# Patient Record
Sex: FEMALE | Race: OTHER
[De-identification: names, ages, dates, MRNs, and addresses within clinical notes are randomized per-mention and may not be internally consistent; named-entity substitution may affect disease eponyms.]

---

## 2018-07-18 ENCOUNTER — HOSPITAL ENCOUNTER (OUTPATIENT)
Dept: HOSPITAL 62 - SP | Age: 58
End: 2018-07-18
Attending: PHYSICIAN ASSISTANT
Payer: COMMERCIAL

## 2018-07-18 DIAGNOSIS — R60.0: Primary | ICD-10-CM

## 2018-07-18 PROCEDURE — 93970 EXTREMITY STUDY: CPT

## 2018-07-18 NOTE — RADIOLOGY REPORT (SQ)
EXAM DESCRIPTION:  VENOUS BILATERAL LOWER



COMPLETED DATE/TIME:  7/18/2018 3:01 pm



REASON FOR STUDY:  BLE LOCALIZED EDEMA R60.0  LOCALIZED EDEMA



COMPARISON:  None.



TECHNIQUE:  Dynamic and static gray scale and color images acquired of both lower extremity venous sy
stems. Selected spectral images acquired with additional compression and augmentation maneuvers. Imag
es stored on PACS.



LIMITATIONS:  None.



FINDINGS:  RIGHT LEG

COMMON FEMORAL AND FEMORAL: Normal phasicity, compression and augmentation. No visualized echogenic m
aterial on gray scale. No defects on color images.

POPLITEAL: Normal compression and augmentation. No visualized echogenic material on gray scale. No de
fects on color images.

CALF VESSELS: Normal compression and augmentation. No visualized echogenic material on gray scale. No
 defects on color image.

GSV AND SSV: Normal compression. No visualized echogenic material on gray scale. No defects on color 
images.

ANY DEEP VENOUS INSUFFICIENCY: Not evaluated.

ANY EVIDENCE OF POPLITEAL CYST: No.

OTHER: No other significant finding.

LEFT LEG

COMMON FEMORAL AND FEMORAL: Normal phasicity, compression and augmentation. No visualized echogenic m
aterial on gray scale. No defects on color images.

POPLITEAL: Normal compression and augmentation. No visualized echogenic material on gray scale. No de
fects on color images.

CALF VESSELS: Normal compression and augmentation. No visualized echogenic material on gray scale. No
 defects on color images.

GSV AND SSV: Normal compression. No visualized echogenic material on gray scale. No defects on color 
images.

ANY DEEP VENOUS INSUFFICIENCY: Not evaluated.

ANY EVIDENCE POPLITEAL CYST: No.

OTHER: No other significant finding.



IMPRESSION:  NO EVIDENCE DVT OR SVT IN EITHER LEG.



TECHNICAL DOCUMENTATION:  JOB ID:  [id number]

 2011 [employer]- All Rights Reserved



Reading location - IP/workstation name: [facility]-RR2

## 2018-08-24 ENCOUNTER — HOSPITAL ENCOUNTER (EMERGENCY)
Dept: HOSPITAL 62 - ER | Age: 58
LOS: 1 days | Discharge: HOME | End: 2018-08-25
Payer: COMMERCIAL

## 2018-08-24 DIAGNOSIS — F17.210: ICD-10-CM

## 2018-08-24 DIAGNOSIS — E87.1: ICD-10-CM

## 2018-08-24 DIAGNOSIS — R10.13: ICD-10-CM

## 2018-08-24 DIAGNOSIS — F10.10: ICD-10-CM

## 2018-08-24 DIAGNOSIS — R11.2: ICD-10-CM

## 2018-08-24 DIAGNOSIS — K85.20: Primary | ICD-10-CM

## 2018-08-24 LAB
ADD MANUAL DIFF: NO
ALBUMIN SERPL-MCNC: 4.1 G/DL (ref 3.5–5)
ALP SERPL-CCNC: 191 U/L (ref 38–126)
ALT SERPL-CCNC: 64 U/L (ref 9–52)
ANION GAP SERPL CALC-SCNC: 16 MMOL/L (ref 5–19)
APPEARANCE UR: CLEAR
APTT PPP: YELLOW S
AST SERPL-CCNC: 166 U/L (ref 14–36)
BASOPHILS # BLD AUTO: 0 10^3/UL (ref 0–0.2)
BASOPHILS NFR BLD AUTO: 0.3 % (ref 0–2)
BILIRUB DIRECT SERPL-MCNC: 0.5 MG/DL (ref 0–0.4)
BILIRUB SERPL-MCNC: 0.9 MG/DL (ref 0.2–1.3)
BILIRUB UR QL STRIP: NEGATIVE
BUN SERPL-MCNC: 11 MG/DL (ref 7–20)
CALCIUM: 8.8 MG/DL (ref 8.4–10.2)
CHLORIDE SERPL-SCNC: 79 MMOL/L (ref 98–107)
CO2 SERPL-SCNC: 27 MMOL/L (ref 22–30)
EOSINOPHIL # BLD AUTO: 0 10^3/UL (ref 0–0.6)
EOSINOPHIL NFR BLD AUTO: 0.2 % (ref 0–6)
ERYTHROCYTE [DISTWIDTH] IN BLOOD BY AUTOMATED COUNT: 16.1 % (ref 11.5–14)
GLUCOSE SERPL-MCNC: 102 MG/DL (ref 75–110)
GLUCOSE UR STRIP-MCNC: NEGATIVE MG/DL
HCT VFR BLD CALC: 41.5 % (ref 36–47)
HGB BLD-MCNC: 14.7 G/DL (ref 12–15.5)
KETONES UR STRIP-MCNC: (no result) MG/DL
LIPASE SERPL-CCNC: 2945.5 U/L (ref 23–300)
LYMPHOCYTES # BLD AUTO: 0.8 10^3/UL (ref 0.5–4.7)
LYMPHOCYTES NFR BLD AUTO: 9 % (ref 13–45)
MCH RBC QN AUTO: 31.8 PG (ref 27–33.4)
MCHC RBC AUTO-ENTMCNC: 35.4 G/DL (ref 32–36)
MCV RBC AUTO: 90 FL (ref 80–97)
MONOCYTES # BLD AUTO: 0.3 10^3/UL (ref 0.1–1.4)
MONOCYTES NFR BLD AUTO: 3.8 % (ref 3–13)
NEUTROPHILS # BLD AUTO: 7.3 10^3/UL (ref 1.7–8.2)
NEUTS SEG NFR BLD AUTO: 86.7 % (ref 42–78)
NITRITE UR QL STRIP: NEGATIVE
PH UR STRIP: 5 [PH] (ref 5–9)
PLATELET # BLD: 215 10^3/UL (ref 150–450)
POTASSIUM SERPL-SCNC: 3.5 MMOL/L (ref 3.6–5)
PROT SERPL-MCNC: 7.5 G/DL (ref 6.3–8.2)
PROT UR STRIP-MCNC: 30 MG/DL
RBC # BLD AUTO: 4.62 10^6/UL (ref 3.72–5.28)
SODIUM SERPL-SCNC: 121.5 MMOL/L (ref 137–145)
SP GR UR STRIP: 1.02
TOTAL CELLS COUNTED % (AUTO): 100 %
UROBILINOGEN UR-MCNC: NEGATIVE MG/DL (ref ?–2)
WBC # BLD AUTO: 8.4 10^3/UL (ref 4–10.5)

## 2018-08-24 PROCEDURE — 36415 COLL VENOUS BLD VENIPUNCTURE: CPT

## 2018-08-24 PROCEDURE — 96361 HYDRATE IV INFUSION ADD-ON: CPT

## 2018-08-24 PROCEDURE — 96375 TX/PRO/DX INJ NEW DRUG ADDON: CPT

## 2018-08-24 PROCEDURE — 83690 ASSAY OF LIPASE: CPT

## 2018-08-24 PROCEDURE — 80053 COMPREHEN METABOLIC PANEL: CPT

## 2018-08-24 PROCEDURE — 76705 ECHO EXAM OF ABDOMEN: CPT

## 2018-08-24 PROCEDURE — 85025 COMPLETE CBC W/AUTO DIFF WBC: CPT

## 2018-08-24 PROCEDURE — 93005 ELECTROCARDIOGRAM TRACING: CPT

## 2018-08-24 PROCEDURE — 81001 URINALYSIS AUTO W/SCOPE: CPT

## 2018-08-24 PROCEDURE — 99285 EMERGENCY DEPT VISIT HI MDM: CPT

## 2018-08-24 PROCEDURE — 87493 C DIFF AMPLIFIED PROBE: CPT

## 2018-08-24 PROCEDURE — 93010 ELECTROCARDIOGRAM REPORT: CPT

## 2018-08-24 PROCEDURE — 96374 THER/PROPH/DIAG INJ IV PUSH: CPT

## 2018-08-24 RX ADMIN — MORPHINE SULFATE PRN MG: 10 INJECTION INTRAMUSCULAR; INTRAVENOUS; SUBCUTANEOUS at 22:23

## 2018-08-24 NOTE — EKG REPORT
SEVERITY:- BORDERLINE ECG -

SINUS RHYTHM

BORDERLINE PROLONGED QT INTERVAL

:

Confirmed by: Nlel Palmer 24-Aug-2018 17:34:01

## 2018-08-24 NOTE — ER DOCUMENT REPORT
ED General





- General


Chief Complaint: Abdominal Pain


Stated Complaint: ABDOMINAL PAIN, VOMITING


Time Seen by Provider: 08/24/18 20:35


Notes: 





Patient is a 58-year-old female with a past medical history of hypertension, 

chronic alcohol use, restless leg syndrome, who presents with 4-5 hours of 

epigastric abdominal pain with associated nausea and vomiting.  She states that 

the pain came on relatively abruptly and has been ongoing since that time.  She 

describes it as a stabbing, moderate to severe pain to her epigastric region 

radiating through into her back.  Nothing seems to improve or worsen her 

symptoms.  She does admit to regular heavy alcohol use including today.  She 

denies any history of pancreatitis in the past.  She denies any history of 

alcohol withdrawal but does admit to drinking beer every day.  She has not seen 

her general doctor regarding today's concerns.  She denies any fever or 

constitutional symptoms.


TRAVEL OUTSIDE OF THE [place] IN LAST 30 DAYS: No





- Related Data


Allergies/Adverse Reactions: 


 





No Known Allergies Allergy (Unverified 08/24/18 18:58)


 











Past Medical History





- General


Information source: Patient





- Social History


Smoking Status: Current Every Day Smoker


Cigarette use (# per day): Yes


Frequency of alcohol use: Heavy


Drug Abuse: None


Lives with: Alone


Family History: Reviewed & Not Pertinent


Patient has suicidal ideation: No


Patient has homicidal ideation: No


Renal/ Medical History: Denies: Hx Peritoneal Dialysis


Musculoskeletal Medical History: Reports Hx Arthritis


Psychiatric Medical History: Reports: Hx Depression


Past Surgical History: Reports: Hx Breast Surgery, Hx Oral Surgery, Hx 

Orthopedic Surgery





Review of Systems





- Review of Systems


Notes: 





Constitutional: Negative for fever.


HENT: Negative for sore throat.


Eyes: Negative for visual changes.


Cardiovascular: Negative for chest pain.


Respiratory: Negative for shortness of breath.


Gastrointestinal: Positive for abdominal pain and vomiting


Genitourinary: Negative for dysuria.


Musculoskeletal: Positive for mid back pain


Skin: Negative for rash.


Neurological: Negative for headaches, weakness or numbness.





10 point ROS negative except as marked above and in HPI.





Physical Exam





- Vital signs


Vitals: 


 











Temp Pulse Resp BP Pulse Ox


 


 97.5 F   64   20   128/70 H  96 


 


 08/24/18 19:11  08/24/18 19:11  08/24/18 19:11  08/24/18 19:11  08/24/18 19:11











Interpretation: Normal


Notes: 





PHYSICAL EXAMINATION:





GENERAL: Appears moderately uncomfortable but in no acute distress





HEAD: Atraumatic, normocephalic.





EYES: Pupils equal round and reactive to light, extraocular movements intact, 

sclera anicteric, conjunctiva are normal.





ENT: nares patent, oropharynx clear without exudates.  Moderately dry mucous 

membranes.





NECK: Normal range of motion, supple without lymphadenopathy





LUNGS: Breath sounds clear to auscultation bilaterally and equal.  No wheezes 

rales or rhonchi.





HEART: Regular rate and rhythm without murmurs





ABDOMEN: Soft, focal tenderness to the epigastrium but no other localized areas 

of tenderness, normoactive bowel sounds.  No guarding, no rebound.  No masses 

appreciated.





EXTREMITIES: Normal range of motion, no pitting or edema.  No cyanosis.





NEUROLOGICAL: No focal neurological deficits. Moves all extremities 

spontaneously and on command.





PSYCH: Normal mood, normal affect.





SKIN: Warm, Dry, normal turgor, no rashes or lesions noted.





Course





- Re-evaluation


Re-evalutation: 





08/24/18 21:36


Patient presents with clinical history and exam and labs to suggest acute 

pancreatitis.  Lipase is markedly elevated today.  Patient admits to alcohol 

use as the trigger for the acute episode of pancreatitis.  Patient does have 

hyponatremia although we have no old for comparison but given both hyponatremia 

and hypochloremia consistent with heavy alcohol intake which patient does admit 

to.  At time of arrival, patient's vitals are within normal limits, they are 

well-appearing and in no acute distress.  The patient has tolerated oral intake 

without difficulty.  Pain was able to be controlled here in the emergency 

department with oral medications.  [place] score is 1.  





I have encouraged hospitalization for the patient given her hyponatremia and 

vomiting but she has declined stating she would like to go home and manage this 

as an outpatient.  The patient states that she needs to go home, take care of 

her animals, and states that she will return if she is not improving.  She has 

capacity, verbalizes understanding that she could get sicker as an outpatient 

which could include multiple complications including seizures, death.  I have 

emphasized with the patient that I am concerned about the low probability of 

success in outpatient management particularly given her frequent alcohol use.  

I have emphasized that I am also concerned about what would happen if she 

stopped drinking alcohol.  The patient states that she has gone more than 24 

hours repeatedly within the past 6 months without having any withdrawal 

symptoms and that she has never had significant alcohol withdrawal.  She states 

that she is very confident that she can discontinue alcohol without any 

significant complications and does not wish to be hospitalized for management 

of any potential alcohol withdrawal symptoms.





At this time, per patient request, will discharge with return precautions and 

follow-up recommendations.  Verbal discharge instructions given a the bedside 

and opportunity for questions given. Medication warnings reviewed. Patient is 

in agreement with this plan and has verbalized understanding of return 

precautions and the need for primary care follow-up in the next 24 hours.








- Vital Signs


Vital signs: 


 











Temp Pulse Resp BP Pulse Ox


 


 97.6 F   64   18   184/85 H  93 


 


 08/25/18 02:09  08/25/18 02:09  08/25/18 02:09  08/25/18 02:09  08/25/18 02:09














- Laboratory


Result Diagrams: 


 08/24/18 21:43





 08/24/18 21:43


Laboratory results interpreted by me: 


 











  08/24/18 08/24/18 08/24/18





  21:43 21:43 21:43


 


RDW  16.1 H  


 


Seg Neutrophils %  86.7 H  


 


Lymphocytes %  9.0 L  


 


Sodium   121.5 L 


 


Potassium   3.5 L 


 


Chloride   79 L 


 


Direct Bilirubin   0.5 H 


 


AST   166 H 


 


ALT   64 H 


 


Alkaline Phosphatase   191 H 


 


Lipase   2945.5 H 


 


Urine Protein    30 H


 


Urine Ketones    TRACE H


 


Urine Blood    SMALL H














- Diagnostic Test


Radiology reviewed: Reports reviewed





Discharge





- Discharge


Clinical Impression: 


 Hyponatremia, Alcohol abuse





Acute alcoholic pancreatitis


Qualifiers:


 Acute pancreatitis complication: no infection or necrosis Qualified Code(s): 

K85.20 - Alcohol induced acute pancreatitis without necrosis or infection





Nausea and vomiting


Qualifiers:


 Vomiting type: unspecified Vomiting Intractability: non-intractable Qualified 

Code(s): R11.2 - Nausea with vomiting, unspecified





Condition: Stable


Disposition: HOME, SELF-CARE


Additional Instructions: 


You were seen today for alcohol-induced pancreatitis.  Please avoid alcohol in 

any quantity in the future as this could cause a recurrence of your 

pancreatitis.  Please keep in mind that pancreatitis can be a very serious 

condition that can even result in death.  Your case today appears mild and it 

is safe for you to go home today with medications for pain and nausea.  Please 

drink plenty of fluids such as Pedialyte, (try to avoid excessive plain water 

consumption because your salt level was low) over the next several days and try 

to avoid food ingestion until your pain is resolved.  Please return to the 

emergency department immediately if you develop persistent vomiting that 

prohibits you from taking your medications or keeping fluids down, you have a 

seizure, you develop a fever of greater than 101F, you have worsening pain, 

you become confused, you become short of breath, or have any other symptoms 

that are worrisome to you.  Please follow-up with your primary care doctor in 

the next 24-48 hours.


Prescriptions: 


Morphine Sulfate [Morphine Ir 15 mg Tablet] 15 mg PO Q6HP PRN #12 tablet


 PRN Reason: 


Ondansetron [Zofran Odt 4 mg Tablet] 1 - 2 tab PO Q4H PRN #15 tab.rapdis


 PRN Reason: For Nausea/Vomiting


Referrals: 


NICOLE ZULETA PA [Primary Care Provider] - Follow up tomorrow
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
## 2018-08-25 VITALS — DIASTOLIC BLOOD PRESSURE: 85 MMHG | SYSTOLIC BLOOD PRESSURE: 184 MMHG

## 2018-08-25 RX ADMIN — MORPHINE SULFATE PRN MG: 10 INJECTION INTRAMUSCULAR; INTRAVENOUS; SUBCUTANEOUS at 00:27

## 2018-08-25 NOTE — RADIOLOGY REPORT (SQ)
EXAM DESCRIPTION: 



US ABDOMEN LIMITED



COMPLETED DATE/TME:  08/24/2018 21:37



CLINICAL HISTORY: upper abdominal pain, vomiting



COMPARISON: None.



TECHNIQUE: Real-time sonographic images of the right upper

abdomen were obtained using a curved multihertz transducer.



FINDINGS:



Pancreas: Not visualized due to overlying structures.



Vascular: The visualized portions of the aorta and IVC are

unremarkable.



Liver: The liver has normal contour and increased echogenicity.

Hepatopedal flow in the portal vein.  Findings confirmed with

color and spectral Doppler imaging. The common bile duct is not

visualized.



Gallbladder: The gallbladder has a normal appearance. No

gallstones identified. No wall thickening or pericholecystic

fluid. Positive reported sonographic Parker sign.



Right Kidney: The right kidney measures 10.2 cm in length. No

hydronephrosis, solid renal mass, or shadowing calculi.







IMPRESSION:



1. No gallstones identified.



2. Hepatic steatosis.

## 2018-11-01 ENCOUNTER — HOSPITAL ENCOUNTER (EMERGENCY)
Dept: HOSPITAL 62 - ER | Age: 58
LOS: 1 days | Discharge: HOME | End: 2018-11-02
Payer: COMMERCIAL

## 2018-11-01 DIAGNOSIS — F10.10: Primary | ICD-10-CM

## 2018-11-01 DIAGNOSIS — Z88.2: ICD-10-CM

## 2018-11-01 DIAGNOSIS — Z86.14: ICD-10-CM

## 2018-11-01 DIAGNOSIS — T43.591A: ICD-10-CM
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
## 2018-11-01 DIAGNOSIS — F17.200: ICD-10-CM

## 2018-11-01 LAB
ABSOLUTE LYMPHOCYTES# (MANUAL): 0.6 10^3/UL (ref 0.5–4.7)
ABSOLUTE MONOCYTES # (MANUAL): 0.1 10^3/UL (ref 0.1–1.4)
ABSOLUTE NEUTROPHILS# (MANUAL): 1.7 10^3/UL (ref 1.7–8.2)
ADD MANUAL DIFF: YES
ALBUMIN SERPL-MCNC: 4 G/DL (ref 3.5–5)
ALP SERPL-CCNC: 243 U/L (ref 38–126)
ALT SERPL-CCNC: 132 U/L (ref 9–52)
ANION GAP SERPL CALC-SCNC: 18 MMOL/L (ref 5–19)
ANISOCYTOSIS BLD QL SMEAR: SLIGHT
APAP SERPL-MCNC: < 10 UG/ML (ref 10–30)
APPEARANCE UR: CLEAR
APTT PPP: (no result) S
AST SERPL-CCNC: 553 U/L (ref 14–36)
BARBITURATES UR QL SCN: NEGATIVE
BASOPHILS NFR BLD MANUAL: 0 % (ref 0–2)
BILIRUB DIRECT SERPL-MCNC: 0.4 MG/DL (ref 0–0.4)
BILIRUB SERPL-MCNC: 0.7 MG/DL (ref 0.2–1.3)
BILIRUB UR QL STRIP: NEGATIVE
BUN SERPL-MCNC: 3 MG/DL (ref 7–20)
CALCIUM: 8.7 MG/DL (ref 8.4–10.2)
CHLORIDE SERPL-SCNC: 93 MMOL/L (ref 98–107)
CO2 SERPL-SCNC: 24 MMOL/L (ref 22–30)
EOSINOPHIL NFR BLD MANUAL: 0 % (ref 0–6)
ERYTHROCYTE [DISTWIDTH] IN BLOOD BY AUTOMATED COUNT: 14.5 % (ref 11.5–14)
ETHANOL SERPL-MCNC: 213 MG/DL
GLUCOSE SERPL-MCNC: 100 MG/DL (ref 75–110)
GLUCOSE UR STRIP-MCNC: NEGATIVE MG/DL
HCT VFR BLD CALC: 37.6 % (ref 36–47)
HGB BLD-MCNC: 12.9 G/DL (ref 12–15.5)
KETONES UR STRIP-MCNC: NEGATIVE MG/DL
LIPASE SERPL-CCNC: 786.5 U/L (ref 23–300)
MCH RBC QN AUTO: 31.8 PG (ref 27–33.4)
MCHC RBC AUTO-ENTMCNC: 34.3 G/DL (ref 32–36)
MCV RBC AUTO: 93 FL (ref 80–97)
METHADONE UR QL SCN: NEGATIVE
MONOCYTES % (MANUAL): 4 % (ref 3–13)
NITRITE UR QL STRIP: NEGATIVE
PCP UR QL SCN: NEGATIVE
PH UR STRIP: 6 [PH] (ref 5–9)
PLATELET # BLD: 114 10^3/UL (ref 150–450)
PLATELET COMMENT: ADEQUATE
POIKILOCYTOSIS BLD QL SMEAR: SLIGHT
POTASSIUM SERPL-SCNC: 4 MMOL/L (ref 3.6–5)
PROT SERPL-MCNC: 7 G/DL (ref 6.3–8.2)
PROT UR STRIP-MCNC: NEGATIVE MG/DL
RBC # BLD AUTO: 4.05 10^6/UL (ref 3.72–5.28)
SALICYLATES SERPL-MCNC: < 1 MG/DL (ref 2–20)
SEGMENTED NEUTROPHILS % (MAN): 70 % (ref 42–78)
SODIUM SERPL-SCNC: 134.7 MMOL/L (ref 137–145)
SP GR UR STRIP: 1
TARGETS BLD QL SMEAR: SLIGHT
TOTAL CELLS COUNTED BLD: 100
URINE AMPHETAMINES SCREEN: NEGATIVE
URINE BENZODIAZEPINES SCREEN: (no result)
URINE COCAINE SCREEN: NEGATIVE
URINE MARIJUANA (THC) SCREEN: NEGATIVE
UROBILINOGEN UR-MCNC: NEGATIVE MG/DL (ref ?–2)
VARIANT LYMPHS NFR BLD MANUAL: 26 % (ref 13–45)
WBC # BLD AUTO: 2.4 10^3/UL (ref 4–10.5)

## 2018-11-01 PROCEDURE — 81001 URINALYSIS AUTO W/SCOPE: CPT

## 2018-11-01 PROCEDURE — 99285 EMERGENCY DEPT VISIT HI MDM: CPT

## 2018-11-01 PROCEDURE — 80053 COMPREHEN METABOLIC PANEL: CPT

## 2018-11-01 PROCEDURE — 80048 BASIC METABOLIC PNL TOTAL CA: CPT

## 2018-11-01 PROCEDURE — 83690 ASSAY OF LIPASE: CPT

## 2018-11-01 PROCEDURE — 93005 ELECTROCARDIOGRAM TRACING: CPT

## 2018-11-01 PROCEDURE — 96376 TX/PRO/DX INJ SAME DRUG ADON: CPT

## 2018-11-01 PROCEDURE — 80307 DRUG TEST PRSMV CHEM ANLYZR: CPT

## 2018-11-01 PROCEDURE — 36415 COLL VENOUS BLD VENIPUNCTURE: CPT

## 2018-11-01 PROCEDURE — 96375 TX/PRO/DX INJ NEW DRUG ADDON: CPT

## 2018-11-01 PROCEDURE — 96365 THER/PROPH/DIAG IV INF INIT: CPT

## 2018-11-01 PROCEDURE — 85025 COMPLETE CBC W/AUTO DIFF WBC: CPT

## 2018-11-01 PROCEDURE — 96361 HYDRATE IV INFUSION ADD-ON: CPT

## 2018-11-01 PROCEDURE — 84460 ALANINE AMINO (ALT) (SGPT): CPT

## 2018-11-01 PROCEDURE — 72070 X-RAY EXAM THORAC SPINE 2VWS: CPT

## 2018-11-01 PROCEDURE — 84450 TRANSFERASE (AST) (SGOT): CPT

## 2018-11-01 PROCEDURE — 93010 ELECTROCARDIOGRAM REPORT: CPT
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
## 2018-11-01 NOTE — RADIOLOGY REPORT (SQ)
EXAM DESCRIPTION: 



XR THORACIC SPINE 2 VIEWS



COMPLETED DATE/TME:  11/01/2018 23:14



CLINICAL HISTORY: 



58 years, Female, pain



COMPARISON:

None.



NUMBER OF VIEWS:

2



TECHNIQUE:

Frontal and lateral views of the thoracic spine



LIMITATIONS:

None.



FINDINGS:



Vertebral body height and alignment is preserved. The disc spaces

are maintained.



IMPRESSION:



Unremarkable exam

 



 2011 [facility] [employer]- All Rights Reserved

## 2018-11-02 VITALS — DIASTOLIC BLOOD PRESSURE: 85 MMHG | SYSTOLIC BLOOD PRESSURE: 177 MMHG

## 2018-11-02 LAB
ALT SERPL-CCNC: 116 U/L (ref 9–52)
ANION GAP SERPL CALC-SCNC: 14 MMOL/L (ref 5–19)
AST SERPL-CCNC: 542 U/L (ref 14–36)
BUN SERPL-MCNC: 3 MG/DL (ref 7–20)
CALCIUM: 8.7 MG/DL (ref 8.4–10.2)
CHLORIDE SERPL-SCNC: 94 MMOL/L (ref 98–107)
CO2 SERPL-SCNC: 28 MMOL/L (ref 22–30)
GLUCOSE SERPL-MCNC: 99 MG/DL (ref 75–110)
POTASSIUM SERPL-SCNC: 3.7 MMOL/L (ref 3.6–5)
SODIUM SERPL-SCNC: 136.3 MMOL/L (ref 137–145)

## 2018-11-02 NOTE — EKG REPORT
SEVERITY:- OTHERWISE NORMAL ECG -

SINUS TACHYCARDIA

:

Confirmed by: Kailee Ricardo MD 02-Nov-2018 12:39:04

## 2018-11-02 NOTE — EKG REPORT
SEVERITY:- BORDERLINE ECG -

SINUS RHYTHM

BORDERLINE PROLONGED QT INTERVAL

:

Confirmed by: Kailee Ricardo MD 02-Nov-2018 12:39:07
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
## 2018-11-03 ENCOUNTER — HOSPITAL ENCOUNTER (EMERGENCY)
Dept: HOSPITAL 62 - ER | Age: 58
Discharge: ANOTHER HOSPITAL | End: 2018-11-03
Payer: COMMERCIAL

## 2018-11-03 VITALS — SYSTOLIC BLOOD PRESSURE: 117 MMHG | DIASTOLIC BLOOD PRESSURE: 70 MMHG
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
## 2018-11-03 DIAGNOSIS — Z88.2: ICD-10-CM

## 2018-11-03 DIAGNOSIS — F17.200: ICD-10-CM
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
## 2018-11-03 DIAGNOSIS — R56.9: ICD-10-CM

## 2018-11-03 DIAGNOSIS — E87.1: ICD-10-CM

## 2018-11-03 DIAGNOSIS — F10.129: ICD-10-CM

## 2018-11-03 DIAGNOSIS — S01.01XA: Primary | ICD-10-CM

## 2018-11-03 LAB
ADD MANUAL DIFF: NO
ALBUMIN SERPL-MCNC: 4.2 G/DL (ref 3.5–5)
ALP SERPL-CCNC: 254 U/L (ref 38–126)
ALT SERPL-CCNC: 111 U/L (ref 9–52)
ANION GAP SERPL CALC-SCNC: 20 MMOL/L (ref 5–19)
AST SERPL-CCNC: 408 U/L (ref 14–36)
BASOPHILS # BLD AUTO: 0 10^3/UL (ref 0–0.2)
BASOPHILS NFR BLD AUTO: 0.2 % (ref 0–2)
BILIRUB DIRECT SERPL-MCNC: 0.9 MG/DL (ref 0–0.4)
BILIRUB SERPL-MCNC: 1.8 MG/DL (ref 0.2–1.3)
BUN SERPL-MCNC: 4 MG/DL (ref 7–20)
CALCIUM: 8.9 MG/DL (ref 8.4–10.2)
CHLORIDE SERPL-SCNC: 79 MMOL/L (ref 98–107)
CO2 SERPL-SCNC: 26 MMOL/L (ref 22–30)
EOSINOPHIL # BLD AUTO: 0 10^3/UL (ref 0–0.6)
EOSINOPHIL NFR BLD AUTO: 0.3 % (ref 0–6)
ERYTHROCYTE [DISTWIDTH] IN BLOOD BY AUTOMATED COUNT: 14.3 % (ref 11.5–14)
ETHANOL SERPL-MCNC: 159 MG/DL
GLUCOSE SERPL-MCNC: 105 MG/DL (ref 75–110)
HCT VFR BLD CALC: 42.7 % (ref 36–47)
HGB BLD-MCNC: 14.8 G/DL (ref 12–15.5)
LYMPHOCYTES # BLD AUTO: 0.6 10^3/UL (ref 0.5–4.7)
LYMPHOCYTES NFR BLD AUTO: 8 % (ref 13–45)
MCH RBC QN AUTO: 32.4 PG (ref 27–33.4)
MCHC RBC AUTO-ENTMCNC: 34.7 G/DL (ref 32–36)
MCV RBC AUTO: 93 FL (ref 80–97)
MONOCYTES # BLD AUTO: 0.4 10^3/UL (ref 0.1–1.4)
MONOCYTES NFR BLD AUTO: 4.7 % (ref 3–13)
NEUTROPHILS # BLD AUTO: 6.6 10^3/UL (ref 1.7–8.2)
NEUTS SEG NFR BLD AUTO: 86.8 % (ref 42–78)
PLATELET # BLD: 92 10^3/UL (ref 150–450)
POTASSIUM SERPL-SCNC: 3.8 MMOL/L (ref 3.6–5)
PROT SERPL-MCNC: 7.4 G/DL (ref 6.3–8.2)
RBC # BLD AUTO: 4.58 10^6/UL (ref 3.72–5.28)
SODIUM SERPL-SCNC: 125.7 MMOL/L (ref 137–145)
TOTAL CELLS COUNTED % (AUTO): 100 %
WBC # BLD AUTO: 7.6 10^3/UL (ref 4–10.5)

## 2018-11-03 PROCEDURE — 85025 COMPLETE CBC W/AUTO DIFF WBC: CPT

## 2018-11-03 PROCEDURE — 99291 CRITICAL CARE FIRST HOUR: CPT

## 2018-11-03 PROCEDURE — 36415 COLL VENOUS BLD VENIPUNCTURE: CPT

## 2018-11-03 PROCEDURE — 12002 RPR S/N/AX/GEN/TRNK2.6-7.5CM: CPT

## 2018-11-03 PROCEDURE — 80053 COMPREHEN METABOLIC PANEL: CPT

## 2018-11-03 PROCEDURE — 72125 CT NECK SPINE W/O DYE: CPT

## 2018-11-03 PROCEDURE — 96361 HYDRATE IV INFUSION ADD-ON: CPT

## 2018-11-03 PROCEDURE — 80307 DRUG TEST PRSMV CHEM ANLYZR: CPT

## 2018-11-03 PROCEDURE — 71045 X-RAY EXAM CHEST 1 VIEW: CPT

## 2018-11-03 PROCEDURE — 70450 CT HEAD/BRAIN W/O DYE: CPT

## 2018-11-03 PROCEDURE — 96375 TX/PRO/DX INJ NEW DRUG ADDON: CPT

## 2018-11-03 PROCEDURE — 96365 THER/PROPH/DIAG IV INF INIT: CPT

## 2018-11-03 RX ADMIN — SODIUM CHLORIDE PRN MLS/HR: 9 INJECTION, SOLUTION INTRAVENOUS at 18:00

## 2018-11-03 RX ADMIN — SODIUM CHLORIDE PRN MLS/HR: 9 INJECTION, SOLUTION INTRAVENOUS at 18:01

## 2018-11-03 NOTE — RADIOLOGY REPORT (SQ)
EXAM DESCRIPTION:  CT HEAD WITHOUT



COMPLETED DATE/TIME:  11/3/2018 4:18 pm



REASON FOR STUDY:  Fell and hit back of head, LOC, seizure



COMPARISON:  None.



TECHNIQUE:  Axial images acquired through the brain without intravenous contrast.  Images reviewed wi
th bone, brain and subdural windows.   Images stored on PACS.

All CT scanners at this facility use dose modulation, iterative reconstruction, and/or weight based d
osing when appropriate to reduce radiation dose to as low as reasonably achievable (ALARA).

[facility]: Dose Right  [facility]: CareDose    [facility]: Dose Right    [facility]: Teradose 4D    [facility]: Smart Technologies



RADIATION DOSE:  CT Rad equipment meets quality standard of care and radiation dose reduction techniq
ues were employed. CTDIvol: 53.2 mGy. DLP: 1070 mGy-cm. mGy.



LIMITATIONS:  None.



FINDINGS:  VENTRICLES: Normal size and contour.

CEREBRUM: No masses.  No hemorrhage.  No midline shift.  No evidence for acute infarction. Normal gra
y/white matter differentiation. No areas of low density in the white matter.

CEREBELLUM: No masses.  No hemorrhage.  No alteration of density.  No evidence for acute infarction.

EXTRAAXIAL SPACES: No fluid collections.  No masses.

ORBITS AND GLOBE: No intra- or extraconal masses.  Normal contour of globe without masses.

CALVARIUM: No fracture.

PARANASAL SINUSES: No fluid or mucosal thickening.

SOFT TISSUES: Posterior soft tissue swelling.

OTHER: No other significant finding.



IMPRESSION:  No intracranial hemorrhage or fracture.Posterior soft tissue swelling.

EVIDENCE OF ACUTE STROKE: NO.



COMMENT:  Quality ID # 436: Final reports with documentation of one or more dose reduction techniques
 (e.g., Automated exposure control, adjustment of the mA and/or kV according to patient size, use of 
iterative reconstruction technique)



TECHNICAL DOCUMENTATION:  JOB ID:  [id number]

[id number]

 2011 [employer]- All Rights Reserved



Reading location - IP/workstation name: [email or web address]

## 2018-11-03 NOTE — RADIOLOGY REPORT (SQ)
EXAM DESCRIPTION:  CT CERVICAL SPINE WITHOUT



COMPLETED DATE/TIME:  11/3/2018 4:18 pm



REASON FOR STUDY:  Fell and hit back of head, LOC, seizure



COMPARISON:  None.



TECHNIQUE:  Axial images acquired through the cervical spine without intravenous contrast.  Images re
viewed with lung, soft tissue and bone windows.  Reconstructed coronal and sagittal MPR images review
ed.  Images stored on PACS.

All CT scanners at this facility use dose modulation, iterative reconstruction, and/or weight based d
osing when appropriate to reduce radiation dose to as low as reasonably achievable (ALARA).

[facility]: Dose Right  [facility]: CareDose    [facility]: Dose Right    [facility]: Teradose 4D    [facility]: Smart Technologies



RADIATION DOSE:  CT Rad equipment meets quality standard of care and radiation dose reduction techniq
ues were employed. CTDIvol: 21.3 mGy. DLP: 529 mGy-cm. mGy.



LIMITATIONS:  None.



FINDINGS:  ALIGNMENT: Anatomic.

MINERALIZATION: Normal.

VERTEBRAL BODIES: No fractures or dislocation.

DISCS: No significant disc disease.

FACETS, LATERAL MASSES, POSTERIOR ELEMENTS: No fractures.  No dislocation.  No acute findings.

HARDWARE: None in the spine.

VISUALIZED RIBS: No fractures.

LUNG APICES AND SOFT TISSUES: Left thyroid inferior nodule with calcifications.

OTHER:  Fluid in the right middle ear, correlate for otitis.



IMPRESSION:  No evidence for acute osseous injury.  Fluid in the right middle ear, correlate for otit
is.Left thyroid inferior nodule with calcifications.



TECHNICAL DOCUMENTATION:  JOB ID:  [id number]

[id number]

Quality ID # 436: Final reports with documentation of one or more dose reduction techniques (e.g., Au
tomated exposure control, adjustment of the mA and/or kV according to patient size, use of iterative 
reconstruction technique)

 2011 [employer]- All Rights Reserved



Reading location - IP/workstation name: [email or web address]
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
## 2018-11-03 NOTE — RADIOLOGY REPORT (SQ)
EXAM DESCRIPTION:  CHEST SINGLE VIEW



COMPLETED DATE/TIME:  11/3/2018 6:47 pm



REASON FOR STUDY:  Seizure, rule out aspiration



COMPARISON:  None.



EXAM PARAMETERS:  NUMBER OF VIEWS: One view.

TECHNIQUE: Single frontal radiographic view of the chest acquired.

RADIATION DOSE: NA

LIMITATIONS: None.



FINDINGS:  LUNGS AND PLEURA: No opacities, masses or pneumothorax. No pleural effusion.

MEDIASTINUM AND HILAR STRUCTURES: No masses.  Contour normal.

HEART AND VASCULAR STRUCTURES: Heart normal in size.  Normal vasculature.

BONES: No acute findings.

HARDWARE: None in the chest.

OTHER: No other significant finding.



IMPRESSION:  NO ACUTE RADIOGRAPHIC FINDING IN THE CHEST.



TECHNICAL DOCUMENTATION:  JOB ID:  [id number]

[id number]

 2011 [employer]- All Rights Reserved



Reading location - IP/workstation name: [email or web address]

## 2019-03-08 ENCOUNTER — HOSPITAL ENCOUNTER (INPATIENT)
Dept: HOSPITAL 62 - ER | Age: 59
LOS: 31 days | Discharge: HOME | DRG: 896 | End: 2019-04-08
Attending: INTERNAL MEDICINE | Admitting: INTERNAL MEDICINE
Payer: COMMERCIAL

## 2019-03-08 DIAGNOSIS — E51.2: ICD-10-CM

## 2019-03-08 DIAGNOSIS — F17.210: ICD-10-CM

## 2019-03-08 DIAGNOSIS — A04.72: ICD-10-CM
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
## 2019-03-08 DIAGNOSIS — F10.239: ICD-10-CM

## 2019-03-08 DIAGNOSIS — E87.1: ICD-10-CM

## 2019-03-08 DIAGNOSIS — Z78.1: ICD-10-CM

## 2019-03-08 DIAGNOSIS — K85.90: ICD-10-CM
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
## 2019-03-08 DIAGNOSIS — N17.9: ICD-10-CM

## 2019-03-08 DIAGNOSIS — K70.10: ICD-10-CM

## 2019-03-08 DIAGNOSIS — S61.511A: ICD-10-CM

## 2019-03-08 DIAGNOSIS — F10.229: Primary | ICD-10-CM

## 2019-03-08 DIAGNOSIS — F32.9: ICD-10-CM

## 2019-03-08 DIAGNOSIS — F10.251: ICD-10-CM
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
## 2019-03-08 LAB
ADD MANUAL DIFF: NO
ALBUMIN SERPL-MCNC: 5 G/DL (ref 3.5–5)
ALP SERPL-CCNC: 280 U/L (ref 38–126)
ALT SERPL-CCNC: 144 U/L (ref 9–52)
ANION GAP SERPL CALC-SCNC: 21 MMOL/L (ref 5–19)
APAP SERPL-MCNC: < 10 UG/ML (ref 10–30)
APPEARANCE UR: (no result)
APTT PPP: YELLOW S
AST SERPL-CCNC: 506 U/L (ref 14–36)
BARBITURATES UR QL SCN: NEGATIVE
BASOPHILS # BLD AUTO: 0 10^3/UL (ref 0–0.2)
BASOPHILS NFR BLD AUTO: 0.8 % (ref 0–2)
BILIRUB DIRECT SERPL-MCNC: 0.8 MG/DL (ref 0–0.4)
BILIRUB SERPL-MCNC: 1.3 MG/DL (ref 0.2–1.3)
BILIRUB UR QL STRIP: NEGATIVE
BUN SERPL-MCNC: 7 MG/DL (ref 7–20)
CALCIUM: 9 MG/DL (ref 8.4–10.2)
CHLORIDE SERPL-SCNC: 78 MMOL/L (ref 98–107)
CO2 SERPL-SCNC: 26 MMOL/L (ref 22–30)
EOSINOPHIL # BLD AUTO: 0 10^3/UL (ref 0–0.6)
EOSINOPHIL NFR BLD AUTO: 0.2 % (ref 0–6)
ERYTHROCYTE [DISTWIDTH] IN BLOOD BY AUTOMATED COUNT: 15.8 % (ref 11.5–14)
ETHANOL SERPL-MCNC: 426 MG/DL
GLUCOSE SERPL-MCNC: 92 MG/DL (ref 75–110)
GLUCOSE UR STRIP-MCNC: NEGATIVE MG/DL
HCT VFR BLD CALC: 37.9 % (ref 36–47)
HGB BLD-MCNC: 13.2 G/DL (ref 12–15.5)
KETONES UR STRIP-MCNC: 20 MG/DL
LYMPHOCYTES # BLD AUTO: 0.9 10^3/UL (ref 0.5–4.7)
LYMPHOCYTES NFR BLD AUTO: 17.6 % (ref 13–45)
MCH RBC QN AUTO: 30.5 PG (ref 27–33.4)
MCHC RBC AUTO-ENTMCNC: 35 G/DL (ref 32–36)
MCV RBC AUTO: 87 FL (ref 80–97)
METHADONE UR QL SCN: NEGATIVE
MONOCYTES # BLD AUTO: 0.3 10^3/UL (ref 0.1–1.4)
MONOCYTES NFR BLD AUTO: 6.7 % (ref 3–13)
NEUTROPHILS # BLD AUTO: 3.8 10^3/UL (ref 1.7–8.2)
NEUTS SEG NFR BLD AUTO: 74.7 % (ref 42–78)
NITRITE UR QL STRIP: NEGATIVE
PCP UR QL SCN: NEGATIVE
PH UR STRIP: 6 [PH] (ref 5–9)
PLATELET # BLD: 130 10^3/UL (ref 150–450)
POTASSIUM SERPL-SCNC: 4 MMOL/L (ref 3.6–5)
PROT SERPL-MCNC: 8.1 G/DL (ref 6.3–8.2)
PROT UR STRIP-MCNC: 30 MG/DL
RBC # BLD AUTO: 4.33 10^6/UL (ref 3.72–5.28)
SALICYLATES SERPL-MCNC: < 1 MG/DL (ref 2–20)
SODIUM SERPL-SCNC: 124.5 MMOL/L (ref 137–145)
SP GR UR STRIP: 1.02
TOTAL CELLS COUNTED % (AUTO): 100 %
URINE AMPHETAMINES SCREEN: NEGATIVE
URINE BENZODIAZEPINES SCREEN: NEGATIVE
URINE COCAINE SCREEN: NEGATIVE
URINE MARIJUANA (THC) SCREEN: NEGATIVE
UROBILINOGEN UR-MCNC: NEGATIVE MG/DL (ref ?–2)
WBC # BLD AUTO: 5 10^3/UL (ref 4–10.5)

## 2019-03-08 PROCEDURE — 93005 ELECTROCARDIOGRAM TRACING: CPT

## 2019-03-08 PROCEDURE — 82272 OCCULT BLD FECES 1-3 TESTS: CPT

## 2019-03-08 PROCEDURE — 83690 ASSAY OF LIPASE: CPT

## 2019-03-08 PROCEDURE — 80074 ACUTE HEPATITIS PANEL: CPT

## 2019-03-08 PROCEDURE — 87493 C DIFF AMPLIFIED PROBE: CPT

## 2019-03-08 PROCEDURE — 85025 COMPLETE CBC W/AUTO DIFF WBC: CPT

## 2019-03-08 PROCEDURE — 85027 COMPLETE CBC AUTOMATED: CPT

## 2019-03-08 PROCEDURE — 80307 DRUG TEST PRSMV CHEM ANLYZR: CPT

## 2019-03-08 PROCEDURE — 84484 ASSAY OF TROPONIN QUANT: CPT

## 2019-03-08 PROCEDURE — 82140 ASSAY OF AMMONIA: CPT

## 2019-03-08 PROCEDURE — 80061 LIPID PANEL: CPT

## 2019-03-08 PROCEDURE — 82962 GLUCOSE BLOOD TEST: CPT

## 2019-03-08 PROCEDURE — 96374 THER/PROPH/DIAG INJ IV PUSH: CPT

## 2019-03-08 PROCEDURE — 72125 CT NECK SPINE W/O DYE: CPT

## 2019-03-08 PROCEDURE — 80076 HEPATIC FUNCTION PANEL: CPT

## 2019-03-08 PROCEDURE — 81001 URINALYSIS AUTO W/SCOPE: CPT

## 2019-03-08 PROCEDURE — 36415 COLL VENOUS BLD VENIPUNCTURE: CPT

## 2019-03-08 PROCEDURE — 96375 TX/PRO/DX INJ NEW DRUG ADDON: CPT

## 2019-03-08 PROCEDURE — 87045 FECES CULTURE AEROBIC BACT: CPT

## 2019-03-08 PROCEDURE — 83735 ASSAY OF MAGNESIUM: CPT

## 2019-03-08 PROCEDURE — 87205 SMEAR GRAM STAIN: CPT

## 2019-03-08 PROCEDURE — 74177 CT ABD & PELVIS W/CONTRAST: CPT

## 2019-03-08 PROCEDURE — 80048 BASIC METABOLIC PNL TOTAL CA: CPT

## 2019-03-08 PROCEDURE — 71260 CT THORAX DX C+: CPT

## 2019-03-08 PROCEDURE — 87040 BLOOD CULTURE FOR BACTERIA: CPT

## 2019-03-08 PROCEDURE — 80053 COMPREHEN METABOLIC PANEL: CPT

## 2019-03-08 PROCEDURE — 70450 CT HEAD/BRAIN W/O DYE: CPT

## 2019-03-08 PROCEDURE — 96361 HYDRATE IV INFUSION ADD-ON: CPT

## 2019-03-08 PROCEDURE — 99285 EMERGENCY DEPT VISIT HI MDM: CPT

## 2019-03-08 PROCEDURE — 93010 ELECTROCARDIOGRAM REPORT: CPT

## 2019-03-08 RX ADMIN — Medication SCH ML: at 21:54

## 2019-03-08 RX ADMIN — LORAZEPAM PRN MG: 2 INJECTION INTRAMUSCULAR; INTRAVENOUS at 17:35

## 2019-03-08 RX ADMIN — HYDROXYZINE PAMOATE SCH MG: 25 CAPSULE ORAL at 21:54

## 2019-03-08 RX ADMIN — HEPARIN SODIUM SCH UNIT: 5000 INJECTION, SOLUTION INTRAVENOUS; SUBCUTANEOUS at 21:51

## 2019-03-08 RX ADMIN — ACETAMINOPHEN PRN MG: 325 TABLET ORAL at 19:12

## 2019-03-08 RX ADMIN — NALBUPHINE HYDROCHLORIDE PRN MG: 10 INJECTION, SOLUTION INTRAMUSCULAR; INTRAVENOUS; SUBCUTANEOUS at 21:55

## 2019-03-08 RX ADMIN — QUETIAPINE FUMARATE SCH MG: 25 TABLET, FILM COATED ORAL at 21:54

## 2019-03-08 RX ADMIN — SODIUM CHLORIDE PRN MLS/HR: 9 INJECTION, SOLUTION INTRAVENOUS at 17:35

## 2019-03-08 RX ADMIN — HYDROXYZINE PAMOATE SCH MG: 25 CAPSULE ORAL at 19:12

## 2019-03-08 RX ADMIN — PROPRANOLOL HYDROCHLORIDE SCH MG: 20 TABLET ORAL at 21:54

## 2019-03-08 RX ADMIN — Medication SCH MG: at 21:51

## 2019-03-08 RX ADMIN — ROPINIROLE HYDROCHLORIDE SCH MG: 1 TABLET, FILM COATED ORAL at 21:54

## 2019-03-08 RX ADMIN — LORAZEPAM PRN MG: 2 INJECTION INTRAMUSCULAR; INTRAVENOUS at 20:54

## 2019-03-08 RX ADMIN — DULOXETINE SCH MG: 30 CAPSULE, DELAYED RELEASE ORAL at 19:12

## 2019-03-08 RX ADMIN — PROPRANOLOL HYDROCHLORIDE SCH MG: 20 TABLET ORAL at 17:35

## 2019-03-08 NOTE — RADIOLOGY REPORT (SQ)
EXAM DESCRIPTION:  CT ABD/PELVIS WITH IV ONLY



COMPLETED DATE/TIME:  3/8/2019 12:48 pm



REASON FOR STUDY:  fall trauma SI attempt



COMPARISON:  None.



TECHNIQUE:  CT scan of the abdomen and pelvis performed using helical scanning technique with dynamic
 intravenous contrast injection.  No oral contrast. Images reviewed with lung, soft tissue, and bone 
windows. Reconstructed coronal and sagittal MPR images reviewed. Delayed images for evaluation of the
 urinary system also acquired. All images stored on PACS.

All CT scanners at this facility use dose modulation, iterative reconstruction, and/or weight based d
osing when appropriate to reduce radiation dose to as low as reasonably achievable (ALARA).

[facility]: Dose Right  [facility]: CareDose    [facility]: Dose Right    [facility]: Teradose 4D    [facility]: Smart [employer]



CONTRAST TYPE AND DOSE:  contrast/concentration: Isovue 350.00 mg/ml; Total Contrast Delivered: 100.0
 ml; Total Saline Delivered: 56.7 ml



RENAL FUNCTION:  Deferred by the emergency room physician



RADIATION DOSE:  14 mGy.



LIMITATIONS:  None.



FINDINGS:  LOWER CHEST: No significant findings. No nodules or infiltrates.

LIVER: Normal size.  Diffuse low attenuation throughout the liver from profound fatty infiltration.

SPLEEN: Normal size. No focal lesions.

PANCREAS: Peripancreatic inflammation in the retroperitoneum on coronal image 31.  No pancreatic pseu
docyst is identified.

GALLBLADDER: Multiple stones in the gallbladder

ADRENAL GLANDS: No significant masses or asymmetry.

RIGHT KIDNEY AND URETER: No solid masses.   No significant calcifications.   No hydronephrosis or hyd
roureter.

LEFT KIDNEY AND URETER: No solid masses.   No significant calcifications.   No hydronephrosis or hydr
oureter.

AORTA AND VESSELS: No aneurysm. No dissection. Renal arteries, SMA, celiac without stenosis.

RETROPERITONEUM: No retroperitoneal adenopathy, hemorrhage or masses.

BOWEL AND PERITONEAL CAVITY: No free intraperitoneal air or free intra-abdominal or pelvic fluid.  No
 CT evidence of bowel obstruction.

APPENDIX: Normal.

PELVIS: No mass.  No free fluid. Normal bladder.  Normal size female pelvic organs

ABDOMINAL WALL: No masses. No hernias.

BONES: Biconcave vertebral body deformity at L2 with kyphoplasty.  No acute fractures over the lumbar
 spine and bony pelvis

OTHER: No other significant finding.



IMPRESSION:  Fatty liver

Gallstones

Peripancreatic retroperitoneal inflammation, question pancreatitis



TECHNICAL DOCUMENTATION:  JOB ID:  [id number]

Quality ID # 436: Final reports with documentation of one or more dose reduction techniques (e.g., Au
tomated exposure control, adjustment of the mA and/or kV according to patient size, use of iterative 
reconstruction technique)

 2011 [employer]- All Rights Reserved



Reading location - IP/workstation name: [email or web address]

## 2019-03-08 NOTE — RADIOLOGY REPORT (SQ)
EXAM DESCRIPTION:  CT HEAD WITHOUT



COMPLETED DATE/TIME:  3/8/2019 12:43 pm



REASON FOR STUDY:  fall trauma SI attempt



COMPARISON:  11/3/2018



TECHNIQUE:  Axial images acquired through the brain without intravenous contrast.  Images reviewed wi
th bone, brain and subdural windows.   Images stored on PACS.

All CT scanners at this facility use dose modulation, iterative reconstruction, and/or weight based d
osing when appropriate to reduce radiation dose to as low as reasonably achievable (ALARA).

[facility]: Dose Right  [facility]: CareDose    [facility]: Dose Right    [facility]: Teradose 4D    [facility]: [email or web address]



RADIATION DOSE:  CT Rad equipment meets quality standard of care and radiation dose reduction techniq
ues were employed. CTDIvol: 53.2 mGy. DLP: 1070 mGy-cm. mGy.



LIMITATIONS:  None.



FINDINGS:  VENTRICLES: Normal size and contour.

CEREBRUM: No masses.  No hemorrhage.  No midline shift.  No evidence for acute infarction. Normal gra
y/white matter differentiation. No areas of low density in the white matter.

CEREBELLUM: No masses.  No hemorrhage.  No alteration of density.  No evidence for acute infarction.

EXTRAAXIAL SPACES: No fluid collections.  No masses.

ORBITS AND GLOBE: No intra- or extraconal masses.  Normal contour of globe without masses.

CALVARIUM: No fracture.

PARANASAL SINUSES: No fluid or mucosal thickening.

SOFT TISSUES: No mass or hematoma.

OTHER: No other significant finding.



IMPRESSION:  No acute intracranial findings.

EVIDENCE OF ACUTE STROKE: NO.



COMMENT:  Quality ID # 436: Final reports with documentation of one or more dose reduction techniques
 (e.g., Automated exposure control, adjustment of the mA and/or kV according to patient size, use of 
iterative reconstruction technique)



TECHNICAL DOCUMENTATION:  JOB ID:  [id number]

[id number]

 2011 [employer]- All Rights Reserved



Reading location - IP/workstation name: [email or web address]

## 2019-03-08 NOTE — RADIOLOGY REPORT (SQ)
EXAM DESCRIPTION:  CT CHEST WITH



COMPLETED DATE/TIME:  3/8/2019 12:48 pm



REASON FOR STUDY:  fall trauma SI attempt



COMPARISON:  CT cervical spine same date



TECHNIQUE:  CT scan of the chest performed using helical scanning technique with dynamic intravenous 
contrast injection.  Images reviewed with lung, soft tissue and bone windows.  Reconstructed coronal 
and sagittal MPR and MIP images reviewed.  All images stored on PACS.

All CT scanners at this facility use dose modulation, iterative reconstruction, and/or weight based d
osing when appropriate to reduce radiation dose to as low as reasonably achievable (ALARA).

[facility]: Dose Right  [facility]: CareDose    [facility]: Dose Right    [facility]: Teradose 4D    [facility]: [employer]



CONTRAST TYPE AND DOSE:  100 mL IV Omnipaque 350- low osmolar.



RENAL FUNCTION:  Deferred by the emergency room physician



RADIATION DOSE:  CT Rad equipment meets quality standard of care and radiation dose reduction techniq
ues were employed. CTDIvol: 9.6 - 14.4 mGy. DLP: 1528 mGy-cm. .



LIMITATIONS:  None.



FINDINGS:  LUNGS AND PLEURA: No opacities, nodules, masses.  No pneumothorax. No effusions.

HILAR AND MEDIASTINAL STRUCTURES: No identified masses or abnormal nodes.

HEART AND VASCULAR STRUCTURES: No aneurysm or dissection.  No central pulmonary emboli.  No pericardi
al effusion.

HARDWARE: None in the chest.

UPPER ABDOMEN: Fatty liver, gallstones, peripancreatic retroperitoneal fluid worrisome for acute panc
reatitis

THYROID AND OTHER SOFT TISSUES: Partially calcified 1 cm left lower pole thyroid nodule

BONES: No significant finding.

OTHER: No other significant finding.



IMPRESSION:  No acute changes



TECHNICAL DOCUMENTATION:  JOB ID:  [id number]

Quality ID # 436: Final reports with documentation of one or more dose reduction techniques (e.g., Au
tomated exposure control, adjustment of the mA and/or kV according to patient size, use of iterative 
reconstruction technique)

 2011 [employer]- All Rights Reserved



Reading location - IP/workstation name: [email or web address]

## 2019-03-08 NOTE — RADIOLOGY REPORT (SQ)
EXAM DESCRIPTION:  CT CERVICAL SPINE WITHOUT



COMPLETED DATE/TIME:  3/8/2019 12:43 pm



REASON FOR STUDY:  fall trauma SI attempt



COMPARISON:  None.



TECHNIQUE:  Axial images acquired through the cervical spine without intravenous contrast.  Images re
viewed with lung, soft tissue and bone windows.  Reconstructed coronal and sagittal MPR images review
ed.  Images stored on PACS.

All CT scanners at this facility use dose modulation, iterative reconstruction, and/or weight based d
osing when appropriate to reduce radiation dose to as low as reasonably achievable (ALARA).

[facility]: Dose Right  [facility]: CareDose    [facility]: Dose Right    [facility]: Teradose 4D    [facility]: Smart Technologies



RADIATION DOSE:  CT Rad equipment meets quality standard of care and radiation dose reduction techniq
ues were employed. CTDIvol: 22.3 mGy. DLP: 451 mGy-cm. mGy.



LIMITATIONS:  None.



FINDINGS:  ALIGNMENT: Anatomic.

MINERALIZATION: Normal.

VERTEBRAL BODIES: No fractures or dislocation.

DISCS: Multilevel disc space narrowing with osteophytes.

FACETS, LATERAL MASSES, POSTERIOR ELEMENTS: Facet arthropathy.  No fractures.  No dislocation.  No ac
[place] findings.

HARDWARE: None in the spine.

VISUALIZED RIBS: No fractures.

LUNG APICES AND SOFT TISSUES:  No acute findings.  Similar left thyroid nodule with calcifications.

OTHER: No other significant finding.



IMPRESSION:  CHRONIC DEGENERATIVE CHANGES.  NO ACUTE FINDINGS. Similar left thyroid nodule with calci
fications.



TECHNICAL DOCUMENTATION:  JOB ID:  [id number]

[id number]

Quality ID # [id number]: Final reports with documentation of one or more dose reduction techniques (e.g., Au
tomated exposure control, adjustment of the mA and/or kV according to patient size, use of iterative 
reconstruction technique)

 2011 [employer]- All Rights Reserved



Reading location - IP/workstation name: [employer]
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
## 2019-03-08 NOTE — EKG REPORT
SEVERITY:- OTHERWISE NORMAL ECG -

SINUS TACHYCARDIA

:

Confirmed by: Kailee Ricardo MD 08-Mar-2019 20:52:44
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
## 2019-03-09 LAB
ALBUMIN SERPL-MCNC: 4.5 G/DL (ref 3.5–5)
ALP SERPL-CCNC: 248 U/L (ref 38–126)
ALT SERPL-CCNC: 125 U/L (ref 9–52)
ANION GAP SERPL CALC-SCNC: 16 MMOL/L (ref 5–19)
AST SERPL-CCNC: 345 U/L (ref 14–36)
BILIRUB DIRECT SERPL-MCNC: 0.7 MG/DL (ref 0–0.4)
BILIRUB SERPL-MCNC: 1.8 MG/DL (ref 0.2–1.3)
BUN SERPL-MCNC: 5 MG/DL (ref 7–20)
CALCIUM: 9.1 MG/DL (ref 8.4–10.2)
CHLORIDE SERPL-SCNC: 83 MMOL/L (ref 98–107)
CHOLEST SERPL-MCNC: 256.45 MG/DL (ref 0–200)
CO2 SERPL-SCNC: 29 MMOL/L (ref 22–30)
ERYTHROCYTE [DISTWIDTH] IN BLOOD BY AUTOMATED COUNT: 15.8 % (ref 11.5–14)
GLUCOSE SERPL-MCNC: 99 MG/DL (ref 75–110)
HCT VFR BLD CALC: 36 % (ref 36–47)
HGB BLD-MCNC: 12.5 G/DL (ref 12–15.5)
LDLC SERPL DIRECT ASSAY-MCNC: 54 MG/DL (ref ?–100)
LIPASE SERPL-CCNC: 9352.1 U/L (ref 23–300)
MCH RBC QN AUTO: 30.3 PG (ref 27–33.4)
MCHC RBC AUTO-ENTMCNC: 34.8 G/DL (ref 32–36)
MCV RBC AUTO: 87 FL (ref 80–97)
PLATELET # BLD: 101 10^3/UL (ref 150–450)
POTASSIUM SERPL-SCNC: 2.9 MMOL/L (ref 3.6–5)
PROT SERPL-MCNC: 7.5 G/DL (ref 6.3–8.2)
RBC # BLD AUTO: 4.14 10^6/UL (ref 3.72–5.28)
SODIUM SERPL-SCNC: 128.3 MMOL/L (ref 137–145)
TRIGL SERPL-MCNC: 56 MG/DL (ref ?–150)
VLDLC SERPL CALC-MCNC: 11 MG/DL (ref 10–31)
WBC # BLD AUTO: 6.9 10^3/UL (ref 4–10.5)

## 2019-03-09 RX ADMIN — PROPRANOLOL HYDROCHLORIDE SCH MG: 20 TABLET ORAL at 09:01

## 2019-03-09 RX ADMIN — SODIUM CHLORIDE PRN MLS/HR: 9 INJECTION, SOLUTION INTRAVENOUS at 02:33

## 2019-03-09 RX ADMIN — NALBUPHINE HYDROCHLORIDE PRN MG: 10 INJECTION, SOLUTION INTRAMUSCULAR; INTRAVENOUS; SUBCUTANEOUS at 05:21

## 2019-03-09 RX ADMIN — LORAZEPAM PRN MG: 2 INJECTION INTRAMUSCULAR; INTRAVENOUS at 23:26

## 2019-03-09 RX ADMIN — AMLODIPINE BESYLATE SCH MG: 10 TABLET ORAL at 13:45

## 2019-03-09 RX ADMIN — HEPARIN SODIUM SCH UNIT: 5000 INJECTION, SOLUTION INTRAVENOUS; SUBCUTANEOUS at 13:45

## 2019-03-09 RX ADMIN — HYDROXYZINE PAMOATE SCH MG: 25 CAPSULE ORAL at 09:01

## 2019-03-09 RX ADMIN — HYDROXYZINE PAMOATE SCH MG: 25 CAPSULE ORAL at 13:45

## 2019-03-09 RX ADMIN — DULOXETINE SCH MG: 30 CAPSULE, DELAYED RELEASE ORAL at 09:01

## 2019-03-09 RX ADMIN — HYDROXYZINE PAMOATE SCH MG: 25 CAPSULE ORAL at 17:43

## 2019-03-09 RX ADMIN — POTASSIUM CHLORIDE SCH MLS/HR: 29.8 INJECTION, SOLUTION INTRAVENOUS at 17:55

## 2019-03-09 RX ADMIN — POTASSIUM CHLORIDE SCH MLS/HR: 29.8 INJECTION, SOLUTION INTRAVENOUS at 19:39

## 2019-03-09 RX ADMIN — ACETAMINOPHEN PRN MG: 325 TABLET ORAL at 21:07

## 2019-03-09 RX ADMIN — Medication SCH MG: at 13:49

## 2019-03-09 RX ADMIN — DULOXETINE SCH MG: 30 CAPSULE, DELAYED RELEASE ORAL at 17:43

## 2019-03-09 RX ADMIN — SODIUM CHLORIDE PRN MLS/HR: 9 INJECTION, SOLUTION INTRAVENOUS at 13:48

## 2019-03-09 RX ADMIN — HEPARIN SODIUM SCH UNIT: 5000 INJECTION, SOLUTION INTRAVENOUS; SUBCUTANEOUS at 05:21

## 2019-03-09 RX ADMIN — QUETIAPINE FUMARATE SCH MG: 25 TABLET, FILM COATED ORAL at 21:07

## 2019-03-09 RX ADMIN — HYDROXYZINE PAMOATE SCH MG: 25 CAPSULE ORAL at 21:07

## 2019-03-09 RX ADMIN — Medication SCH MG: at 05:21

## 2019-03-09 RX ADMIN — LORAZEPAM PRN MG: 2 INJECTION INTRAMUSCULAR; INTRAVENOUS at 09:02

## 2019-03-09 RX ADMIN — BUSPIRONE HYDROCHLORIDE SCH MG: 10 TABLET ORAL at 17:42

## 2019-03-09 RX ADMIN — Medication SCH MG: at 21:06

## 2019-03-09 RX ADMIN — HEPARIN SODIUM SCH UNIT: 5000 INJECTION, SOLUTION INTRAVENOUS; SUBCUTANEOUS at 21:07

## 2019-03-09 RX ADMIN — MAGNESIUM SULFATE IN DEXTROSE SCH MLS/HR: 10 INJECTION, SOLUTION INTRAVENOUS at 15:02

## 2019-03-09 RX ADMIN — Medication SCH ML: at 13:46

## 2019-03-09 RX ADMIN — Medication SCH: at 21:08

## 2019-03-09 RX ADMIN — LORAZEPAM PRN MG: 2 INJECTION INTRAMUSCULAR; INTRAVENOUS at 02:28

## 2019-03-09 RX ADMIN — PROPRANOLOL HYDROCHLORIDE SCH MG: 20 TABLET ORAL at 21:07

## 2019-03-09 RX ADMIN — OLANZAPINE SCH MG: 5 TABLET, FILM COATED ORAL at 17:43

## 2019-03-09 RX ADMIN — LORAZEPAM PRN MG: 2 INJECTION INTRAMUSCULAR; INTRAVENOUS at 13:46

## 2019-03-09 RX ADMIN — ROPINIROLE HYDROCHLORIDE SCH MG: 1 TABLET, FILM COATED ORAL at 21:07

## 2019-03-09 RX ADMIN — LORAZEPAM PRN MG: 2 INJECTION INTRAMUSCULAR; INTRAVENOUS at 06:53

## 2019-03-09 RX ADMIN — MAGNESIUM SULFATE IN DEXTROSE SCH MLS/HR: 10 INJECTION, SOLUTION INTRAVENOUS at 13:45

## 2019-03-09 RX ADMIN — Medication SCH: at 05:22

## 2019-03-09 NOTE — PDOC PROGRESS REPORT
Subjective


Progress Note for:: 03/09/19


Subjective:: 





The patient was seen earlier today on rounds.  The patient was lying in bed.  

The patient was more awake and alert but still very slow to process.  The 

patient has been tolerating her clear liquids without issue and denies any pain 

when asked.  The patient denies any nausea, vomiting, diarrhea, shortness of 

breath, dizziness, chest pain, heart palpitations, fevers, or chills.  The 

patient has remained afebrile.  Blood pressures have been elevated.  When 

prompted the patient voices no other concerns at this time.  





Review of systems: The rest of the review of systems is negative.


Reason For Visit: 


PANCREATITIS, ETOH INTOXICATION








Physical Exam


Vital Signs: 


                                        











Temp Pulse Resp BP Pulse Ox


 


 97.4 F   74   16   180/78 H  94 


 


 03/09/19 08:28  03/09/19 10:10  03/09/19 08:28  03/09/19 10:10  03/09/19 08:28








                                 Intake & Output











 03/07/19 03/08/19 03/09/19





 23:59 23:59 23:59


 


Intake Total  1500 2574


 


Balance  1500 2574


 


Weight  69.7 kg 70.9 kg








On examination the patient is awake, alert, oriented, to place but without 

insight to situation.  The patient is verbal, conversational, ambulatory with 

assistance.  Does not appear to be in any acute distress.


Skin is warm and dry, no rash, not diaphoretic.  Multiple areas of bruising.


HEENT: no JVD.  Pupils are reactive.


CVS: Heart is regular, is no murmur or rub.


Chest: Is clear to auscultation.


Abdomen: Is soft, bowel sounds present.


Extremities: There is no edema.  Healing cuts.


Psychiatric: Wernicke's type presentation


Neurological: Uncooperative





Results


Laboratory Results: 


                                        





                                 03/09/19 07:09 





                                 03/09/19 08:58 





                                        











  03/08/19 03/09/19 03/09/19





  11:47 07:09 07:09


 


WBC    6.9


 


RBC    4.14


 


Hgb    12.5


 


Hct    36.0


 


MCV    87


 


MCH    30.3


 


MCHC    34.8


 


RDW    15.8 H


 


Plt Count    101 L


 


Sodium   


 


Potassium   


 


Chloride   


 


Carbon Dioxide   


 


Anion Gap   


 


BUN   


 


Creatinine   


 


Est GFR ( Amer)   


 


Est GFR (Non-Af Amer)   


 


Glucose   


 


Calcium   


 


Magnesium  1.9  Cancelled 


 


Total Bilirubin   


 


AST   


 


ALT   


 


Alkaline Phosphatase   


 


Total Protein   


 


Albumin   


 


Triglycerides   Cancelled 


 


Cholesterol   Cancelled 


 


LDL Cholesterol Direct   Cancelled 


 


VLDL Cholesterol   Cancelled 


 


HDL Cholesterol   Cancelled 


 


Lipase   Cancelled 














  03/09/19 03/09/19





  08:58 08:58


 


WBC  


 


RBC  


 


Hgb  


 


Hct  


 


MCV  


 


MCH  


 


MCHC  


 


RDW  


 


Plt Count  


 


Sodium   128.3 L


 


Potassium   2.9 L* D


 


Chloride   83 L


 


Carbon Dioxide   29


 


Anion Gap   16


 


BUN   5 L


 


Creatinine   0.34 L


 


Est GFR ( Amer)   > 60


 


Est GFR (Non-Af Amer)   > 60


 


Glucose   99


 


Calcium   9.1


 


Magnesium  1.5 L 


 


Total Bilirubin   1.8 H


 


AST   345 H


 


ALT   125 H


 


Alkaline Phosphatase   248 H


 


Total Protein   7.5


 


Albumin   4.5


 


Triglycerides  56 


 


Cholesterol  256.45 H 


 


LDL Cholesterol Direct  54 


 


VLDL Cholesterol  11.0 


 


HDL Cholesterol  86 


 


Lipase  9352.1 H 








                                        











  03/08/19





  11:47


 


Troponin I  < 0.012











Impressions: 


                                        





Abdomen/Pelvis CT  03/08/19 11:42


IMPRESSION:  Fatty liver


Gallstones


Peripancreatic retroperitoneal inflammation, question pancreatitis


 








Cervical Spine CT  03/08/19 11:42


IMPRESSION:  CHRONIC DEGENERATIVE CHANGES.  NO ACUTE FINDINGS. Similar left 

thyroid nodule with calcifications.


 








Chest CT  03/08/19 11:42


IMPRESSION:  No acute changes


 








Head CT  03/08/19 11:42


IMPRESSION:  No acute intracranial findings.


EVIDENCE OF ACUTE STROKE: NO.


 














Assessment & Plan





- Diagnosis


(1) Acute alcohol intoxication


Qualifiers: 


   Complication of substance-induced condition: uncomplicated   Qualified 

Code(s): F10.920 - Alcohol use, unspecified with intoxication, uncomplicated   


Is this a current diagnosis for this admission?: Yes   


Plan: 


Continue CIWA protocol.  Replete magnesium level.  Supplement high-dose 

thiamine.








(2) Alcohol dependence, continuous


Is this a current diagnosis for this admission?: Yes   


Plan: 


As per #1








(3) Hyponatremia


Is this a current diagnosis for this admission?: Yes   


Plan: 


Secondary to the patient's alcoholism.  Patient is being gently hydrated.








(4) Pancreatitis


Qualifiers: 


   Pancreatitis type: unspecified pancreatitis type   Acute pancreatitis 

complication: unspecified 


Is this a current diagnosis for this admission?: Yes   


Plan: 


Most likely due to her alcohol intake.  Will continue to hydrate and continue 

fat-free clear liquids.








(5) Suicide attempt


Is this a current diagnosis for this admission?: Yes   


Plan: 


The patient has been IV DC'd and will be followed by mental health.  Patient can

be placed when medically cleared.








(6) Hypokalemia


Is this a current diagnosis for this admission?: Yes   


Plan: 


Will replete after magnesium.








(7) Alcoholic hepatitis


Qualifiers: 


   Ascites presence: without ascites   Qualified Code(s): K70.10 - Alcoholic 

hepatitis without ascites   


Is this a current diagnosis for this admission?: Yes   


Plan: 


Most likely has an underlying cirrhosis.  It is trending down we will follow 

LFTs.








- Time


Time Spent with patient: 25-34 minutes


Medications reviewed and adjusted accordingly: Yes


Anticipated discharge: Other - Alcohol rehab


Within: within 72 hours


Disposition: 


The patient is a full code.  Pending patient's symptomatology and diagnostic 

findings will reevaluate in the a.m.

## 2019-03-10 LAB
ALBUMIN SERPL-MCNC: 4.4 G/DL (ref 3.5–5)
ALP SERPL-CCNC: 250 U/L (ref 38–126)
ALT SERPL-CCNC: 91 U/L (ref 9–52)
ANION GAP SERPL CALC-SCNC: 15 MMOL/L (ref 5–19)
AST SERPL-CCNC: 157 U/L (ref 14–36)
BILIRUB DIRECT SERPL-MCNC: 0.6 MG/DL (ref 0–0.4)
BILIRUB SERPL-MCNC: 1.8 MG/DL (ref 0.2–1.3)
BUN SERPL-MCNC: 5 MG/DL (ref 7–20)
CALCIUM: 9.6 MG/DL (ref 8.4–10.2)
CHLORIDE SERPL-SCNC: 84 MMOL/L (ref 98–107)
CO2 SERPL-SCNC: 27 MMOL/L (ref 22–30)
GLUCOSE SERPL-MCNC: 123 MG/DL (ref 75–110)
LIPASE SERPL-CCNC: 2927.3 U/L (ref 23–300)
POTASSIUM SERPL-SCNC: 3.1 MMOL/L (ref 3.6–5)
PROT SERPL-MCNC: 7.5 G/DL (ref 6.3–8.2)
SODIUM SERPL-SCNC: 125.6 MMOL/L (ref 137–145)

## 2019-03-10 RX ADMIN — Medication SCH MG: at 21:49

## 2019-03-10 RX ADMIN — DULOXETINE SCH MG: 30 CAPSULE, DELAYED RELEASE ORAL at 17:37

## 2019-03-10 RX ADMIN — SODIUM CHLORIDE PRN MLS/HR: 9 INJECTION, SOLUTION INTRAVENOUS at 19:00

## 2019-03-10 RX ADMIN — HYDROXYZINE PAMOATE SCH MG: 25 CAPSULE ORAL at 14:22

## 2019-03-10 RX ADMIN — NICOTINE SCH EACH: 21 PATCH, EXTENDED RELEASE TOPICAL at 09:04

## 2019-03-10 RX ADMIN — Medication SCH MG: at 14:23

## 2019-03-10 RX ADMIN — Medication SCH: at 14:24

## 2019-03-10 RX ADMIN — LORAZEPAM PRN MG: 2 INJECTION INTRAMUSCULAR; INTRAVENOUS at 14:23

## 2019-03-10 RX ADMIN — LORAZEPAM PRN MG: 2 INJECTION INTRAMUSCULAR; INTRAVENOUS at 03:20

## 2019-03-10 RX ADMIN — MAGNESIUM SULFATE IN DEXTROSE SCH MLS/HR: 10 INJECTION, SOLUTION INTRAVENOUS at 14:24

## 2019-03-10 RX ADMIN — SODIUM CHLORIDE PRN MLS/HR: 9 INJECTION, SOLUTION INTRAVENOUS at 01:16

## 2019-03-10 RX ADMIN — POTASSIUM CHLORIDE SCH MLS/HR: 29.8 INJECTION, SOLUTION INTRAVENOUS at 17:39

## 2019-03-10 RX ADMIN — DULOXETINE SCH MG: 30 CAPSULE, DELAYED RELEASE ORAL at 09:02

## 2019-03-10 RX ADMIN — BUSPIRONE HYDROCHLORIDE SCH MG: 10 TABLET ORAL at 17:37

## 2019-03-10 RX ADMIN — QUETIAPINE FUMARATE SCH MG: 25 TABLET, FILM COATED ORAL at 21:49

## 2019-03-10 RX ADMIN — HEPARIN SODIUM SCH UNIT: 5000 INJECTION, SOLUTION INTRAVENOUS; SUBCUTANEOUS at 05:30

## 2019-03-10 RX ADMIN — Medication SCH MG: at 05:30

## 2019-03-10 RX ADMIN — LORAZEPAM PRN MG: 2 INJECTION INTRAMUSCULAR; INTRAVENOUS at 09:03

## 2019-03-10 RX ADMIN — LORAZEPAM PRN MG: 2 INJECTION INTRAMUSCULAR; INTRAVENOUS at 23:46

## 2019-03-10 RX ADMIN — Medication SCH: at 21:55

## 2019-03-10 RX ADMIN — Medication SCH: at 05:32

## 2019-03-10 RX ADMIN — PROPRANOLOL HYDROCHLORIDE SCH MG: 20 TABLET ORAL at 09:03

## 2019-03-10 RX ADMIN — AMLODIPINE BESYLATE SCH MG: 10 TABLET ORAL at 09:03

## 2019-03-10 RX ADMIN — BUSPIRONE HYDROCHLORIDE SCH MG: 10 TABLET ORAL at 09:03

## 2019-03-10 RX ADMIN — SODIUM CHLORIDE PRN MLS/HR: 9 INJECTION, SOLUTION INTRAVENOUS at 09:58

## 2019-03-10 RX ADMIN — MAGNESIUM SULFATE IN DEXTROSE SCH MLS/HR: 10 INJECTION, SOLUTION INTRAVENOUS at 15:34

## 2019-03-10 RX ADMIN — ROPINIROLE HYDROCHLORIDE SCH MG: 1 TABLET, FILM COATED ORAL at 21:48

## 2019-03-10 RX ADMIN — BENZTROPINE MESYLATE SCH MG: 1 TABLET ORAL at 09:04

## 2019-03-10 RX ADMIN — HEPARIN SODIUM SCH UNIT: 5000 INJECTION, SOLUTION INTRAVENOUS; SUBCUTANEOUS at 14:23

## 2019-03-10 RX ADMIN — HYDROXYZINE PAMOATE SCH MG: 25 CAPSULE ORAL at 09:02

## 2019-03-10 RX ADMIN — HEPARIN SODIUM SCH: 5000 INJECTION, SOLUTION INTRAVENOUS; SUBCUTANEOUS at 21:45

## 2019-03-10 RX ADMIN — PROPRANOLOL HYDROCHLORIDE SCH MG: 20 TABLET ORAL at 21:48

## 2019-03-10 RX ADMIN — OLANZAPINE SCH MG: 5 TABLET, FILM COATED ORAL at 17:38

## 2019-03-10 RX ADMIN — OLANZAPINE SCH MG: 5 TABLET, FILM COATED ORAL at 09:02

## 2019-03-10 RX ADMIN — HYDROXYZINE PAMOATE SCH MG: 25 CAPSULE ORAL at 17:38

## 2019-03-10 RX ADMIN — POTASSIUM CHLORIDE SCH MLS/HR: 29.8 INJECTION, SOLUTION INTRAVENOUS at 18:59

## 2019-03-10 RX ADMIN — HYDROXYZINE PAMOATE SCH MG: 25 CAPSULE ORAL at 21:48

## 2019-03-10 NOTE — PDOC PROGRESS REPORT
Subjective


Progress Note for:: 03/10/19


Subjective:: 





The patient was seen earlier today on rounds.  The patient was lying in bed.  

The patient was more awake and alert but still very slow to process.  The 

patient has been tolerating her clear liquids without issue and denies any pain 

when asked.  The patient denies any nausea, vomiting, diarrhea, shortness of 

breath, dizziness, chest pain, heart palpitations, fevers, or chills.  The 

patient has remained afebrile.  Blood pressures have been elevated.  Remains 

confused.





Brief history:


The patient was admitted on Friday due to dehydration, hyponatremia, alcohol int

oxication, alcoholic pancreatitis, and suicide attempt by slashing her wrist.  

The patient was admitted to [facility] and was placed on CIWA protocol.  The patient 

has been supplemented high-dose thiamine and has been hydrated.  Of course the 

patient has required significant electrolyte correction including magnesium and 

potassium.  The patient has been evaluated by the psychiatric service for whom 

she is familiar with.  Medication adjustments have been made by recommendation. 

Once the patient is stabilized she is to go to [facility].


Reason For Visit: 


PANCREATITIS, ETOH INTOXICATION








Physical Exam


Vital Signs: 


                                        











Temp Pulse Resp BP Pulse Ox


 


 97.7 F   97   22 H  155/89 H  95 


 


 03/10/19 11:43  03/10/19 13:32  03/10/19 11:43  03/10/19 11:43  03/10/19 11:43








                                 Intake & Output











 03/08/19 03/09/19 03/11/19





 23:59 23:59 00:59


 


Intake Total 1500 3989 1083


 


Output Total  1700 700


 


Balance 1500 2289 383


 


Weight 69.7 kg 70.9 kg 70.5 kg








On examination the patient is awake, alert, oriented, to place but without 

insight to situation.  The patient is verbal, conversational, ambulatory with 

assistance.  Does not appear to be in any acute distress.


Skin is warm and dry, no rash, not diaphoretic.  Multiple areas of bruising.


HEENT: no JVD.  Pupils are reactive.


CVS: Heart is regular, is no murmur or rub.


Chest: Is clear to auscultation.


Abdomen: Is soft, bowel sounds present.


Extremities: There is no edema.  Healing cuts.


Psychiatric: Remains delirious


Neurological: Uncooperative





Results


Laboratory Results: 


                                        





                                 03/09/19 07:09 





                                 03/10/19 12:02 





                                        











  03/09/19 03/10/19 03/10/19





  08:58 12:02 12:02


 


Sodium  128.3 L  125.6 L 


 


Potassium  2.9 L* D  3.1 L 


 


Chloride  83 L  84 L 


 


Carbon Dioxide  29  27 


 


Anion Gap  16  15 


 


BUN  5 L  5 L 


 


Creatinine  0.34 L  0.34 L 


 


Est GFR ( Amer)  > 60  > 60 


 


Est GFR (Non-Af Amer)  > 60  > 60 


 


Glucose  99  123 H 


 


Calcium  9.1  9.6 


 


Magnesium   1.5 L 


 


Total Bilirubin  1.8 H  1.8 H 


 


AST  345 H  157 H 


 


ALT  125 H  91 H 


 


Alkaline Phosphatase  248 H  250 H 


 


Ammonia    10.0


 


Total Protein  7.5  7.5 


 


Albumin  4.5  4.4 


 


Lipase   2927.3 H 








                                        











  03/08/19





  11:47


 


Troponin I  < 0.012











Impressions: 


                                        





Abdomen/Pelvis CT  03/08/19 11:42


IMPRESSION:  Fatty liver


Gallstones


Peripancreatic retroperitoneal inflammation, question pancreatitis


 








Cervical Spine CT  03/08/19 11:42


IMPRESSION:  CHRONIC DEGENERATIVE CHANGES.  NO ACUTE FINDINGS. Similar left 

thyroid nodule with calcifications.


 








Chest CT  03/08/19 11:42


IMPRESSION:  No acute changes


 








Head CT  03/08/19 11:42


IMPRESSION:  No acute intracranial findings.


EVIDENCE OF ACUTE STROKE: NO.


 














Assessment & Plan





- Diagnosis


(1) Acute alcohol intoxication


Qualifiers: 


   Complication of substance-induced condition: uncomplicated   Qualified 

Code(s): F10.920 - Alcohol use, unspecified with intoxication, uncomplicated   


Is this a current diagnosis for this admission?: Yes   


Plan: 


Continue CIWA protocol.  Replete magnesium level.  Supplement high-dose 

thiamine.








(2) Alcohol dependence, continuous


Is this a current diagnosis for this admission?: Yes   


Plan: 


As per #1








(3) Hyponatremia


Is this a current diagnosis for this admission?: Yes   


Plan: 


Secondary to the patient's alcoholism and most likely underlying cirrhosis








(4) Pancreatitis


Qualifiers: 


   Chronicity: acute   Pancreatitis type: alcohol induced   Acute pancreatitis 

complication: unspecified   Qualified Code(s): K85.20 - Alcohol induced acute 

pancreatitis without necrosis or infection   


Is this a current diagnosis for this admission?: Yes   


Plan: 


Most likely due to her alcohol intake.  Will continue to hydrate and continue 

fat-free full liquids








(5) Suicide attempt


Is this a current diagnosis for this admission?: Yes   


Plan: 


The patient was IV DC'd and will be followed by mental health.  Psych has 

rescinded the IVC.  Patient can be placed when medically cleared.








(6) Hypokalemia


Is this a current diagnosis for this admission?: Yes   


Plan: 


Will replete after magnesium.








(7) Alcoholic hepatitis


Qualifiers: 


   Ascites presence: without ascites   Qualified Code(s): K70.10 - Alcoholic 

hepatitis without ascites   


Is this a current diagnosis for this admission?: Yes   


Plan: 


Most likely has an underlying cirrhosis.  Transaminitis is clearing








- Time


Time Spent with patient: 25-34 minutes


Medications reviewed and adjusted accordingly: Yes


Anticipated discharge: Other - [facility]


Within: within 48 hours


Disposition: 





The patient is a full code.  Pending patient's symptomatology and diagnostic 

findings will reevaluate in the a.m.
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
## 2019-03-11 LAB
ALBUMIN SERPL-MCNC: 3.1 G/DL (ref 3.5–5)
ALP SERPL-CCNC: 185 U/L (ref 38–126)
ALT SERPL-CCNC: 57 U/L (ref 9–52)
ANION GAP SERPL CALC-SCNC: 11 MMOL/L (ref 5–19)
APPEARANCE UR: (no result)
APTT PPP: (no result) S
AST SERPL-CCNC: 84 U/L (ref 14–36)
BILIRUB DIRECT SERPL-MCNC: 0.3 MG/DL (ref 0–0.4)
BILIRUB SERPL-MCNC: 1.3 MG/DL (ref 0.2–1.3)
BILIRUB UR QL STRIP: NEGATIVE
BUN SERPL-MCNC: 8 MG/DL (ref 7–20)
CALCIUM: 8.6 MG/DL (ref 8.4–10.2)
CHLORIDE SERPL-SCNC: 92 MMOL/L (ref 98–107)
CO2 SERPL-SCNC: 25 MMOL/L (ref 22–30)
GLUCOSE SERPL-MCNC: 110 MG/DL (ref 75–110)
GLUCOSE UR STRIP-MCNC: NEGATIVE MG/DL
KETONES UR STRIP-MCNC: (no result) MG/DL
LIPASE SERPL-CCNC: 1005.1 U/L (ref 23–300)
NITRITE UR QL STRIP: NEGATIVE
PH UR STRIP: 6 [PH] (ref 5–9)
POTASSIUM SERPL-SCNC: 2.7 MMOL/L (ref 3.6–5)
PROT SERPL-MCNC: 5.8 G/DL (ref 6.3–8.2)
PROT UR STRIP-MCNC: 100 MG/DL
SODIUM SERPL-SCNC: 128.3 MMOL/L (ref 137–145)
SP GR UR STRIP: 1.02
UROBILINOGEN UR-MCNC: 2 MG/DL (ref ?–2)

## 2019-03-11 RX ADMIN — POTASSIUM CHLORIDE SCH MLS/HR: 29.8 INJECTION, SOLUTION INTRAVENOUS at 09:08

## 2019-03-11 RX ADMIN — BENZTROPINE MESYLATE SCH MG: 1 TABLET ORAL at 09:52

## 2019-03-11 RX ADMIN — PROPRANOLOL HYDROCHLORIDE SCH MG: 20 TABLET ORAL at 22:16

## 2019-03-11 RX ADMIN — HEPARIN SODIUM SCH: 5000 INJECTION, SOLUTION INTRAVENOUS; SUBCUTANEOUS at 21:58

## 2019-03-11 RX ADMIN — Medication SCH MG: at 23:53

## 2019-03-11 RX ADMIN — SODIUM CHLORIDE PRN MLS/HR: 9 INJECTION, SOLUTION INTRAVENOUS at 19:59

## 2019-03-11 RX ADMIN — Medication SCH MG: at 22:16

## 2019-03-11 RX ADMIN — QUETIAPINE FUMARATE SCH MG: 25 TABLET, FILM COATED ORAL at 22:16

## 2019-03-11 RX ADMIN — AMLODIPINE BESYLATE SCH MG: 10 TABLET ORAL at 09:53

## 2019-03-11 RX ADMIN — LORAZEPAM PRN MG: 2 INJECTION INTRAMUSCULAR; INTRAVENOUS at 18:34

## 2019-03-11 RX ADMIN — Medication SCH MG: at 06:12

## 2019-03-11 RX ADMIN — METRONIDAZOLE SCH MG: 500 TABLET ORAL at 23:53

## 2019-03-11 RX ADMIN — Medication SCH: at 06:13

## 2019-03-11 RX ADMIN — HEPARIN SODIUM SCH: 5000 INJECTION, SOLUTION INTRAVENOUS; SUBCUTANEOUS at 14:18

## 2019-03-11 RX ADMIN — SODIUM CHLORIDE PRN MLS/HR: 9 INJECTION, SOLUTION INTRAVENOUS at 03:18

## 2019-03-11 RX ADMIN — HYDROXYZINE PAMOATE SCH MG: 25 CAPSULE ORAL at 14:46

## 2019-03-11 RX ADMIN — SODIUM CHLORIDE PRN MLS/HR: 9 INJECTION, SOLUTION INTRAVENOUS at 11:37

## 2019-03-11 RX ADMIN — PROPRANOLOL HYDROCHLORIDE SCH MG: 20 TABLET ORAL at 09:53

## 2019-03-11 RX ADMIN — BUSPIRONE HYDROCHLORIDE SCH MG: 10 TABLET ORAL at 09:53

## 2019-03-11 RX ADMIN — DULOXETINE SCH MG: 30 CAPSULE, DELAYED RELEASE ORAL at 09:53

## 2019-03-11 RX ADMIN — Medication SCH: at 14:22

## 2019-03-11 RX ADMIN — HEPARIN SODIUM SCH: 5000 INJECTION, SOLUTION INTRAVENOUS; SUBCUTANEOUS at 06:11

## 2019-03-11 RX ADMIN — ROPINIROLE HYDROCHLORIDE SCH MG: 1 TABLET, FILM COATED ORAL at 22:16

## 2019-03-11 RX ADMIN — OLANZAPINE SCH MG: 5 TABLET, FILM COATED ORAL at 09:53

## 2019-03-11 RX ADMIN — Medication SCH: at 21:58

## 2019-03-11 RX ADMIN — NICOTINE SCH EACH: 21 PATCH, EXTENDED RELEASE TOPICAL at 09:53

## 2019-03-11 RX ADMIN — HYDROXYZINE PAMOATE SCH MG: 25 CAPSULE ORAL at 09:52

## 2019-03-11 RX ADMIN — HYDROXYZINE PAMOATE SCH MG: 25 CAPSULE ORAL at 22:16

## 2019-03-11 RX ADMIN — HYDROXYZINE PAMOATE SCH MG: 25 CAPSULE ORAL at 18:06

## 2019-03-11 RX ADMIN — DULOXETINE SCH MG: 30 CAPSULE, DELAYED RELEASE ORAL at 18:06

## 2019-03-11 RX ADMIN — METRONIDAZOLE SCH MG: 500 TABLET ORAL at 18:06

## 2019-03-11 RX ADMIN — OLANZAPINE SCH MG: 5 TABLET, FILM COATED ORAL at 18:07

## 2019-03-11 RX ADMIN — BUSPIRONE HYDROCHLORIDE SCH MG: 10 TABLET ORAL at 18:07

## 2019-03-11 RX ADMIN — Medication SCH MG: at 18:07

## 2019-03-11 RX ADMIN — Medication SCH MG: at 14:46

## 2019-03-11 RX ADMIN — POTASSIUM CHLORIDE SCH MLS/HR: 29.8 INJECTION, SOLUTION INTRAVENOUS at 06:56

## 2019-03-11 NOTE — PDOC PROGRESS REPORT
Subjective


Progress Note for:: 03/11/19


Subjective:: 


This is a 58 years old  female patient with past medical history of 

depression and alcohol abuse brought by EMS for being suicide.  Her initial 

blood work shows markedly elevated lipase of 9352 she has also elevated liver 

chemistry.  Her BMP shows hypokalemia with potassium of 2.7.  This morning I 

seen patient resting in bed and she has foul-smelling diarrhea which tested 

positive for C. difficile colitis.


Reason For Visit: 


PANCREATITIS, ETOH INTOXICATION








Physical Exam


Vital Signs: 


                                        











Temp Pulse Resp BP Pulse Ox


 


 98.4 F   74   18   122/73   99 


 


 03/11/19 11:21  03/11/19 11:21  03/11/19 11:21  03/11/19 11:21  03/11/19 11:21








                                 Intake & Output











 03/10/19 03/11/19 03/12/19





 06:59 06:59 06:59


 


Intake Total  3521 1150


 


Output Total   


 


Balance  3521 1150


 


Weight  70.5 kg 














Results


Laboratory Results: 


                                        





                                 03/09/19 07:09 





                                 03/11/19 05:20 





                                        











  03/11/19 03/11/19





  05:20 12:30


 


Sodium  128.3 L 


 


Potassium  2.7 L* 


 


Chloride  92 L 


 


Carbon Dioxide  25 


 


Anion Gap  11 


 


BUN  8 


 


Creatinine  0.43 L 


 


Est GFR ( Amer)  > 60 


 


Est GFR (Non-Af Amer)  > 60 


 


Glucose  110 


 


Calcium  8.6 


 


Magnesium  1.9 


 


Total Bilirubin  1.3 


 


AST  84 H 


 


ALT  57 H 


 


Alkaline Phosphatase  185 H 


 


Total Protein  5.8 L 


 


Albumin  3.1 L 


 


Lipase  1005.1 H 


 


Stool Occult Blood   NEGATIVE








                                        











  03/08/19





  11:47


 


Troponin I  < 0.012











Impressions: 


                                        





Abdomen/Pelvis CT  03/08/19 11:42


IMPRESSION:  Fatty liver


Gallstones


Peripancreatic retroperitoneal inflammation, question pancreatitis


 








Cervical Spine CT  03/08/19 11:42


IMPRESSION:  CHRONIC DEGENERATIVE CHANGES.  NO ACUTE FINDINGS. Similar left 

thyroid nodule with calcifications.


 








Chest CT  03/08/19 11:42


IMPRESSION:  No acute changes


 








Head CT  03/08/19 11:42


IMPRESSION:  No acute intracranial findings.


EVIDENCE OF ACUTE STROKE: NO.


 














Assessment & Plan





- Diagnosis


(1) Clostridium difficile colitis


Is this a current diagnosis for this admission?: Yes   


Plan: 


Patient has been started on Flagyl and vancomycin.








(2) Hypokalemia


Is this a current diagnosis for this admission?: Yes   


Plan: 


Repleted








(3) Acute pancreatitis


Qualifiers: 


   Pancreatitis type: alcohol induced 


Is this a current diagnosis for this admission?: Yes   


Plan: 


Bowel rest aggressive hydration and pain control.








(4) Suicide attempt


Is this a current diagnosis for this admission?: Yes   


Plan: 


Patient denies she is suicidal.  Had been evaluated by psych and she is clipped.

## 2019-03-12 LAB
ANION GAP SERPL CALC-SCNC: 9 MMOL/L (ref 5–19)
BUN SERPL-MCNC: 5 MG/DL (ref 7–20)
CALCIUM: 8.4 MG/DL (ref 8.4–10.2)
CHLORIDE SERPL-SCNC: 98 MMOL/L (ref 98–107)
CO2 SERPL-SCNC: 24 MMOL/L (ref 22–30)
GLUCOSE SERPL-MCNC: 125 MG/DL (ref 75–110)
POTASSIUM SERPL-SCNC: 2.7 MMOL/L (ref 3.6–5)
SODIUM SERPL-SCNC: 131.1 MMOL/L (ref 137–145)

## 2019-03-12 RX ADMIN — POTASSIUM CHLORIDE SCH MLS/HR: 29.8 INJECTION, SOLUTION INTRAVENOUS at 17:33

## 2019-03-12 RX ADMIN — METRONIDAZOLE SCH MG: 500 TABLET ORAL at 05:30

## 2019-03-12 RX ADMIN — Medication SCH MG: at 11:40

## 2019-03-12 RX ADMIN — ROPINIROLE HYDROCHLORIDE SCH MG: 1 TABLET, FILM COATED ORAL at 21:25

## 2019-03-12 RX ADMIN — BENZTROPINE MESYLATE SCH MG: 1 TABLET ORAL at 09:11

## 2019-03-12 RX ADMIN — Medication SCH MG: at 23:26

## 2019-03-12 RX ADMIN — HYDROXYZINE PAMOATE SCH MG: 25 CAPSULE ORAL at 21:26

## 2019-03-12 RX ADMIN — Medication SCH MG: at 05:30

## 2019-03-12 RX ADMIN — NALBUPHINE HYDROCHLORIDE PRN MG: 10 INJECTION, SOLUTION INTRAMUSCULAR; INTRAVENOUS; SUBCUTANEOUS at 11:59

## 2019-03-12 RX ADMIN — QUETIAPINE FUMARATE SCH MG: 25 TABLET, FILM COATED ORAL at 21:26

## 2019-03-12 RX ADMIN — Medication SCH: at 13:16

## 2019-03-12 RX ADMIN — PROPRANOLOL HYDROCHLORIDE SCH MG: 20 TABLET ORAL at 09:11

## 2019-03-12 RX ADMIN — HEPARIN SODIUM SCH: 5000 INJECTION, SOLUTION INTRAVENOUS; SUBCUTANEOUS at 21:14

## 2019-03-12 RX ADMIN — METRONIDAZOLE SCH MG: 500 TABLET ORAL at 18:51

## 2019-03-12 RX ADMIN — OLANZAPINE SCH MG: 5 TABLET, FILM COATED ORAL at 09:11

## 2019-03-12 RX ADMIN — HYDROXYZINE PAMOATE SCH MG: 25 CAPSULE ORAL at 17:33

## 2019-03-12 RX ADMIN — HEPARIN SODIUM SCH: 5000 INJECTION, SOLUTION INTRAVENOUS; SUBCUTANEOUS at 05:24

## 2019-03-12 RX ADMIN — Medication SCH: at 21:14

## 2019-03-12 RX ADMIN — Medication SCH MG: at 18:51

## 2019-03-12 RX ADMIN — POTASSIUM CHLORIDE SCH MLS/HR: 29.8 INJECTION, SOLUTION INTRAVENOUS at 19:47

## 2019-03-12 RX ADMIN — POTASSIUM CHLORIDE SCH MLS/HR: 29.8 INJECTION, SOLUTION INTRAVENOUS at 13:32

## 2019-03-12 RX ADMIN — BUSPIRONE HYDROCHLORIDE SCH MG: 10 TABLET ORAL at 09:11

## 2019-03-12 RX ADMIN — NICOTINE SCH EACH: 21 PATCH, EXTENDED RELEASE TOPICAL at 09:11

## 2019-03-12 RX ADMIN — LORAZEPAM PRN MG: 2 INJECTION INTRAMUSCULAR; INTRAVENOUS at 18:51

## 2019-03-12 RX ADMIN — POTASSIUM CHLORIDE SCH MLS/HR: 29.8 INJECTION, SOLUTION INTRAVENOUS at 15:26

## 2019-03-12 RX ADMIN — BUSPIRONE HYDROCHLORIDE SCH MG: 10 TABLET ORAL at 17:33

## 2019-03-12 RX ADMIN — HYDROXYZINE PAMOATE SCH MG: 25 CAPSULE ORAL at 13:32

## 2019-03-12 RX ADMIN — Medication SCH MG: at 13:32

## 2019-03-12 RX ADMIN — HEPARIN SODIUM SCH: 5000 INJECTION, SOLUTION INTRAVENOUS; SUBCUTANEOUS at 13:16

## 2019-03-12 RX ADMIN — DULOXETINE SCH MG: 30 CAPSULE, DELAYED RELEASE ORAL at 09:11

## 2019-03-12 RX ADMIN — METRONIDAZOLE SCH MG: 500 TABLET ORAL at 11:40

## 2019-03-12 RX ADMIN — DULOXETINE SCH MG: 30 CAPSULE, DELAYED RELEASE ORAL at 17:32

## 2019-03-12 RX ADMIN — Medication SCH: at 05:25

## 2019-03-12 RX ADMIN — OLANZAPINE SCH MG: 5 TABLET, FILM COATED ORAL at 17:33

## 2019-03-12 RX ADMIN — AMLODIPINE BESYLATE SCH MG: 10 TABLET ORAL at 09:10

## 2019-03-12 RX ADMIN — HYDROXYZINE PAMOATE SCH MG: 25 CAPSULE ORAL at 09:11

## 2019-03-12 RX ADMIN — SODIUM CHLORIDE PRN MLS/HR: 9 INJECTION, SOLUTION INTRAVENOUS at 12:06

## 2019-03-12 RX ADMIN — METRONIDAZOLE SCH MG: 500 TABLET ORAL at 23:26

## 2019-03-12 RX ADMIN — PROPRANOLOL HYDROCHLORIDE SCH MG: 20 TABLET ORAL at 21:25

## 2019-03-12 RX ADMIN — Medication SCH MG: at 21:26

## 2019-03-12 RX ADMIN — ACETAMINOPHEN PRN MG: 325 TABLET ORAL at 21:26

## 2019-03-12 RX ADMIN — SODIUM CHLORIDE PRN MLS/HR: 9 INJECTION, SOLUTION INTRAVENOUS at 04:05

## 2019-03-12 NOTE — PDOC PROGRESS REPORT
Subjective


Progress Note for:: 03/12/19


Subjective:: 


I seen patient resting in bed comfortably.  She does not have new complaint.  

And she reports that the frequency of her diarrhea is relatively decreasing.  

Her repeat BMP shows still she has hypokalemia with potassium of 2.7.  Despite 

getting K rider.


Reason For Visit: 


PANCREATITIS, ETOH INTOXICATION








Physical Exam


Vital Signs: 


                                        











Temp Pulse Resp BP Pulse Ox


 


 98.4 F   81   17   125/68   96 


 


 03/12/19 11:39  03/12/19 11:39  03/12/19 11:39  03/12/19 11:39  03/12/19 11:39








                                 Intake & Output











 03/11/19 03/12/19 03/13/19





 06:59 06:59 06:59


 


Intake Total 3521 3268 1100


 


Balance 3521 3268 1100


 


Weight 70.5 kg 74.3 kg 











General appearance: PRESENT: no acute distress


Eye exam: PRESENT: conjunctiva pink


Mouth exam: PRESENT: moist


Neck exam: ABSENT: carotid bruit, JVD, lymphadenopathy, thyromegaly


Respiratory exam: PRESENT: clear to auscultation hill.  ABSENT: rales, rhonchi, 

wheezes


Cardiovascular exam: PRESENT: RRR.  ABSENT: diastolic murmur, rubs, systolic 

murmur


GI/Abdominal exam: PRESENT: normal bowel sounds, soft.  ABSENT: distended, 

guarding, mass, organolmegaly, rebound, tenderness


Neurological exam: PRESENT: alert, awake, oriented to person, oriented to place,

oriented to time, oriented to situation, CN II-XII grossly intact.  ABSENT: 

motor sensory deficit


Psychiatric exam: PRESENT: normal mood





Results


Laboratory Results: 


                                        





                                 03/09/19 07:09 





                                 03/12/19 10:24 





                                        











  03/11/19 03/12/19





  15:10 10:24


 


Sodium   131.1 L


 


Potassium   2.7 L*


 


Chloride   98


 


Carbon Dioxide   24


 


Anion Gap   9


 


BUN   5 L


 


Creatinine   0.37 L


 


Est GFR ( Amer)   > 60


 


Est GFR (Non-Af Amer)   > 60


 


Glucose   125 H


 


Calcium   8.4


 


Urine Color  MAUDE 


 


Urine Appearance  SLIGHTLY-CLOUDY 


 


Urine pH  6.0 


 


Ur Specific Gravity  1.021 


 


Urine Protein  100 H 


 


Urine Glucose (UA)  NEGATIVE 


 


Urine Ketones  TRACE H 


 


Urine Blood  SMALL H 


 


Urine Nitrite  NEGATIVE 


 


Ur Leukocyte Esterase  TRACE H 


 


Urine WBC (Auto)  15 


 


Urine RBC (Auto)  15 








                                        











  03/08/19





  11:47


 


Troponin I  < 0.012











Impressions: 


                                        





Abdomen/Pelvis CT  03/08/19 11:42


IMPRESSION:  Fatty liver


Gallstones


Peripancreatic retroperitoneal inflammation, question pancreatitis


 








Cervical Spine CT  03/08/19 11:42


IMPRESSION:  CHRONIC DEGENERATIVE CHANGES.  NO ACUTE FINDINGS. Similar left 

thyroid nodule with calcifications.


 








Chest CT  03/08/19 11:42


IMPRESSION:  No acute changes


 








Head CT  03/08/19 11:42


IMPRESSION:  No acute intracranial findings.


EVIDENCE OF ACUTE STROKE: NO.


 














Assessment & Plan





- Diagnosis


(1) Clostridium difficile colitis


Is this a current diagnosis for this admission?: Yes   


Plan: 


Patient has been started on Flagyl and vancomycin.








(2) Hypokalemia


Is this a current diagnosis for this admission?: Yes   


Plan: 


I started her with ESTUARDO sultana








(3) Acute pancreatitis


Qualifiers: 


   Pancreatitis type: alcohol induced 


Is this a current diagnosis for this admission?: Yes   


Plan: 


Bowel rest aggressive hydration and pain control.








(4) Suicide attempt


Is this a current diagnosis for this admission?: Yes   


Plan: 


Patient denies she is suicidal.  Had been evaluated by psych and she is clipped.








(5) Alcoholism /alcohol abuse


Is this a current diagnosis for this admission?: Yes   


Plan: 


Patient advised to remain sober.

## 2019-03-13 LAB
ALBUMIN SERPL-MCNC: 3.3 G/DL (ref 3.5–5)
ALP SERPL-CCNC: 142 U/L (ref 38–126)
ALT SERPL-CCNC: 45 U/L (ref 9–52)
ANION GAP SERPL CALC-SCNC: 7 MMOL/L (ref 5–19)
AST SERPL-CCNC: 47 U/L (ref 14–36)
BILIRUB DIRECT SERPL-MCNC: 0.2 MG/DL (ref 0–0.4)
BILIRUB SERPL-MCNC: 0.7 MG/DL (ref 0.2–1.3)
BUN SERPL-MCNC: 5 MG/DL (ref 7–20)
CALCIUM: 8.7 MG/DL (ref 8.4–10.2)
CHLORIDE SERPL-SCNC: 101 MMOL/L (ref 98–107)
CO2 SERPL-SCNC: 25 MMOL/L (ref 22–30)
GLUCOSE SERPL-MCNC: 157 MG/DL (ref 75–110)
POTASSIUM SERPL-SCNC: 3.1 MMOL/L (ref 3.6–5)
PROT SERPL-MCNC: 6.2 G/DL (ref 6.3–8.2)
SODIUM SERPL-SCNC: 133.2 MMOL/L (ref 137–145)

## 2019-03-13 RX ADMIN — Medication SCH MG: at 05:18

## 2019-03-13 RX ADMIN — BENZTROPINE MESYLATE SCH MG: 1 TABLET ORAL at 10:38

## 2019-03-13 RX ADMIN — HYDROXYZINE PAMOATE SCH MG: 25 CAPSULE ORAL at 10:38

## 2019-03-13 RX ADMIN — Medication SCH MG: at 18:17

## 2019-03-13 RX ADMIN — Medication SCH MG: at 14:14

## 2019-03-13 RX ADMIN — Medication SCH ML: at 05:18

## 2019-03-13 RX ADMIN — LORAZEPAM PRN MG: 2 INJECTION INTRAMUSCULAR; INTRAVENOUS at 02:51

## 2019-03-13 RX ADMIN — METRONIDAZOLE SCH MG: 500 TABLET ORAL at 14:14

## 2019-03-13 RX ADMIN — OLANZAPINE SCH MG: 5 TABLET, FILM COATED ORAL at 18:17

## 2019-03-13 RX ADMIN — HYDROXYZINE PAMOATE SCH MG: 25 CAPSULE ORAL at 18:17

## 2019-03-13 RX ADMIN — QUETIAPINE FUMARATE SCH MG: 25 TABLET, FILM COATED ORAL at 21:26

## 2019-03-13 RX ADMIN — DULOXETINE SCH MG: 30 CAPSULE, DELAYED RELEASE ORAL at 10:38

## 2019-03-13 RX ADMIN — PROPRANOLOL HYDROCHLORIDE SCH MG: 20 TABLET ORAL at 10:38

## 2019-03-13 RX ADMIN — BUSPIRONE HYDROCHLORIDE SCH MG: 10 TABLET ORAL at 10:38

## 2019-03-13 RX ADMIN — LORAZEPAM PRN MG: 2 INJECTION INTRAMUSCULAR; INTRAVENOUS at 08:29

## 2019-03-13 RX ADMIN — HYDROXYZINE PAMOATE SCH MG: 25 CAPSULE ORAL at 21:24

## 2019-03-13 RX ADMIN — LORAZEPAM PRN MG: 2 INJECTION INTRAMUSCULAR; INTRAVENOUS at 21:26

## 2019-03-13 RX ADMIN — ROPINIROLE HYDROCHLORIDE SCH MG: 1 TABLET, FILM COATED ORAL at 21:24

## 2019-03-13 RX ADMIN — DULOXETINE SCH MG: 30 CAPSULE, DELAYED RELEASE ORAL at 18:16

## 2019-03-13 RX ADMIN — BUSPIRONE HYDROCHLORIDE SCH MG: 10 TABLET ORAL at 18:17

## 2019-03-13 RX ADMIN — HYDROXYZINE PAMOATE SCH MG: 25 CAPSULE ORAL at 14:17

## 2019-03-13 RX ADMIN — OLANZAPINE SCH MG: 5 TABLET, FILM COATED ORAL at 10:38

## 2019-03-13 RX ADMIN — Medication SCH MG: at 21:23

## 2019-03-13 RX ADMIN — HEPARIN SODIUM SCH: 5000 INJECTION, SOLUTION INTRAVENOUS; SUBCUTANEOUS at 05:15

## 2019-03-13 RX ADMIN — Medication SCH ML: at 21:25

## 2019-03-13 RX ADMIN — AMLODIPINE BESYLATE SCH MG: 10 TABLET ORAL at 10:38

## 2019-03-13 RX ADMIN — Medication SCH MG: at 14:17

## 2019-03-13 RX ADMIN — Medication SCH ML: at 14:14

## 2019-03-13 RX ADMIN — NICOTINE SCH EACH: 21 PATCH, EXTENDED RELEASE TOPICAL at 10:39

## 2019-03-13 RX ADMIN — METRONIDAZOLE SCH MG: 500 TABLET ORAL at 18:17

## 2019-03-13 RX ADMIN — NALBUPHINE HYDROCHLORIDE PRN MG: 10 INJECTION, SOLUTION INTRAMUSCULAR; INTRAVENOUS; SUBCUTANEOUS at 19:51

## 2019-03-13 RX ADMIN — HEPARIN SODIUM SCH: 5000 INJECTION, SOLUTION INTRAVENOUS; SUBCUTANEOUS at 21:22

## 2019-03-13 RX ADMIN — PROPRANOLOL HYDROCHLORIDE SCH MG: 20 TABLET ORAL at 21:23

## 2019-03-13 RX ADMIN — ACETAMINOPHEN PRN MG: 325 TABLET ORAL at 05:19

## 2019-03-13 RX ADMIN — HEPARIN SODIUM SCH: 5000 INJECTION, SOLUTION INTRAVENOUS; SUBCUTANEOUS at 14:14

## 2019-03-13 RX ADMIN — METRONIDAZOLE SCH MG: 500 TABLET ORAL at 05:18

## 2019-03-13 NOTE — PDOC PROGRESS REPORT
Subjective


Progress Note for:: 03/13/19


Subjective:: 


No new complaint.  Has diarrhea is subsiding.  Her hypokalemia also improving.





Reason For Visit: 


PANCREATITIS, ETOH INTOXICATION








Physical Exam


Vital Signs: 


                                        











Temp Pulse Resp BP Pulse Ox


 


 97.8 F   83   18   140/95 H  95 


 


 03/13/19 12:41  03/13/19 12:41  03/13/19 12:41  03/13/19 12:41  03/13/19 12:41








                                 Intake & Output











 03/12/19 03/13/19 03/14/19





 06:59 06:59 06:59


 


Intake Total 3268 2534 340


 


Output Total  550 500


 


Balance 3268 1984 -160


 


Weight 74.3 kg 76.3 kg 











General appearance: PRESENT: no acute distress


Head exam: PRESENT: atraumatic


Eye exam: PRESENT: conjunctiva pink


Mouth exam: PRESENT: moist


Neck exam: ABSENT: carotid bruit, JVD, lymphadenopathy, thyromegaly


Respiratory exam: PRESENT: clear to auscultation hill.  ABSENT: rales, rhonchi, 

wheezes


Cardiovascular exam: PRESENT: RRR.  ABSENT: diastolic murmur, rubs, systolic 

murmur


GI/Abdominal exam: PRESENT: normal bowel sounds, soft.  ABSENT: distended, 

guarding, mass, organolmegaly, rebound, tenderness


Neurological exam: PRESENT: alert, awake, oriented to time, oriented to 

situation.  ABSENT: motor sensory deficit





Results


Laboratory Results: 


                                        





                                 03/09/19 07:09 





                                 03/13/19 05:39 





                                        











  03/13/19





  05:39


 


Sodium  133.2 L


 


Potassium  3.1 L


 


Chloride  101


 


Carbon Dioxide  25


 


Anion Gap  7


 


BUN  5 L


 


Creatinine  0.42 L


 


Est GFR ( Amer)  > 60


 


Est GFR (Non-Af Amer)  > 60


 


Glucose  157 H


 


Calcium  8.7


 


Total Bilirubin  0.7


 


AST  47 H


 


ALT  45


 


Alkaline Phosphatase  142 H


 


Total Protein  6.2 L


 


Albumin  3.3 L








                                        











  03/08/19





  11:47


 


Troponin I  < 0.012











Impressions: 


                                        





Abdomen/Pelvis CT  03/08/19 11:42


IMPRESSION:  Fatty liver


Gallstones


Peripancreatic retroperitoneal inflammation, question pancreatitis


 








Cervical Spine CT  03/08/19 11:42


IMPRESSION:  CHRONIC DEGENERATIVE CHANGES.  NO ACUTE FINDINGS. Similar left 

thyroid nodule with calcifications.


 








Chest CT  03/08/19 11:42


IMPRESSION:  No acute changes


 








Head CT  03/08/19 11:42


IMPRESSION:  No acute intracranial findings.


EVIDENCE OF ACUTE STROKE: NO.


 














Assessment & Plan





- Diagnosis


(1) Elevated liver chemistry


Is this a current diagnosis for this admission?: Yes   


Plan: 


Most probably related to her pancreatitis.  Now it is trending down.








(2) Clostridium difficile colitis


Is this a current diagnosis for this admission?: Yes   


Plan: 


Patient has been started on Flagyl and vancomycin.








(3) Hypokalemia


Is this a current diagnosis for this admission?: Yes   


Plan: 


Improving








(4) Acute pancreatitis


Qualifiers: 


   Pancreatitis type: alcohol induced 


Is this a current diagnosis for this admission?: Yes   


Plan: 


Bowel rest aggressive hydration and pain control.








(5) Suicide attempt


Is this a current diagnosis for this admission?: Yes   


Plan: 


Patient denies she is suicidal.  Had been evaluated by psych and she is clipped.








(6) Alcoholism /alcohol abuse


Is this a current diagnosis for this admission?: Yes   


Plan: 


Patient advised to remain sober.

## 2019-03-14 LAB
ANION GAP SERPL CALC-SCNC: 10 MMOL/L (ref 5–19)
BUN SERPL-MCNC: 5 MG/DL (ref 7–20)
CALCIUM: 9.1 MG/DL (ref 8.4–10.2)
CHLORIDE SERPL-SCNC: 101 MMOL/L (ref 98–107)
CO2 SERPL-SCNC: 26 MMOL/L (ref 22–30)
GLUCOSE SERPL-MCNC: 153 MG/DL (ref 75–110)
LIPASE SERPL-CCNC: 611.7 U/L (ref 23–300)
POTASSIUM SERPL-SCNC: 3.5 MMOL/L (ref 3.6–5)
SODIUM SERPL-SCNC: 137.2 MMOL/L (ref 137–145)

## 2019-03-14 RX ADMIN — QUETIAPINE FUMARATE SCH MG: 25 TABLET, FILM COATED ORAL at 21:22

## 2019-03-14 RX ADMIN — Medication SCH MG: at 00:17

## 2019-03-14 RX ADMIN — METRONIDAZOLE SCH MG: 500 TABLET ORAL at 17:05

## 2019-03-14 RX ADMIN — METRONIDAZOLE SCH MG: 500 TABLET ORAL at 05:29

## 2019-03-14 RX ADMIN — NALBUPHINE HYDROCHLORIDE PRN MG: 10 INJECTION, SOLUTION INTRAMUSCULAR; INTRAVENOUS; SUBCUTANEOUS at 03:35

## 2019-03-14 RX ADMIN — LORAZEPAM PRN MG: 2 INJECTION INTRAMUSCULAR; INTRAVENOUS at 19:34

## 2019-03-14 RX ADMIN — NICOTINE SCH EACH: 21 PATCH, EXTENDED RELEASE TOPICAL at 09:44

## 2019-03-14 RX ADMIN — BUSPIRONE HYDROCHLORIDE SCH MG: 10 TABLET ORAL at 09:44

## 2019-03-14 RX ADMIN — METRONIDAZOLE SCH MG: 500 TABLET ORAL at 00:17

## 2019-03-14 RX ADMIN — NALBUPHINE HYDROCHLORIDE PRN MG: 10 INJECTION, SOLUTION INTRAMUSCULAR; INTRAVENOUS; SUBCUTANEOUS at 06:58

## 2019-03-14 RX ADMIN — OLANZAPINE SCH MG: 5 TABLET, FILM COATED ORAL at 09:43

## 2019-03-14 RX ADMIN — HYDROXYZINE PAMOATE SCH MG: 25 CAPSULE ORAL at 21:22

## 2019-03-14 RX ADMIN — PROPRANOLOL HYDROCHLORIDE SCH MG: 20 TABLET ORAL at 09:44

## 2019-03-14 RX ADMIN — OLANZAPINE SCH MG: 5 TABLET, FILM COATED ORAL at 17:02

## 2019-03-14 RX ADMIN — BUSPIRONE HYDROCHLORIDE SCH MG: 10 TABLET ORAL at 17:02

## 2019-03-14 RX ADMIN — NALBUPHINE HYDROCHLORIDE PRN MG: 10 INJECTION, SOLUTION INTRAMUSCULAR; INTRAVENOUS; SUBCUTANEOUS at 17:48

## 2019-03-14 RX ADMIN — BENZTROPINE MESYLATE SCH MG: 1 TABLET ORAL at 09:44

## 2019-03-14 RX ADMIN — ROPINIROLE HYDROCHLORIDE SCH MG: 1 TABLET, FILM COATED ORAL at 21:22

## 2019-03-14 RX ADMIN — HYDROXYZINE PAMOATE SCH MG: 25 CAPSULE ORAL at 17:02

## 2019-03-14 RX ADMIN — Medication SCH MG: at 05:27

## 2019-03-14 RX ADMIN — Medication SCH MG: at 11:51

## 2019-03-14 RX ADMIN — ACETAMINOPHEN PRN MG: 325 TABLET ORAL at 15:30

## 2019-03-14 RX ADMIN — DULOXETINE SCH MG: 30 CAPSULE, DELAYED RELEASE ORAL at 17:02

## 2019-03-14 RX ADMIN — HEPARIN SODIUM SCH: 5000 INJECTION, SOLUTION INTRAVENOUS; SUBCUTANEOUS at 21:22

## 2019-03-14 RX ADMIN — HEPARIN SODIUM SCH: 5000 INJECTION, SOLUTION INTRAVENOUS; SUBCUTANEOUS at 13:20

## 2019-03-14 RX ADMIN — Medication SCH MG: at 17:01

## 2019-03-14 RX ADMIN — HEPARIN SODIUM SCH: 5000 INJECTION, SOLUTION INTRAVENOUS; SUBCUTANEOUS at 05:30

## 2019-03-14 RX ADMIN — Medication SCH ML: at 21:23

## 2019-03-14 RX ADMIN — NALBUPHINE HYDROCHLORIDE PRN MG: 10 INJECTION, SOLUTION INTRAMUSCULAR; INTRAVENOUS; SUBCUTANEOUS at 21:24

## 2019-03-14 RX ADMIN — Medication SCH MG: at 05:29

## 2019-03-14 RX ADMIN — Medication SCH ML: at 13:30

## 2019-03-14 RX ADMIN — AMLODIPINE BESYLATE SCH MG: 10 TABLET ORAL at 09:43

## 2019-03-14 RX ADMIN — Medication SCH ML: at 05:29

## 2019-03-14 RX ADMIN — HYDROXYZINE PAMOATE SCH MG: 25 CAPSULE ORAL at 13:21

## 2019-03-14 RX ADMIN — LORAZEPAM PRN MG: 2 INJECTION INTRAMUSCULAR; INTRAVENOUS at 17:48

## 2019-03-14 RX ADMIN — DULOXETINE SCH MG: 30 CAPSULE, DELAYED RELEASE ORAL at 09:44

## 2019-03-14 RX ADMIN — LORAZEPAM PRN MG: 2 INJECTION INTRAMUSCULAR; INTRAVENOUS at 07:11

## 2019-03-14 RX ADMIN — METRONIDAZOLE SCH MG: 500 TABLET ORAL at 11:52

## 2019-03-14 RX ADMIN — PROPRANOLOL HYDROCHLORIDE SCH MG: 20 TABLET ORAL at 21:22

## 2019-03-14 RX ADMIN — LORAZEPAM PRN MG: 2 INJECTION INTRAMUSCULAR; INTRAVENOUS at 15:31

## 2019-03-14 RX ADMIN — Medication SCH MG: at 21:36

## 2019-03-14 RX ADMIN — Medication SCH MG: at 13:21

## 2019-03-14 RX ADMIN — HYDROXYZINE PAMOATE SCH MG: 25 CAPSULE ORAL at 09:44

## 2019-03-14 NOTE — PDOC PROGRESS REPORT
Subjective


Progress Note for:: 03/14/19


Subjective:: 


Seen patient sitting up in bed enjoying her breakfast.  The frequency" 

assistance of her diarrhea has markedly improved.  Since patient is 

deconditioned physical therapy consulted and will follow their recommendation.





Reason For Visit: 


PANCREATITIS, ETOH INTOXICATION








Physical Exam


Vital Signs: 


                                        











Temp Pulse Resp BP Pulse Ox


 


 97.7 F   79   16   136/83 H  96 


 


 03/14/19 08:14  03/14/19 08:14  03/14/19 08:14  03/14/19 08:14  03/14/19 08:14








                                 Intake & Output











 03/13/19 03/14/19 03/15/19





 06:59 06:59 06:59


 


Intake Total 2534 577 


 


Output Total 550 1950 


 


Balance 1984 -1373 


 


Weight 76.3 kg 73.7 kg 











General appearance: PRESENT: no acute distress, well-developed, well-nourished


Head exam: PRESENT: atraumatic, normocephalic


Eye exam: PRESENT: conjunctiva pink, EOMI, PERRLA.  ABSENT: scleral icterus


Ear exam: PRESENT: normal external ear exam


Mouth exam: PRESENT: moist, tongue midline


Neck exam: ABSENT: carotid bruit, JVD, lymphadenopathy, thyromegaly


Respiratory exam: PRESENT: clear to auscultation hill.  ABSENT: rales, rhonchi, 

wheezes


Cardiovascular exam: PRESENT: RRR.  ABSENT: diastolic murmur, rubs, systolic 

murmur


Pulses: PRESENT: normal dorsalis pedis pul


Vascular exam: PRESENT: normal capillary refill


GI/Abdominal exam: PRESENT: normal bowel sounds, soft.  ABSENT: distended, 

guarding, mass, organolmegaly, rebound, tenderness


Rectal exam: PRESENT: deferred


Extremities exam: PRESENT: full ROM.  ABSENT: calf tenderness, clubbing, pedal 

edema


Neurological exam: PRESENT: alert, awake, oriented to person, oriented to place,

oriented to time, oriented to situation, CN II-XII grossly intact.  ABSENT: 

motor sensory deficit


Psychiatric exam: PRESENT: appropriate affect, normal mood.  ABSENT: homicidal 

ideation, suicidal ideation


Skin exam: PRESENT: dry, intact, warm.  ABSENT: cyanosis, rash





Results


Laboratory Results: 


                                        





                                 03/09/19 07:09 





                                 03/14/19 06:12 





                                        











  03/14/19





  06:12


 


Sodium  137.2


 


Potassium  3.5 L


 


Chloride  101


 


Carbon Dioxide  26


 


Anion Gap  10


 


BUN  5 L


 


Creatinine  0.45 L


 


Est GFR ( Amer)  > 60


 


Est GFR (Non-Af Amer)  > 60


 


Glucose  153 H


 


Calcium  9.1


 


Lipase  611.7 H








                                        





03/11/19 12:30   Stool - Stool    - Final





                                        











  03/08/19





  11:47


 


Troponin I  < 0.012











Impressions: 


                                        





Abdomen/Pelvis CT  03/08/19 11:42


IMPRESSION:  Fatty liver


Gallstones


Peripancreatic retroperitoneal inflammation, question pancreatitis


 








Cervical Spine CT  03/08/19 11:42


IMPRESSION:  CHRONIC DEGENERATIVE CHANGES.  NO ACUTE FINDINGS. Similar left 

thyroid nodule with calcifications.


 








Chest CT  03/08/19 11:42


IMPRESSION:  No acute changes


 








Head CT  03/08/19 11:42


IMPRESSION:  No acute intracranial findings.


EVIDENCE OF ACUTE STROKE: NO.


 














Assessment & Plan





- Diagnosis


(1) Elevated liver chemistry


Is this a current diagnosis for this admission?: Yes   


Plan: 


Most probably related to her pancreatitis.  Now it is trending down.








(2) Clostridium difficile colitis


Is this a current diagnosis for this admission?: Yes   


Plan: 


Patient has been started on Flagyl and vancomycin.








(3) Hypokalemia


Is this a current diagnosis for this admission?: Yes   


Plan: 


Improving








(4) Acute pancreatitis


Qualifiers: 


   Pancreatitis type: alcohol induced 


Is this a current diagnosis for this admission?: Yes   


Plan: 


Bowel rest aggressive hydration and pain control.








(5) Suicide attempt


Is this a current diagnosis for this admission?: Yes   


Plan: 


Patient denies she is suicidal.  Had been evaluated by psych and she is clipped.








(6) Alcoholism /alcohol abuse


Is this a current diagnosis for this admission?: Yes   


Plan: 


Patient advised to remain sober.

## 2019-03-15 RX ADMIN — NICOTINE SCH EACH: 21 PATCH, EXTENDED RELEASE TOPICAL at 10:25

## 2019-03-15 RX ADMIN — DULOXETINE SCH MG: 30 CAPSULE, DELAYED RELEASE ORAL at 17:17

## 2019-03-15 RX ADMIN — METRONIDAZOLE SCH MG: 500 TABLET ORAL at 23:12

## 2019-03-15 RX ADMIN — HEPARIN SODIUM SCH: 5000 INJECTION, SOLUTION INTRAVENOUS; SUBCUTANEOUS at 05:08

## 2019-03-15 RX ADMIN — LORAZEPAM PRN MG: 2 INJECTION INTRAMUSCULAR; INTRAVENOUS at 00:03

## 2019-03-15 RX ADMIN — HYDROXYZINE PAMOATE SCH MG: 25 CAPSULE ORAL at 10:25

## 2019-03-15 RX ADMIN — METRONIDAZOLE SCH MG: 500 TABLET ORAL at 05:03

## 2019-03-15 RX ADMIN — DULOXETINE SCH MG: 30 CAPSULE, DELAYED RELEASE ORAL at 10:25

## 2019-03-15 RX ADMIN — METRONIDAZOLE SCH MG: 500 TABLET ORAL at 17:16

## 2019-03-15 RX ADMIN — ROPINIROLE HYDROCHLORIDE SCH MG: 1 TABLET, FILM COATED ORAL at 21:19

## 2019-03-15 RX ADMIN — HEPARIN SODIUM SCH: 5000 INJECTION, SOLUTION INTRAVENOUS; SUBCUTANEOUS at 14:03

## 2019-03-15 RX ADMIN — DIAZEPAM SCH: 5 TABLET ORAL at 14:03

## 2019-03-15 RX ADMIN — Medication SCH MG: at 00:05

## 2019-03-15 RX ADMIN — AMLODIPINE BESYLATE SCH MG: 10 TABLET ORAL at 10:25

## 2019-03-15 RX ADMIN — HYDROXYZINE PAMOATE SCH MG: 25 CAPSULE ORAL at 21:19

## 2019-03-15 RX ADMIN — DIAZEPAM SCH MG: 5 TABLET ORAL at 21:19

## 2019-03-15 RX ADMIN — HEPARIN SODIUM SCH: 5000 INJECTION, SOLUTION INTRAVENOUS; SUBCUTANEOUS at 21:26

## 2019-03-15 RX ADMIN — Medication SCH MG: at 05:01

## 2019-03-15 RX ADMIN — HYDROXYZINE PAMOATE SCH MG: 25 CAPSULE ORAL at 17:17

## 2019-03-15 RX ADMIN — Medication SCH MG: at 14:02

## 2019-03-15 RX ADMIN — BUSPIRONE HYDROCHLORIDE SCH MG: 10 TABLET ORAL at 10:25

## 2019-03-15 RX ADMIN — OLANZAPINE SCH MG: 5 TABLET, FILM COATED ORAL at 10:25

## 2019-03-15 RX ADMIN — NALBUPHINE HYDROCHLORIDE PRN MG: 10 INJECTION, SOLUTION INTRAMUSCULAR; INTRAVENOUS; SUBCUTANEOUS at 10:24

## 2019-03-15 RX ADMIN — LORAZEPAM PRN MG: 2 INJECTION INTRAMUSCULAR; INTRAVENOUS at 04:49

## 2019-03-15 RX ADMIN — Medication SCH MG: at 23:12

## 2019-03-15 RX ADMIN — METRONIDAZOLE SCH MG: 500 TABLET ORAL at 11:27

## 2019-03-15 RX ADMIN — Medication SCH MG: at 11:27

## 2019-03-15 RX ADMIN — Medication SCH ML: at 14:03

## 2019-03-15 RX ADMIN — Medication SCH MG: at 05:09

## 2019-03-15 RX ADMIN — METRONIDAZOLE SCH MG: 500 TABLET ORAL at 00:05

## 2019-03-15 RX ADMIN — Medication SCH ML: at 21:26

## 2019-03-15 RX ADMIN — PROPRANOLOL HYDROCHLORIDE SCH MG: 20 TABLET ORAL at 10:25

## 2019-03-15 RX ADMIN — Medication SCH ML: at 05:01

## 2019-03-15 RX ADMIN — Medication SCH MG: at 17:17

## 2019-03-15 RX ADMIN — QUETIAPINE FUMARATE SCH MG: 25 TABLET, FILM COATED ORAL at 21:23

## 2019-03-15 RX ADMIN — PROPRANOLOL HYDROCHLORIDE SCH MG: 20 TABLET ORAL at 21:19

## 2019-03-15 RX ADMIN — Medication SCH MG: at 21:23

## 2019-03-15 RX ADMIN — LORAZEPAM PRN MG: 2 INJECTION INTRAMUSCULAR; INTRAVENOUS at 10:24

## 2019-03-15 RX ADMIN — BUSPIRONE HYDROCHLORIDE SCH MG: 10 TABLET ORAL at 17:17

## 2019-03-15 RX ADMIN — OLANZAPINE SCH MG: 5 TABLET, FILM COATED ORAL at 17:17

## 2019-03-15 RX ADMIN — LORAZEPAM PRN MG: 2 INJECTION INTRAMUSCULAR; INTRAVENOUS at 22:41

## 2019-03-15 RX ADMIN — HYDROXYZINE PAMOATE SCH MG: 25 CAPSULE ORAL at 14:02

## 2019-03-15 RX ADMIN — DIAZEPAM SCH MG: 5 TABLET ORAL at 11:27

## 2019-03-15 RX ADMIN — BENZTROPINE MESYLATE SCH MG: 1 TABLET ORAL at 10:25

## 2019-03-15 NOTE — PDOC PROGRESS REPORT
Subjective


Progress Note for:: 03/15/19


Subjective:: 


I seen patient resting in bed.  Reportedly she had an episode of hallucination 

agitation and reportedly patient is awake alert but oriented to self only.  

Physical therapy recommended SNF placement at





Reason For Visit: 


PANCREATITIS, ETOH INTOXICATION








Physical Exam


Vital Signs: 


                                        











Temp Pulse Resp BP Pulse Ox


 


 97.5 F   97   18   149/77 H  97 


 


 03/15/19 11:17  03/15/19 11:17  03/15/19 11:17  03/15/19 11:17  03/15/19 11:17








                                 Intake & Output











 03/14/19 03/15/19 03/16/19





 06:59 06:59 06:59


 


Intake Total 577 1700 360


 


Output Total 1950 1200 0


 


Balance -1373 500 360


 


Weight 73.7 kg 74.7 kg 











General appearance: PRESENT: no acute distress


Eye exam: PRESENT: conjunctiva pink


Respiratory exam: PRESENT: clear to auscultation hill.  ABSENT: rales, rhonchi, 

wheezes


Cardiovascular exam: PRESENT: RRR.  ABSENT: diastolic murmur, rubs, systolic 

murmur


GI/Abdominal exam: PRESENT: normal bowel sounds, soft.  ABSENT: distended, 

guarding, mass, organolmegaly, rebound, tenderness


Neurological exam: PRESENT: alert, awake





Results


Laboratory Results: 


                                        





                                 03/09/19 07:09 





                                 03/14/19 06:12 





                                        





03/10/19 12:02   Blood   Blood Culture - Final


                            NO GROWTH IN 5 DAYS


03/11/19 12:30   Stool - Stool    - Final


03/11/19 12:30   Stool - Stool   Stool Culture - Final


                            NO SALMONELLA, SHIGELLA, CAMPYLOBACTER, OR E.COLI 

0157


                            RECOVERED.


                            NEGATIVE FOR SHIGA TOXINS 1&2.





                                        











  03/08/19





  11:47


 


Troponin I  < 0.012











Impressions: 


                                        





Abdomen/Pelvis CT  03/08/19 11:42


IMPRESSION:  Fatty liver


Gallstones


Peripancreatic retroperitoneal inflammation, question pancreatitis


 








Cervical Spine CT  03/08/19 11:42


IMPRESSION:  CHRONIC DEGENERATIVE CHANGES.  NO ACUTE FINDINGS. Similar left 

thyroid nodule with calcifications.


 








Chest CT  03/08/19 11:42


IMPRESSION:  No acute changes


 








Head CT  03/08/19 11:42


IMPRESSION:  No acute intracranial findings.


EVIDENCE OF ACUTE STROKE: NO.


 














Assessment & Plan





- Diagnosis


(1) Elevated liver chemistry


Is this a current diagnosis for this admission?: Yes   


Plan: 


Trended down markedly








(2) Clostridium difficile colitis


Is this a current diagnosis for this admission?: Yes   


Plan: 


Patient has been started on Flagyl and vancomycin.








(3) Hypokalemia


Is this a current diagnosis for this admission?: Yes   


Plan: 


Improving








(4) Acute pancreatitis


Qualifiers: 


   Pancreatitis type: alcohol induced 


Is this a current diagnosis for this admission?: Yes   


Plan: 


Her lipase trended down markedly








(5) Suicide attempt


Is this a current diagnosis for this admission?: Yes   


Plan: 


Patient denies she is suicidal.  Had been evaluated by psych and she is clipped.








(6) Alcoholism /alcohol abuse


Is this a current diagnosis for this admission?: Yes   


Plan: 


Patient advised to remain sober.

## 2019-03-16 RX ADMIN — BENZTROPINE MESYLATE SCH MG: 1 TABLET ORAL at 09:41

## 2019-03-16 RX ADMIN — Medication SCH ML: at 06:02

## 2019-03-16 RX ADMIN — LORAZEPAM PRN MG: 2 INJECTION INTRAMUSCULAR; INTRAVENOUS at 08:41

## 2019-03-16 RX ADMIN — HYDROXYZINE PAMOATE SCH MG: 25 CAPSULE ORAL at 18:26

## 2019-03-16 RX ADMIN — PROPRANOLOL HYDROCHLORIDE SCH MG: 20 TABLET ORAL at 21:38

## 2019-03-16 RX ADMIN — HYDROXYZINE PAMOATE SCH MG: 25 CAPSULE ORAL at 13:57

## 2019-03-16 RX ADMIN — HYDROXYZINE PAMOATE SCH MG: 25 CAPSULE ORAL at 21:41

## 2019-03-16 RX ADMIN — NALBUPHINE HYDROCHLORIDE PRN MG: 10 INJECTION, SOLUTION INTRAMUSCULAR; INTRAVENOUS; SUBCUTANEOUS at 11:11

## 2019-03-16 RX ADMIN — PROPRANOLOL HYDROCHLORIDE SCH MG: 20 TABLET ORAL at 09:41

## 2019-03-16 RX ADMIN — Medication SCH MG: at 06:01

## 2019-03-16 RX ADMIN — DIAZEPAM SCH MG: 5 TABLET ORAL at 18:26

## 2019-03-16 RX ADMIN — AMLODIPINE BESYLATE SCH MG: 10 TABLET ORAL at 09:41

## 2019-03-16 RX ADMIN — Medication SCH MG: at 11:15

## 2019-03-16 RX ADMIN — METRONIDAZOLE SCH MG: 500 TABLET ORAL at 11:15

## 2019-03-16 RX ADMIN — Medication SCH MG: at 21:41

## 2019-03-16 RX ADMIN — HEPARIN SODIUM SCH: 5000 INJECTION, SOLUTION INTRAVENOUS; SUBCUTANEOUS at 13:53

## 2019-03-16 RX ADMIN — LORAZEPAM PRN MG: 2 INJECTION INTRAMUSCULAR; INTRAVENOUS at 14:35

## 2019-03-16 RX ADMIN — Medication SCH MG: at 18:27

## 2019-03-16 RX ADMIN — METRONIDAZOLE SCH MG: 500 TABLET ORAL at 06:01

## 2019-03-16 RX ADMIN — METRONIDAZOLE SCH MG: 500 TABLET ORAL at 18:26

## 2019-03-16 RX ADMIN — Medication SCH MG: at 06:02

## 2019-03-16 RX ADMIN — DIAZEPAM SCH MG: 5 TABLET ORAL at 11:15

## 2019-03-16 RX ADMIN — DIAZEPAM SCH MG: 5 TABLET ORAL at 06:00

## 2019-03-16 RX ADMIN — HEPARIN SODIUM SCH: 5000 INJECTION, SOLUTION INTRAVENOUS; SUBCUTANEOUS at 21:41

## 2019-03-16 RX ADMIN — OLANZAPINE SCH MG: 5 TABLET, FILM COATED ORAL at 09:41

## 2019-03-16 RX ADMIN — DULOXETINE SCH MG: 30 CAPSULE, DELAYED RELEASE ORAL at 09:41

## 2019-03-16 RX ADMIN — OLANZAPINE SCH MG: 5 TABLET, FILM COATED ORAL at 18:26

## 2019-03-16 RX ADMIN — HYDROXYZINE PAMOATE SCH MG: 25 CAPSULE ORAL at 09:41

## 2019-03-16 RX ADMIN — NICOTINE SCH EACH: 21 PATCH, EXTENDED RELEASE TOPICAL at 09:40

## 2019-03-16 RX ADMIN — DULOXETINE SCH MG: 30 CAPSULE, DELAYED RELEASE ORAL at 18:26

## 2019-03-16 RX ADMIN — HEPARIN SODIUM SCH: 5000 INJECTION, SOLUTION INTRAVENOUS; SUBCUTANEOUS at 06:02

## 2019-03-16 RX ADMIN — Medication SCH ML: at 13:57

## 2019-03-16 RX ADMIN — Medication SCH ML: at 21:41

## 2019-03-16 RX ADMIN — BUSPIRONE HYDROCHLORIDE SCH MG: 10 TABLET ORAL at 09:41

## 2019-03-16 RX ADMIN — BUSPIRONE HYDROCHLORIDE SCH MG: 10 TABLET ORAL at 18:26

## 2019-03-16 RX ADMIN — QUETIAPINE FUMARATE SCH MG: 25 TABLET, FILM COATED ORAL at 21:41

## 2019-03-16 RX ADMIN — LORAZEPAM PRN MG: 2 INJECTION INTRAMUSCULAR; INTRAVENOUS at 00:38

## 2019-03-16 RX ADMIN — ROPINIROLE HYDROCHLORIDE SCH MG: 1 TABLET, FILM COATED ORAL at 21:40

## 2019-03-16 RX ADMIN — Medication SCH MG: at 13:57

## 2019-03-16 NOTE — PDOC PROGRESS REPORT
Subjective


Progress Note for:: 03/16/19


Subjective:: 


I seen patient sleeping quietly.  Reportedly patient still confused.  Her 

diarrhea has subsided.  PT recommended skilled nursing facility placement.





Reason For Visit: 


PANCREATITIS, ETOH INTOXICATION








Physical Exam


Vital Signs: 


                                        











Temp Pulse Resp BP Pulse Ox


 


 98.0 F   83   16   153/75 H  99 


 


 03/16/19 07:42  03/16/19 07:42  03/16/19 04:07  03/16/19 07:42  03/16/19 07:42








                                 Intake & Output











 03/15/19 03/16/19 03/17/19





 06:59 06:59 06:59


 


Intake Total 1700 1060 


 


Output Total 1200 1550 


 


Balance 500 -490 


 


Weight 74.7 kg 68.1 kg 











General appearance: PRESENT: no acute distress


Eye exam: PRESENT: conjunctiva pink


Neck exam: ABSENT: carotid bruit, JVD, lymphadenopathy, thyromegaly


Respiratory exam: PRESENT: clear to auscultation hill.  ABSENT: rales, rhonchi, 

wheezes


Cardiovascular exam: PRESENT: RRR.  ABSENT: diastolic murmur, rubs, systolic 

murmur


GI/Abdominal exam: PRESENT: normal bowel sounds, soft.  ABSENT: distended, 

guarding, mass, organolmegaly, rebound, tenderness





Results


Laboratory Results: 


                                        





                                 03/09/19 07:09 





                                 03/14/19 06:12 





                                        





03/10/19 13:25   Blood   Blood Culture - Final


                            NO GROWTH IN 5 DAYS


03/10/19 12:02   Blood   Blood Culture - Final


                            NO GROWTH IN 5 DAYS





                                        











  03/08/19





  11:47


 


Troponin I  < 0.012











Impressions: 


                                        





Abdomen/Pelvis CT  03/08/19 11:42


IMPRESSION:  Fatty liver


Gallstones


Peripancreatic retroperitoneal inflammation, question pancreatitis


 








Cervical Spine CT  03/08/19 11:42


IMPRESSION:  CHRONIC DEGENERATIVE CHANGES.  NO ACUTE FINDINGS. Similar left 

thyroid nodule with calcifications.


 








Chest CT  03/08/19 11:42


IMPRESSION:  No acute changes


 








Head CT  03/08/19 11:42


IMPRESSION:  No acute intracranial findings.


EVIDENCE OF ACUTE STROKE: NO.


 














Assessment & Plan





- Diagnosis


(1) Elevated liver chemistry


Is this a current diagnosis for this admission?: Yes   


Plan: 


Improved








(2) Clostridium difficile colitis


Is this a current diagnosis for this admission?: Yes   


Plan: 


Patient has been started on Flagyl and vancomycin.








(3) Hypokalemia


Is this a current diagnosis for this admission?: Yes   


Plan: 


Improving








(4) Acute pancreatitis


Qualifiers: 


   Pancreatitis type: alcohol induced 


Is this a current diagnosis for this admission?: Yes   


Plan: 


Her lipase trended down markedly








(5) Suicide attempt


Is this a current diagnosis for this admission?: Yes   


Plan: 


Patient denies she is suicidal.  Had been evaluated by psych and she is clipped.








(6) Alcoholism /alcohol abuse


Is this a current diagnosis for this admission?: Yes   


Plan: 


Patient advised to remain sober.

## 2019-03-17 LAB
ANION GAP SERPL CALC-SCNC: 9 MMOL/L (ref 5–19)
BUN SERPL-MCNC: 5 MG/DL (ref 7–20)
CALCIUM: 9.6 MG/DL (ref 8.4–10.2)
CHLORIDE SERPL-SCNC: 98 MMOL/L (ref 98–107)
CO2 SERPL-SCNC: 32 MMOL/L (ref 22–30)
GLUCOSE SERPL-MCNC: 137 MG/DL (ref 75–110)
LIPASE SERPL-CCNC: 457.8 U/L (ref 23–300)
POTASSIUM SERPL-SCNC: 3.6 MMOL/L (ref 3.6–5)
SODIUM SERPL-SCNC: 138.8 MMOL/L (ref 137–145)

## 2019-03-17 RX ADMIN — METRONIDAZOLE SCH: 500 TABLET ORAL at 23:06

## 2019-03-17 RX ADMIN — DULOXETINE SCH MG: 30 CAPSULE, DELAYED RELEASE ORAL at 18:30

## 2019-03-17 RX ADMIN — DIAZEPAM SCH MG: 5 TABLET ORAL at 18:31

## 2019-03-17 RX ADMIN — NICOTINE SCH EACH: 21 PATCH, EXTENDED RELEASE TOPICAL at 09:50

## 2019-03-17 RX ADMIN — GABAPENTIN SCH MG: 400 CAPSULE ORAL at 22:19

## 2019-03-17 RX ADMIN — Medication SCH MG: at 18:30

## 2019-03-17 RX ADMIN — METRONIDAZOLE SCH MG: 500 TABLET ORAL at 18:30

## 2019-03-17 RX ADMIN — Medication SCH MG: at 00:11

## 2019-03-17 RX ADMIN — ACETAMINOPHEN PRN MG: 325 TABLET ORAL at 03:11

## 2019-03-17 RX ADMIN — HYDROXYZINE PAMOATE SCH MG: 25 CAPSULE ORAL at 22:19

## 2019-03-17 RX ADMIN — Medication SCH: at 14:59

## 2019-03-17 RX ADMIN — Medication SCH: at 23:06

## 2019-03-17 RX ADMIN — Medication SCH ML: at 05:06

## 2019-03-17 RX ADMIN — ROPINIROLE HYDROCHLORIDE SCH MG: 1 TABLET, FILM COATED ORAL at 22:20

## 2019-03-17 RX ADMIN — Medication SCH MG: at 05:06

## 2019-03-17 RX ADMIN — BENZTROPINE MESYLATE SCH MG: 1 TABLET ORAL at 09:49

## 2019-03-17 RX ADMIN — DIAZEPAM SCH MG: 5 TABLET ORAL at 05:06

## 2019-03-17 RX ADMIN — DULOXETINE SCH MG: 30 CAPSULE, DELAYED RELEASE ORAL at 09:49

## 2019-03-17 RX ADMIN — Medication SCH MG: at 22:24

## 2019-03-17 RX ADMIN — ROPINIROLE HYDROCHLORIDE SCH MG: 1 TABLET, FILM COATED ORAL at 22:19

## 2019-03-17 RX ADMIN — LORAZEPAM PRN MG: 2 INJECTION INTRAMUSCULAR; INTRAVENOUS at 08:26

## 2019-03-17 RX ADMIN — HYDROXYZINE PAMOATE SCH MG: 25 CAPSULE ORAL at 18:31

## 2019-03-17 RX ADMIN — GABAPENTIN SCH: 400 CAPSULE ORAL at 14:59

## 2019-03-17 RX ADMIN — DIAZEPAM SCH MG: 5 TABLET ORAL at 00:12

## 2019-03-17 RX ADMIN — HEPARIN SODIUM SCH: 5000 INJECTION, SOLUTION INTRAVENOUS; SUBCUTANEOUS at 22:10

## 2019-03-17 RX ADMIN — PROPRANOLOL HYDROCHLORIDE SCH MG: 20 TABLET ORAL at 09:50

## 2019-03-17 RX ADMIN — HYDROXYZINE PAMOATE SCH MG: 25 CAPSULE ORAL at 09:49

## 2019-03-17 RX ADMIN — OLANZAPINE SCH MG: 5 TABLET, FILM COATED ORAL at 18:31

## 2019-03-17 RX ADMIN — METRONIDAZOLE SCH MG: 500 TABLET ORAL at 11:16

## 2019-03-17 RX ADMIN — HYDROXYZINE PAMOATE SCH: 25 CAPSULE ORAL at 14:59

## 2019-03-17 RX ADMIN — GABAPENTIN SCH MG: 400 CAPSULE ORAL at 09:51

## 2019-03-17 RX ADMIN — PROPRANOLOL HYDROCHLORIDE SCH MG: 20 TABLET ORAL at 22:19

## 2019-03-17 RX ADMIN — NALBUPHINE HYDROCHLORIDE PRN MG: 10 INJECTION, SOLUTION INTRAMUSCULAR; INTRAVENOUS; SUBCUTANEOUS at 11:16

## 2019-03-17 RX ADMIN — OLANZAPINE SCH MG: 5 TABLET, FILM COATED ORAL at 09:49

## 2019-03-17 RX ADMIN — Medication SCH ML: at 22:20

## 2019-03-17 RX ADMIN — HEPARIN SODIUM SCH: 5000 INJECTION, SOLUTION INTRAVENOUS; SUBCUTANEOUS at 05:07

## 2019-03-17 RX ADMIN — METRONIDAZOLE SCH MG: 500 TABLET ORAL at 00:12

## 2019-03-17 RX ADMIN — BUSPIRONE HYDROCHLORIDE SCH MG: 10 TABLET ORAL at 09:49

## 2019-03-17 RX ADMIN — DIAZEPAM SCH MG: 5 TABLET ORAL at 11:25

## 2019-03-17 RX ADMIN — DIAZEPAM SCH: 5 TABLET ORAL at 23:05

## 2019-03-17 RX ADMIN — HEPARIN SODIUM SCH: 5000 INJECTION, SOLUTION INTRAVENOUS; SUBCUTANEOUS at 13:31

## 2019-03-17 RX ADMIN — QUETIAPINE FUMARATE SCH MG: 25 TABLET, FILM COATED ORAL at 22:23

## 2019-03-17 RX ADMIN — AMLODIPINE BESYLATE SCH MG: 10 TABLET ORAL at 09:49

## 2019-03-17 RX ADMIN — METRONIDAZOLE SCH MG: 500 TABLET ORAL at 05:06

## 2019-03-17 RX ADMIN — BUSPIRONE HYDROCHLORIDE SCH MG: 10 TABLET ORAL at 18:30

## 2019-03-17 RX ADMIN — Medication SCH MG: at 11:16

## 2019-03-17 NOTE — PDOC PROGRESS REPORT
Subjective


Progress Note for:: 03/17/19


Subjective:: 


Patient seen awake alert and conversant.  She complains of restless leg syndrome

and she requested to increase the frequency of her Requip from once a day to 

twice a day.  Her diarrhea has subsided.  Patient is severely deconditioned.  

She may benefit short-term acute rehab.  Physical therapy recommended SNF 

placement


Reason For Visit: 


PANCREATITIS, ETOH INTOXICATION








Physical Exam


Vital Signs: 


                                        











Temp Pulse Resp BP Pulse Ox


 


 99.0 F   79   14   137/74 H  97 


 


 03/17/19 07:17  03/17/19 07:17  03/17/19 07:17  03/17/19 07:17  03/17/19 07:17








                                 Intake & Output











 03/16/19 03/17/19 03/18/19





 06:59 06:59 06:59


 


Intake Total 1060 1000 


 


Output Total 1550 600 


 


Balance -490 400 


 


Weight 68.1 kg 67.5 kg 











General appearance: PRESENT: no acute distress


Eye exam: PRESENT: conjunctiva pink


Mouth exam: PRESENT: moist


Neck exam: ABSENT: carotid bruit, JVD, lymphadenopathy, thyromegaly


Respiratory exam: PRESENT: clear to auscultation hill.  ABSENT: rales, rhonchi, 

wheezes


Cardiovascular exam: PRESENT: RRR.  ABSENT: diastolic murmur, rubs, systolic 

murmur


GI/Abdominal exam: PRESENT: normal bowel sounds, soft.  ABSENT: distended, 

guarding, mass, organolmegaly, rebound, tenderness


Neurological exam: PRESENT: alert, awake





Results


Laboratory Results: 


                                        





                                 03/09/19 07:09 





                                 03/17/19 05:07 





                                        











  03/17/19





  05:07


 


Sodium  138.8


 


Potassium  3.6


 


Chloride  98


 


Carbon Dioxide  32 H


 


Anion Gap  9


 


BUN  5 L


 


Creatinine  0.51 L


 


Est GFR ( Amer)  > 60


 


Est GFR (Non-Af Amer)  > 60


 


Glucose  137 H


 


Calcium  9.6


 


Lipase  457.8 H








                                        











  03/08/19





  11:47


 


Troponin I  < 0.012











Impressions: 


                                        





Abdomen/Pelvis CT  03/08/19 11:42


IMPRESSION:  Fatty liver


Gallstones


Peripancreatic retroperitoneal inflammation, question pancreatitis


 








Cervical Spine CT  03/08/19 11:42


IMPRESSION:  CHRONIC DEGENERATIVE CHANGES.  NO ACUTE FINDINGS. Similar left thy

roid nodule with calcifications.


 








Chest CT  03/08/19 11:42


IMPRESSION:  No acute changes


 








Head CT  03/08/19 11:42


IMPRESSION:  No acute intracranial findings.


EVIDENCE OF ACUTE STROKE: NO.


 














Assessment & Plan





- Diagnosis


(1) Elevated liver chemistry


Is this a current diagnosis for this admission?: Yes   


Plan: 


Resolved








(2) Clostridium difficile colitis


Is this a current diagnosis for this admission?: Yes   


Plan: 


Improving








(3) Hypokalemia


Is this a current diagnosis for this admission?: Yes   


Plan: 


Improving








(4) Acute pancreatitis


Qualifiers: 


   Pancreatitis type: alcohol induced 


Is this a current diagnosis for this admission?: Yes   


Plan: 


Her lipase trended down markedly her latest lipase is 457.8








(5) Suicide attempt


Is this a current diagnosis for this admission?: Yes   


Plan: 


Patient denies she is suicidal.  Had been evaluated by psych and she is clipped.








(6) Alcoholism /alcohol abuse


Is this a current diagnosis for this admission?: Yes   


Plan: 


Patient advised to remain sober.

## 2019-03-18 RX ADMIN — BENZTROPINE MESYLATE SCH MG: 1 TABLET ORAL at 10:01

## 2019-03-18 RX ADMIN — ROPINIROLE HYDROCHLORIDE SCH MG: 1 TABLET, FILM COATED ORAL at 21:18

## 2019-03-18 RX ADMIN — HYDROXYZINE PAMOATE SCH MG: 25 CAPSULE ORAL at 10:01

## 2019-03-18 RX ADMIN — Medication SCH ML: at 21:17

## 2019-03-18 RX ADMIN — METRONIDAZOLE SCH MG: 500 TABLET ORAL at 12:08

## 2019-03-18 RX ADMIN — ACETAMINOPHEN PRN MG: 325 TABLET ORAL at 21:17

## 2019-03-18 RX ADMIN — Medication SCH MG: at 05:38

## 2019-03-18 RX ADMIN — PROPRANOLOL HYDROCHLORIDE SCH MG: 20 TABLET ORAL at 21:18

## 2019-03-18 RX ADMIN — GABAPENTIN SCH MG: 400 CAPSULE ORAL at 21:18

## 2019-03-18 RX ADMIN — METRONIDAZOLE SCH MG: 500 TABLET ORAL at 05:38

## 2019-03-18 RX ADMIN — DIAZEPAM SCH MG: 5 TABLET ORAL at 05:38

## 2019-03-18 RX ADMIN — HEPARIN SODIUM SCH: 5000 INJECTION, SOLUTION INTRAVENOUS; SUBCUTANEOUS at 05:39

## 2019-03-18 RX ADMIN — HEPARIN SODIUM SCH: 5000 INJECTION, SOLUTION INTRAVENOUS; SUBCUTANEOUS at 21:14

## 2019-03-18 RX ADMIN — BUSPIRONE HYDROCHLORIDE SCH MG: 10 TABLET ORAL at 17:44

## 2019-03-18 RX ADMIN — QUETIAPINE FUMARATE SCH MG: 25 TABLET, FILM COATED ORAL at 21:17

## 2019-03-18 RX ADMIN — OLANZAPINE SCH MG: 5 TABLET, FILM COATED ORAL at 10:01

## 2019-03-18 RX ADMIN — OLANZAPINE SCH MG: 5 TABLET, FILM COATED ORAL at 17:44

## 2019-03-18 RX ADMIN — HYDROXYZINE PAMOATE SCH MG: 25 CAPSULE ORAL at 14:08

## 2019-03-18 RX ADMIN — GABAPENTIN SCH MG: 400 CAPSULE ORAL at 05:39

## 2019-03-18 RX ADMIN — HYDROXYZINE PAMOATE SCH MG: 25 CAPSULE ORAL at 17:44

## 2019-03-18 RX ADMIN — DULOXETINE SCH MG: 30 CAPSULE, DELAYED RELEASE ORAL at 17:44

## 2019-03-18 RX ADMIN — Medication SCH MG: at 12:08

## 2019-03-18 RX ADMIN — DULOXETINE SCH MG: 30 CAPSULE, DELAYED RELEASE ORAL at 10:01

## 2019-03-18 RX ADMIN — Medication SCH MG: at 21:17

## 2019-03-18 RX ADMIN — Medication SCH MG: at 14:09

## 2019-03-18 RX ADMIN — NICOTINE SCH EACH: 21 PATCH, EXTENDED RELEASE TOPICAL at 10:01

## 2019-03-18 RX ADMIN — HYDROXYZINE PAMOATE SCH MG: 25 CAPSULE ORAL at 21:18

## 2019-03-18 RX ADMIN — Medication SCH ML: at 14:08

## 2019-03-18 RX ADMIN — DIAZEPAM SCH MG: 5 TABLET ORAL at 17:45

## 2019-03-18 RX ADMIN — ROPINIROLE HYDROCHLORIDE SCH MG: 1 TABLET, FILM COATED ORAL at 10:01

## 2019-03-18 RX ADMIN — GABAPENTIN SCH MG: 400 CAPSULE ORAL at 14:08

## 2019-03-18 RX ADMIN — PROPRANOLOL HYDROCHLORIDE SCH MG: 20 TABLET ORAL at 10:01

## 2019-03-18 RX ADMIN — LORAZEPAM PRN MG: 2 INJECTION INTRAMUSCULAR; INTRAVENOUS at 08:34

## 2019-03-18 RX ADMIN — BUSPIRONE HYDROCHLORIDE SCH MG: 10 TABLET ORAL at 10:01

## 2019-03-18 RX ADMIN — HEPARIN SODIUM SCH: 5000 INJECTION, SOLUTION INTRAVENOUS; SUBCUTANEOUS at 14:07

## 2019-03-18 RX ADMIN — Medication SCH ML: at 05:39

## 2019-03-18 RX ADMIN — DIAZEPAM SCH MG: 5 TABLET ORAL at 12:08

## 2019-03-18 RX ADMIN — DIAZEPAM SCH MG: 5 TABLET ORAL at 23:20

## 2019-03-18 RX ADMIN — AMLODIPINE BESYLATE SCH MG: 10 TABLET ORAL at 10:01

## 2019-03-18 NOTE — PDOC PROGRESS REPORT
Subjective


Subjective:: 


This is a 58 years old  female patient with past medical history of 

depression and alcohol abuse brought by EMS for being suicide.  Her initial 

blood work shows markedly elevated lipase of 9352 she has also elevated liver 

chemistry no both values has remarkably improved.  Patient able to eat and 

tolerate well.  Her BMP shows hypokalemia with potassium of 2.7 which is 

replaced now it is 3.6.  Admission patient also found to have very foul-smelling

diarrhea and her stool test is positive for C. difficile colitis and she has 

been managed with p.o. Flagyl and vancomycin.  Now her diarrhea has subsided now

that she is constipated.  Patient evaluated by PT who recommended SNF placement.


Reason For Visit: 


PANCREATITIS, ETOH INTOXICATION








Physical Exam


Vital Signs: 


                                        











Temp Pulse Resp BP Pulse Ox


 


 98.2 F   92   14   136/72 H  95 


 


 03/18/19 04:30  03/18/19 07:59  03/18/19 07:59  03/18/19 07:59  03/18/19 07:59








                                 Intake & Output











 03/17/19 03/18/19 03/19/19





 06:59 06:59 06:59


 


Intake Total 1000 1147 


 


Output Total 600 950 


 


Balance 400 197 


 


Weight 67.5 kg 67.5 kg 











General appearance: PRESENT: no acute distress


Eye exam: PRESENT: conjunctiva pink


Mouth exam: PRESENT: moist


Neck exam: ABSENT: carotid bruit, JVD, lymphadenopathy, thyromegaly


Cardiovascular exam: PRESENT: RRR.  ABSENT: diastolic murmur, rubs, systolic 

murmur


GI/Abdominal exam: PRESENT: normal bowel sounds, soft.  ABSENT: distended, 

guarding, mass, organolmegaly, rebound, tenderness


Neurological exam: PRESENT: alert, awake





Results


Laboratory Results: 


                                        





                                 03/09/19 07:09 





                                 03/17/19 05:07 





                                        











  03/08/19





  11:47


 


Troponin I  < 0.012











Impressions: 


                                        





Abdomen/Pelvis CT  03/08/19 11:42


IMPRESSION:  Fatty liver


Gallstones


Peripancreatic retroperitoneal inflammation, question pancreatitis


 








Cervical Spine CT  03/08/19 11:42


IMPRESSION:  CHRONIC DEGENERATIVE CHANGES.  NO ACUTE FINDINGS. Similar left 

thyroid nodule with calcifications.


 








Chest CT  03/08/19 11:42


IMPRESSION:  No acute changes


 








Head CT  03/08/19 11:42


IMPRESSION:  No acute intracranial findings.


EVIDENCE OF ACUTE STROKE: NO.


 














Assessment & Plan





- Diagnosis


(1) Intermittent altered mental status


Is this a current diagnosis for this admission?: Yes   


Plan: 


Most probably related to her underlying psychiatric disorder and alcohol 

withdrawal.


She is on Ativan and diazepam.








(2) Elevated liver chemistry


Is this a current diagnosis for this admission?: Yes   





(3) Clostridium difficile colitis


Is this a current diagnosis for this admission?: Yes   





(4) Hypokalemia


Is this a current diagnosis for this admission?: Yes   





(5) Acute pancreatitis


Qualifiers: 


   Pancreatitis type: alcohol induced 


Is this a current diagnosis for this admission?: Yes   





(6) Suicide attempt


Is this a current diagnosis for this admission?: Yes   





(7) Alcoholism /alcohol abuse


Is this a current diagnosis for this admission?: Yes

## 2019-03-19 RX ADMIN — DULOXETINE SCH MG: 30 CAPSULE, DELAYED RELEASE ORAL at 17:21

## 2019-03-19 RX ADMIN — BENZTROPINE MESYLATE SCH MG: 1 TABLET ORAL at 09:58

## 2019-03-19 RX ADMIN — NICOTINE SCH EACH: 21 PATCH, EXTENDED RELEASE TOPICAL at 09:55

## 2019-03-19 RX ADMIN — DIAZEPAM SCH MG: 5 TABLET ORAL at 05:15

## 2019-03-19 RX ADMIN — HYDROXYZINE PAMOATE SCH MG: 25 CAPSULE ORAL at 21:21

## 2019-03-19 RX ADMIN — Medication SCH ML: at 05:15

## 2019-03-19 RX ADMIN — HEPARIN SODIUM SCH: 5000 INJECTION, SOLUTION INTRAVENOUS; SUBCUTANEOUS at 05:04

## 2019-03-19 RX ADMIN — DIAZEPAM SCH MG: 5 TABLET ORAL at 12:02

## 2019-03-19 RX ADMIN — Medication SCH ML: at 21:22

## 2019-03-19 RX ADMIN — GABAPENTIN SCH MG: 400 CAPSULE ORAL at 13:25

## 2019-03-19 RX ADMIN — Medication SCH MG: at 13:25

## 2019-03-19 RX ADMIN — OLANZAPINE SCH MG: 5 TABLET, FILM COATED ORAL at 17:21

## 2019-03-19 RX ADMIN — HEPARIN SODIUM SCH: 5000 INJECTION, SOLUTION INTRAVENOUS; SUBCUTANEOUS at 13:21

## 2019-03-19 RX ADMIN — GABAPENTIN SCH MG: 400 CAPSULE ORAL at 21:21

## 2019-03-19 RX ADMIN — HYDROXYZINE PAMOATE SCH MG: 25 CAPSULE ORAL at 17:22

## 2019-03-19 RX ADMIN — DIAZEPAM SCH MG: 5 TABLET ORAL at 17:21

## 2019-03-19 RX ADMIN — ROPINIROLE HYDROCHLORIDE SCH MG: 1 TABLET, FILM COATED ORAL at 21:21

## 2019-03-19 RX ADMIN — HEPARIN SODIUM SCH: 5000 INJECTION, SOLUTION INTRAVENOUS; SUBCUTANEOUS at 21:21

## 2019-03-19 RX ADMIN — Medication SCH MG: at 05:15

## 2019-03-19 RX ADMIN — ROPINIROLE HYDROCHLORIDE SCH MG: 1 TABLET, FILM COATED ORAL at 09:58

## 2019-03-19 RX ADMIN — QUETIAPINE FUMARATE SCH MG: 25 TABLET, FILM COATED ORAL at 23:06

## 2019-03-19 RX ADMIN — BUSPIRONE HYDROCHLORIDE SCH MG: 10 TABLET ORAL at 09:58

## 2019-03-19 RX ADMIN — GABAPENTIN SCH MG: 400 CAPSULE ORAL at 05:15

## 2019-03-19 RX ADMIN — BUSPIRONE HYDROCHLORIDE SCH MG: 10 TABLET ORAL at 17:21

## 2019-03-19 RX ADMIN — PROPRANOLOL HYDROCHLORIDE SCH MG: 20 TABLET ORAL at 09:59

## 2019-03-19 RX ADMIN — DIAZEPAM SCH MG: 5 TABLET ORAL at 23:40

## 2019-03-19 RX ADMIN — HYDROXYZINE PAMOATE SCH MG: 25 CAPSULE ORAL at 13:25

## 2019-03-19 RX ADMIN — Medication SCH ML: at 13:25

## 2019-03-19 RX ADMIN — DULOXETINE SCH MG: 30 CAPSULE, DELAYED RELEASE ORAL at 09:58

## 2019-03-19 RX ADMIN — OLANZAPINE SCH MG: 5 TABLET, FILM COATED ORAL at 09:59

## 2019-03-19 RX ADMIN — Medication SCH MG: at 23:06

## 2019-03-19 RX ADMIN — AMLODIPINE BESYLATE SCH MG: 10 TABLET ORAL at 09:58

## 2019-03-19 RX ADMIN — PROPRANOLOL HYDROCHLORIDE SCH MG: 20 TABLET ORAL at 21:21

## 2019-03-19 RX ADMIN — HYDROXYZINE PAMOATE SCH MG: 25 CAPSULE ORAL at 09:59

## 2019-03-19 NOTE — PDOC PROGRESS REPORT
Subjective


Progress Note for:: 03/19/19


Subjective:: 





58 years old  female patient with past medical history of depression 

and alcohol abuse brought by EMS for being suicide.  Her initial blood work 

shows markedly elevated lipase of 9352 she has also elevated liver chemistry no 

both values has remarkably improved.  Patient able to eat and tolerate well.  

Her BMP shows hypokalemia with potassium of 2.7 which is replaced now it is 3.6.

 Admission patient also found to have very foul-smelling diarrhea and her stool 

test is positive for C. difficile colitis and she has been managed with p.o. 

Flagyl and vancomycin.  Now her diarrhea has subsided now that she is 

constipated.  Patient evaluated by PT who recommended SNF placement.





3/19/2019-I had a long discussion with the patient's daughter and the she  re

ally thinks patient has hallucinations on daily bais/ having the suicidal 

thoughts and ideations she thinks she is unsafe for her mom  to be discharged 

she wants this psychiatric team to come and evaluate her mom but I spoke to the 

1 of the staff from the psychiatric team they told me that patient is cleared no

need for further follow-up and I was also notified that patient does not qualify

for inpatient psych rehab.  May be the hospital administration will help in 

arranging a meeting with the psychiatric team and the patient's daughter so they

can come to a understandable plan and management.  Nurse is telling me patient 

is constipated requesting suppositories and was to start on mechanical soft diet

agreed with the request.  Nurse called me just now to notify me that patient is 

agitated and anxious combative requesting soft restraints.


Reason For Visit: 


PANCREATITIS, ETOH INTOXICATION








Physical Exam


Vital Signs: 


                                        











Temp Pulse Resp BP Pulse Ox


 


 98.0 F   82   20   124/69   97 


 


 03/19/19 11:38  03/19/19 14:00  03/19/19 11:38  03/19/19 11:38  03/19/19 11:38








                                 Intake & Output











 03/18/19 03/19/19 03/20/19





 06:59 06:59 06:59


 


Intake Total 1147 1403 474


 


Output Total 950 800 550


 


Balance 197 603 -76


 


Weight 67.5 kg 67 kg 











General appearance: PRESENT: mild distress


Head exam: PRESENT: atraumatic


Eye exam: PRESENT: PERRLA


Mouth exam: PRESENT: moist, tongue midline


Neck exam: ABSENT: carotid bruit, JVD, lymphadenopathy, thyromegaly


Respiratory exam: PRESENT: decreased breath sounds


Cardiovascular exam: PRESENT: tachycardia


GI/Abdominal exam: PRESENT: normal bowel sounds, soft.  ABSENT: distended, 

guarding, mass, organolmegaly, rebound, tenderness


Extremities exam: PRESENT: full ROM.  ABSENT: calf tenderness, clubbing, pedal 

edema


Neurological exam: PRESENT: alert, awake, CN II-XII grossly intact


Psychiatric exam: PRESENT: agitated, anxious





Results


Laboratory Results: 


                                        





                                 03/09/19 07:09 





                                 03/17/19 05:07 





                                        











  03/08/19





  11:47


 


Troponin I  < 0.012











Impressions: 


                                        





Abdomen/Pelvis CT  03/08/19 11:42


IMPRESSION:  Fatty liver


Gallstones


Peripancreatic retroperitoneal inflammation, question pancreatitis


 








Cervical Spine CT  03/08/19 11:42


IMPRESSION:  CHRONIC DEGENERATIVE CHANGES.  NO ACUTE FINDINGS. Similar left 

thyroid nodule with calcifications.


 








Chest CT  03/08/19 11:42


IMPRESSION:  No acute changes


 








Head CT  03/08/19 11:42


IMPRESSION:  No acute intracranial findings.


EVIDENCE OF ACUTE STROKE: NO.


 














Assessment and Plan





- Diagnosis


(1) Intermittent altered mental status


Is this a current diagnosis for this admission?: Yes   


Plan: 








Most probably related to her underlying psychiatric disorder and alcohol 

withdrawal.


She is on Ativan and diazepam.





3/19/2019-patient was agitated and anxious today not cooperative and combative 

going to put her on a soft restraints.  Altered mental status probably secondary

to underlying psychiatric disorders.  Patient is presently on IV Ativan and po 

diazepam.  Patient's home medications are restarted today.








(2) Elevated liver chemistry


Is this a current diagnosis for this admission?: Yes   


Plan: 





03/19/19 14:34


Patient is admitted with elevated liver enzymes the gradually coming back to 

normal.  It is most likely secondary to alcoholic liver disease.








(3) Clostridium difficile colitis


Is this a current diagnosis for this admission?: Yes   


Plan: 





03/19/19 14:35


Patient was admitted with C. difficile colitis and a severe diarrhea stool 

culture is positive for C. difficile now she is severely constipated to start 

her on Dulcolax suppositories p.o. vancomycin was discontinued.








(4) Acute pancreatitis


Qualifiers: 


   Pancreatitis type: alcohol induced 


Is this a current diagnosis for this admission?: Yes   


Plan: 





03/19/19 14:36


3/19/2019 patient is admitted with acute pancreatitis most likely secondary to 

chronic alcohol abuse.








(5) Suicide attempt


Is this a current diagnosis for this admission?: Yes   


Plan: 





03/19/19 14:36


3/19/2019 patient admitted with suicidal ideation and attempt patient is cleared

by psychiatric team.








- Time


Time Spent with patient: 15-24 minutes


Smoking Cessation Education: over 10 minutes


Medications reviewed and adjusted accordingly: Yes


Anticipated discharge: Home

## 2019-03-20 LAB
ADD MANUAL DIFF: NO
ALBUMIN SERPL-MCNC: 3.2 G/DL (ref 3.5–5)
ALP SERPL-CCNC: 116 U/L (ref 38–126)
ALT SERPL-CCNC: 25 U/L (ref 9–52)
ANION GAP SERPL CALC-SCNC: 11 MMOL/L (ref 5–19)
AST SERPL-CCNC: 39 U/L (ref 14–36)
BASOPHILS # BLD AUTO: 0.1 10^3/UL (ref 0–0.2)
BASOPHILS NFR BLD AUTO: 1.3 % (ref 0–2)
BILIRUB DIRECT SERPL-MCNC: 0.4 MG/DL (ref 0–0.4)
BILIRUB SERPL-MCNC: 0.4 MG/DL (ref 0.2–1.3)
BUN SERPL-MCNC: 6 MG/DL (ref 7–20)
CALCIUM: 9.6 MG/DL (ref 8.4–10.2)
CHLORIDE SERPL-SCNC: 101 MMOL/L (ref 98–107)
CO2 SERPL-SCNC: 27 MMOL/L (ref 22–30)
EOSINOPHIL # BLD AUTO: 0.1 10^3/UL (ref 0–0.6)
EOSINOPHIL NFR BLD AUTO: 1.8 % (ref 0–6)
ERYTHROCYTE [DISTWIDTH] IN BLOOD BY AUTOMATED COUNT: 15.5 % (ref 11.5–14)
GLUCOSE SERPL-MCNC: 122 MG/DL (ref 75–110)
HCT VFR BLD CALC: 35.7 % (ref 36–47)
HGB BLD-MCNC: 12.1 G/DL (ref 12–15.5)
LYMPHOCYTES # BLD AUTO: 1.3 10^3/UL (ref 0.5–4.7)
LYMPHOCYTES NFR BLD AUTO: 21.1 % (ref 13–45)
MCH RBC QN AUTO: 30.8 PG (ref 27–33.4)
MCHC RBC AUTO-ENTMCNC: 33.9 G/DL (ref 32–36)
MCV RBC AUTO: 91 FL (ref 80–97)
MONOCYTES # BLD AUTO: 0.8 10^3/UL (ref 0.1–1.4)
MONOCYTES NFR BLD AUTO: 13 % (ref 3–13)
NEUTROPHILS # BLD AUTO: 3.9 10^3/UL (ref 1.7–8.2)
NEUTS SEG NFR BLD AUTO: 62.8 % (ref 42–78)
PLATELET # BLD: 461 10^3/UL (ref 150–450)
POTASSIUM SERPL-SCNC: 4.2 MMOL/L (ref 3.6–5)
PROT SERPL-MCNC: 6.4 G/DL (ref 6.3–8.2)
RBC # BLD AUTO: 3.94 10^6/UL (ref 3.72–5.28)
SODIUM SERPL-SCNC: 138.9 MMOL/L (ref 137–145)
TOTAL CELLS COUNTED % (AUTO): 100 %
WBC # BLD AUTO: 6.3 10^3/UL (ref 4–10.5)

## 2019-03-20 RX ADMIN — DIAZEPAM SCH MG: 5 TABLET ORAL at 18:37

## 2019-03-20 RX ADMIN — QUETIAPINE FUMARATE SCH MG: 25 TABLET, FILM COATED ORAL at 21:30

## 2019-03-20 RX ADMIN — HYDROXYZINE PAMOATE SCH MG: 25 CAPSULE ORAL at 10:19

## 2019-03-20 RX ADMIN — HYDROXYZINE PAMOATE SCH MG: 25 CAPSULE ORAL at 21:29

## 2019-03-20 RX ADMIN — BENZTROPINE MESYLATE SCH MG: 1 TABLET ORAL at 10:19

## 2019-03-20 RX ADMIN — LORAZEPAM PRN MG: 2 INJECTION INTRAMUSCULAR; INTRAVENOUS at 06:42

## 2019-03-20 RX ADMIN — Medication SCH ML: at 21:30

## 2019-03-20 RX ADMIN — DIAZEPAM SCH MG: 5 TABLET ORAL at 05:55

## 2019-03-20 RX ADMIN — HEPARIN SODIUM SCH: 5000 INJECTION, SOLUTION INTRAVENOUS; SUBCUTANEOUS at 05:26

## 2019-03-20 RX ADMIN — PROPRANOLOL HYDROCHLORIDE SCH MG: 20 TABLET ORAL at 21:29

## 2019-03-20 RX ADMIN — BUSPIRONE HYDROCHLORIDE SCH MG: 10 TABLET ORAL at 10:19

## 2019-03-20 RX ADMIN — DIAZEPAM SCH MG: 5 TABLET ORAL at 23:17

## 2019-03-20 RX ADMIN — DULOXETINE SCH MG: 30 CAPSULE, DELAYED RELEASE ORAL at 10:19

## 2019-03-20 RX ADMIN — LORAZEPAM PRN MG: 2 INJECTION INTRAMUSCULAR; INTRAVENOUS at 00:30

## 2019-03-20 RX ADMIN — LORAZEPAM PRN MG: 2 INJECTION INTRAMUSCULAR; INTRAVENOUS at 10:45

## 2019-03-20 RX ADMIN — Medication SCH ML: at 15:07

## 2019-03-20 RX ADMIN — AMLODIPINE BESYLATE SCH MG: 10 TABLET ORAL at 10:19

## 2019-03-20 RX ADMIN — Medication SCH MG: at 21:29

## 2019-03-20 RX ADMIN — HYDROXYZINE PAMOATE SCH MG: 25 CAPSULE ORAL at 18:33

## 2019-03-20 RX ADMIN — Medication SCH MG: at 05:54

## 2019-03-20 RX ADMIN — GABAPENTIN SCH MG: 400 CAPSULE ORAL at 15:06

## 2019-03-20 RX ADMIN — DIAZEPAM SCH MG: 5 TABLET ORAL at 11:58

## 2019-03-20 RX ADMIN — OLANZAPINE SCH MG: 5 TABLET, FILM COATED ORAL at 18:33

## 2019-03-20 RX ADMIN — DULOXETINE SCH MG: 30 CAPSULE, DELAYED RELEASE ORAL at 18:33

## 2019-03-20 RX ADMIN — GABAPENTIN SCH MG: 400 CAPSULE ORAL at 05:54

## 2019-03-20 RX ADMIN — BUSPIRONE HYDROCHLORIDE SCH MG: 10 TABLET ORAL at 18:33

## 2019-03-20 RX ADMIN — Medication SCH MG: at 15:06

## 2019-03-20 RX ADMIN — GABAPENTIN SCH MG: 400 CAPSULE ORAL at 21:29

## 2019-03-20 RX ADMIN — OLANZAPINE SCH MG: 5 TABLET, FILM COATED ORAL at 10:19

## 2019-03-20 RX ADMIN — HEPARIN SODIUM SCH UNIT: 5000 INJECTION, SOLUTION INTRAVENOUS; SUBCUTANEOUS at 21:30

## 2019-03-20 RX ADMIN — HEPARIN SODIUM SCH UNIT: 5000 INJECTION, SOLUTION INTRAVENOUS; SUBCUTANEOUS at 15:07

## 2019-03-20 RX ADMIN — Medication SCH ML: at 05:26

## 2019-03-20 RX ADMIN — ROPINIROLE HYDROCHLORIDE SCH MG: 1 TABLET, FILM COATED ORAL at 10:19

## 2019-03-20 RX ADMIN — HYDROXYZINE PAMOATE SCH MG: 25 CAPSULE ORAL at 15:06

## 2019-03-20 RX ADMIN — ROPINIROLE HYDROCHLORIDE SCH MG: 1 TABLET, FILM COATED ORAL at 21:29

## 2019-03-20 RX ADMIN — NICOTINE SCH EACH: 21 PATCH, EXTENDED RELEASE TOPICAL at 10:19

## 2019-03-20 RX ADMIN — PROPRANOLOL HYDROCHLORIDE SCH MG: 20 TABLET ORAL at 10:19

## 2019-03-20 NOTE — PDOC PROGRESS REPORT
Subjective


Progress Note for:: 03/20/19


Subjective:: 





58 years old  female patient with past medical history of depression 

and alcohol abuse brought by EMS for being suicide.  Her initial blood work 

shows markedly elevated lipase of 9352 she has also elevated liver chemistry no 

both values has remarkably improved.  Patient able to eat and tolerate well.  

Her BMP shows hypokalemia with potassium of 2.7 which is replaced now it is 3.6.

 Admission patient also found to have very foul-smelling diarrhea and her stool 

test is positive for C. difficile colitis and she has been managed with p.o. 

Flagyl and vancomycin.  Now her diarrhea has subsided now that she is 

constipated.  Patient evaluated by PT who recommended SNF placement.





3/19/2019-I had a long discussion with the patient's daughter and the she  re

ally thinks patient has hallucinations on daily bais/ having the suicidal 

thoughts and ideations she thinks she is unsafe for her mom  to be discharged 

she wants this psychiatric team to come and evaluate her mom but I spoke to the 

1 of the staff from the psychiatric team they told me that patient is cleared no

need for further follow-up and I was also notified that patient does not qualify

for inpatient psych rehab.  May be the hospital administration will help in 

arranging a meeting with the psychiatric team and the patient's daughter so they

can come to a understandable plan and management.  Nurse is telling me patient 

is constipated requesting suppositories and was to start on mechanical soft diet

agreed with the request.  Nurse called me just now to notify me that patient is 

agitated and anxious combative requesting soft restraints.





3/20/2019-no acute events in the last 24 hours.  Patient is afebrile.  Patient 

had fluctuation in the moods.  I spoke to patient's daughter Tammie she want her

mom to go to AA long-term care facility and she is not in a situation to take 

care of her mom at home.  But the patient is insisting that she is going home 

she is not going to a long-term care facility.  The daughter and psychiatric 

team going to meet today to evaluate the plan of care.


Reason For Visit: 


PANCREATITIS, ETOH INTOXICATION








Physical Exam


Vital Signs: 


                                        











Temp Pulse Resp BP Pulse Ox


 


 98.1 F   83   18   129/66 H  95 


 


 03/20/19 11:24  03/20/19 11:24  03/20/19 11:24  03/20/19 11:24  03/20/19 11:24








                                 Intake & Output











 03/19/19 03/20/19 03/21/19





 06:59 06:59 06:59


 


Intake Total 1403 1001 


 


Output Total 800 1705 650


 


Balance 790 -531 -257


 


Weight 67 kg 68.6 kg 











General appearance: PRESENT: no acute distress


Head exam: PRESENT: atraumatic


Eye exam: PRESENT: PERRLA


Mouth exam: PRESENT: moist, tongue midline


Neck exam: ABSENT: carotid bruit, JVD, lymphadenopathy, thyromegaly


Respiratory exam: PRESENT: decreased breath sounds


Cardiovascular exam: PRESENT: tachycardia


GI/Abdominal exam: PRESENT: normal bowel sounds, soft.  ABSENT: distended, 

guarding, mass, organolmegaly, rebound, tenderness


Extremities exam: PRESENT: full ROM.  ABSENT: calf tenderness, clubbing, pedal 

edema


Neurological exam: PRESENT: alert, awake, oriented to person, oriented to place,

oriented to time, oriented to situation, CN II-XII grossly intact.  ABSENT: 

motor sensory deficit


Psychiatric exam: PRESENT: appropriate affect, normal mood.  ABSENT: homicidal 

ideation, suicidal ideation





Results


Laboratory Results: 


                                        





                                 03/20/19 08:10 





                                 03/20/19 05:51 





                                        











  03/19/19 03/20/19 03/20/19





  19:21 05:51 05:51


 


WBC   Cancelled 


 


RBC   Cancelled 


 


Hgb   Cancelled 


 


Hct   Cancelled 


 


MCV   Cancelled 


 


MCH   Cancelled 


 


MCHC   Cancelled 


 


RDW   Cancelled 


 


Plt Count   Cancelled 


 


Seg Neutrophils %   Cancelled 


 


Lymphocytes %   Cancelled 


 


Monocytes %   Cancelled 


 


Eosinophils %   Cancelled 


 


Basophils %   Cancelled 


 


Absolute Neutrophils   Cancelled 


 


Absolute Lymphocytes   Cancelled 


 


Absolute Monocytes   Cancelled 


 


Absolute Eosinophils   Cancelled 


 


Absolute Basophils   Cancelled 


 


Sodium    138.9


 


Potassium    4.2


 


Chloride    101


 


Carbon Dioxide    27


 


Anion Gap    11


 


BUN    6 L


 


Creatinine    0.50 L


 


Est GFR ( Amer)    > 60


 


Est GFR (Non-Af Amer)    > 60


 


Glucose    122 H


 


Calcium    9.6


 


Magnesium    1.9


 


Total Bilirubin    0.4


 


AST    39 H


 


ALT    25


 


Alkaline Phosphatase    116


 


Total Protein    6.4


 


Albumin    3.2 L


 


Lipase  279.8  














  03/20/19





  08:10


 


WBC  6.3


 


RBC  3.94


 


Hgb  12.1


 


Hct  35.7 L


 


MCV  91  D


 


MCH  30.8


 


MCHC  33.9


 


RDW  15.5 H


 


Plt Count  461 H


 


Seg Neutrophils %  62.8


 


Lymphocytes %  21.1


 


Monocytes %  13.0


 


Eosinophils %  1.8


 


Basophils %  1.3


 


Absolute Neutrophils  3.9


 


Absolute Lymphocytes  1.3


 


Absolute Monocytes  0.8


 


Absolute Eosinophils  0.1


 


Absolute Basophils  0.1


 


Sodium 


 


Potassium 


 


Chloride 


 


Carbon Dioxide 


 


Anion Gap 


 


BUN 


 


Creatinine 


 


Est GFR ( Amer) 


 


Est GFR (Non-Af Amer) 


 


Glucose 


 


Calcium 


 


Magnesium 


 


Total Bilirubin 


 


AST 


 


ALT 


 


Alkaline Phosphatase 


 


Total Protein 


 


Albumin 


 


Lipase 








                                        











  03/08/19





  11:47


 


Troponin I  < 0.012











Impressions: 


                                        





Abdomen/Pelvis CT  03/08/19 11:42


IMPRESSION:  Fatty liver


Gallstones


Peripancreatic retroperitoneal inflammation, question pancreatitis


 








Cervical Spine CT  03/08/19 11:42


IMPRESSION:  CHRONIC DEGENERATIVE CHANGES.  NO ACUTE FINDINGS. Similar left 

thyroid nodule with calcifications.


 








Chest CT  03/08/19 11:42


IMPRESSION:  No acute changes


 








Head CT  03/08/19 11:42


IMPRESSION:  No acute intracranial findings.


EVIDENCE OF ACUTE STROKE: NO.


 














Assessment and Plan





- Diagnosis


(1) Intermittent altered mental status


Is this a current diagnosis for this admission?: Yes   


Plan: 








Most probably related to her underlying psychiatric disorder and alcohol 

withdrawal.


She is on Ativan and diazepam.





3/19/2019-patient was agitated and anxious today not cooperative and combative 

going to put her on a soft restraints.  Altered mental status probably secondary

to underlying psychiatric disorders.  Patient is presently on IV Ativan and po 

diazepam.  Patient's home medications are restarted today.





3/20/2019-patient has fluctuation in the mood most likely secondary to 

underlying psychiatric disorders psych is going to reevaluate her today and they

have plans to sit down and discuss the care with patient's daughter today.  The 

meantime will continue the present management.








(2) Elevated liver chemistry


Is this a current diagnosis for this admission?: Yes   


Plan: 





03/19/19 14:34


Patient is admitted with elevated liver enzymes the gradually coming back to 

normal.  It is most likely secondary to alcoholic liver disease.





3/20/2019-patient was admitted with elevated liver enzymes the progressively 

coming towards normal.  Today AST is 39 slightly elevated, ALT is 25 normal, 

alkaline phosphatase is 160 normal.  Elevated liver enzymes probably acute insul

t on the  liver secondary to alcohol abuse.








(3) Clostridium difficile colitis


Is this a current diagnosis for this admission?: Yes   


Plan: 





03/19/19 14:35


Patient was admitted with C. difficile colitis and a severe diarrhea stool cul

ture is positive for C. difficile now she is severely constipated to start her 

on Dulcolax suppositories p.o. vancomycin was discontinued.





3/20/2019-patient was admitted with C. difficile colitis.  C. difficile was 

positive.  Diarrhea resolved.  She has not had any bowel movement for the last 2

days.  She still on contact isolation until repeat stool cultures are negative 

for C. difficile.  She is off p.o. vancomycin.








(4) Acute pancreatitis


Qualifiers: 


   Pancreatitis type: alcohol induced 


Is this a current diagnosis for this admission?: Yes   


Plan: 





03/19/19 14:36


3/19/2019 patient is admitted with acute pancreatitis most likely secondary to 

chronic alcohol abuse.





3/20/2019-patient was admitted with acute on chronic pancreatitis most likely 

secondary to chronic alcohol abuse.  Latest lipase is 279.








(5) Suicide attempt


Is this a current diagnosis for this admission?: Yes   


Plan: 





03/19/19 14:36


3/19/2019 patient admitted with suicidal ideation and attempt patient is cleared

by psychiatric team.





3/20/2019-patient is still having the mood swings and hallucinations.  Denies 

any suicidal thoughts and ideations.








- Time


Time Spent with patient: 15-24 minutes


Smoking Cessation Education: over 10 minutes


Medications reviewed and adjusted accordingly: Yes


Anticipated discharge: SNF

## 2019-03-21 LAB — HEPATITIS C VIRUS ANTIBODY: <0.1 S/CO RATIO (ref 0–0.9)

## 2019-03-21 RX ADMIN — PROPRANOLOL HYDROCHLORIDE SCH MG: 20 TABLET ORAL at 22:33

## 2019-03-21 RX ADMIN — PROPRANOLOL HYDROCHLORIDE SCH MG: 20 TABLET ORAL at 09:53

## 2019-03-21 RX ADMIN — DIAZEPAM SCH MG: 5 TABLET ORAL at 14:44

## 2019-03-21 RX ADMIN — Medication SCH MG: at 07:54

## 2019-03-21 RX ADMIN — BENZTROPINE MESYLATE SCH MG: 1 TABLET ORAL at 09:53

## 2019-03-21 RX ADMIN — HEPARIN SODIUM SCH UNIT: 5000 INJECTION, SOLUTION INTRAVENOUS; SUBCUTANEOUS at 05:45

## 2019-03-21 RX ADMIN — DULOXETINE SCH MG: 30 CAPSULE, DELAYED RELEASE ORAL at 09:52

## 2019-03-21 RX ADMIN — HEPARIN SODIUM SCH UNIT: 5000 INJECTION, SOLUTION INTRAVENOUS; SUBCUTANEOUS at 14:45

## 2019-03-21 RX ADMIN — Medication SCH ML: at 14:45

## 2019-03-21 RX ADMIN — NICOTINE SCH EACH: 21 PATCH, EXTENDED RELEASE TOPICAL at 09:53

## 2019-03-21 RX ADMIN — OLANZAPINE SCH MG: 5 TABLET, FILM COATED ORAL at 09:53

## 2019-03-21 RX ADMIN — OLANZAPINE SCH MG: 5 TABLET, FILM COATED ORAL at 18:07

## 2019-03-21 RX ADMIN — BUSPIRONE HYDROCHLORIDE SCH MG: 10 TABLET ORAL at 09:52

## 2019-03-21 RX ADMIN — Medication SCH ML: at 05:56

## 2019-03-21 RX ADMIN — HYDROXYZINE PAMOATE SCH MG: 25 CAPSULE ORAL at 14:44

## 2019-03-21 RX ADMIN — POLYETHYLENE GLYCOL 3350 SCH GM: 17 POWDER, FOR SOLUTION ORAL at 09:52

## 2019-03-21 RX ADMIN — Medication SCH ML: at 22:34

## 2019-03-21 RX ADMIN — HEPARIN SODIUM SCH UNIT: 5000 INJECTION, SOLUTION INTRAVENOUS; SUBCUTANEOUS at 22:34

## 2019-03-21 RX ADMIN — QUETIAPINE FUMARATE SCH MG: 25 TABLET, FILM COATED ORAL at 22:33

## 2019-03-21 RX ADMIN — DULOXETINE SCH MG: 30 CAPSULE, DELAYED RELEASE ORAL at 18:07

## 2019-03-21 RX ADMIN — Medication SCH MG: at 14:44

## 2019-03-21 RX ADMIN — GABAPENTIN SCH MG: 400 CAPSULE ORAL at 22:33

## 2019-03-21 RX ADMIN — GABAPENTIN SCH MG: 400 CAPSULE ORAL at 14:44

## 2019-03-21 RX ADMIN — DIAZEPAM SCH MG: 5 TABLET ORAL at 07:54

## 2019-03-21 RX ADMIN — HYDROXYZINE PAMOATE SCH MG: 25 CAPSULE ORAL at 09:53

## 2019-03-21 RX ADMIN — HYDROXYZINE PAMOATE SCH MG: 25 CAPSULE ORAL at 22:33

## 2019-03-21 RX ADMIN — HYDROXYZINE PAMOATE SCH MG: 25 CAPSULE ORAL at 18:07

## 2019-03-21 RX ADMIN — LORAZEPAM PRN MG: 2 INJECTION INTRAMUSCULAR; INTRAVENOUS at 07:54

## 2019-03-21 RX ADMIN — BUSPIRONE HYDROCHLORIDE SCH MG: 10 TABLET ORAL at 18:07

## 2019-03-21 RX ADMIN — GABAPENTIN SCH MG: 400 CAPSULE ORAL at 07:55

## 2019-03-21 RX ADMIN — ROPINIROLE HYDROCHLORIDE SCH MG: 1 TABLET, FILM COATED ORAL at 22:34

## 2019-03-21 RX ADMIN — AMLODIPINE BESYLATE SCH MG: 10 TABLET ORAL at 09:52

## 2019-03-21 RX ADMIN — DIAZEPAM SCH MG: 5 TABLET ORAL at 18:07

## 2019-03-21 RX ADMIN — Medication SCH MG: at 22:33

## 2019-03-21 RX ADMIN — ROPINIROLE HYDROCHLORIDE SCH MG: 1 TABLET, FILM COATED ORAL at 09:53

## 2019-03-21 NOTE — PDOC PROGRESS REPORT
Subjective


Progress Note for:: 03/21/19


Subjective:: 





58 years old  female patient with past medical history of depression 

and alcohol abuse brought by EMS for being suicide.  Her initial blood work 

shows markedly elevated lipase of 9352 she has also elevated liver chemistry no 

both values has remarkably improved.  Patient able to eat and tolerate well.  

Her BMP shows hypokalemia with potassium of 2.7 which is replaced now it is 3.6.

 Admission patient also found to have very foul-smelling diarrhea and her stool 

test is positive for C. difficile colitis and she has been managed with p.o. 

Flagyl and vancomycin.  Now her diarrhea has subsided now that she is 

constipated.  Patient evaluated by PT who recommended SNF placement.





3/19/2019-I had a long discussion with the patient's daughter and the she  re

ally thinks patient has hallucinations on daily bais/ having the suicidal 

thoughts and ideations she thinks she is unsafe for her mom  to be discharged 

she wants this psychiatric team to come and evaluate her mom but I spoke to the 

1 of the staff from the psychiatric team they told me that patient is cleared no

need for further follow-up and I was also notified that patient does not qualify

for inpatient psych rehab.  May be the hospital administration will help in 

arranging a meeting with the psychiatric team and the patient's daughter so they

can come to a understandable plan and management.  Nurse is telling me patient 

is constipated requesting suppositories and was to start on mechanical soft diet

agreed with the request.  Nurse called me just now to notify me that patient is 

agitated and anxious combative requesting soft restraints.





3/20/2019-no acute events in the last 24 hours.  Patient is afebrile.  Patient 

had fluctuation in the moods.  I spoke to patient's daughter Tammie she want her

mom to go to AA long-term care facility and she is not in a situation to take 

care of her mom at home.  But the patient is insisting that she is going home 

she is not going to a long-term care facility.  The daughter and psychiatric 

team going to meet today to evaluate the plan of care.





3/21/2019-patient still agitated and confused having the occasional 

hallucinations.  Patient is in restraints.  Patient is to be off restraints at 

least for 24 hours for the nursing home to accept the patient.  So far it is not

happening.  No acute events in the last 24 hours.  Patient is afebrile.


Reason For Visit: 


PANCREATITIS, ETOH INTOXICATION








Physical Exam


Vital Signs: 


                                        











Temp Pulse Resp BP Pulse Ox


 


 97.7 F   82   22 H  116/86 H  95 


 


 03/21/19 11:30  03/21/19 11:30  03/21/19 11:30  03/21/19 11:30  03/21/19 11:30








                                 Intake & Output











 03/20/19 03/21/19 03/22/19





 06:59 06:59 06:59


 


Intake Total 1001  


 


Output Total 1705 1075 


 


Balance -704 -1075 


 


Weight 68.6 kg 66.9 kg 











General appearance: PRESENT: mild distress, well-developed


Head exam: PRESENT: atraumatic


Eye exam: PRESENT: PERRLA


Neck exam: ABSENT: carotid bruit, JVD, lymphadenopathy, thyromegaly


Respiratory exam: PRESENT: clear to auscultation hill.  ABSENT: rales, rhonchi, 

wheezes


GI/Abdominal exam: PRESENT: normal bowel sounds, soft.  ABSENT: distended, 

guarding, mass, organolmegaly, rebound, tenderness


Extremities exam: PRESENT: full ROM.  ABSENT: calf tenderness, clubbing, pedal 

edema


Neurological exam: PRESENT: alert, awake, oriented to person, oriented to place,

oriented to time, oriented to situation, CN II-XII grossly intact.  ABSENT: 

motor sensory deficit


Psychiatric exam: PRESENT: appropriate affect, normal mood.  ABSENT: homicidal 

ideation, suicidal ideation





Results


Laboratory Results: 


                                        





                                 03/20/19 08:10 





                                 03/20/19 05:51 





                                        











  03/08/19





  11:47


 


Troponin I  < 0.012











Impressions: 


                                        





Abdomen/Pelvis CT  03/08/19 11:42


IMPRESSION:  Fatty liver


Gallstones


Peripancreatic retroperitoneal inflammation, question pancreatitis


 








Cervical Spine CT  03/08/19 11:42


IMPRESSION:  CHRONIC DEGENERATIVE CHANGES.  NO ACUTE FINDINGS. Similar left 

thyroid nodule with calcifications.


 








Chest CT  03/08/19 11:42


IMPRESSION:  No acute changes


 








Head CT  03/08/19 11:42


IMPRESSION:  No acute intracranial findings.


EVIDENCE OF ACUTE STROKE: NO.


 














Assessment and Plan





- Diagnosis


(1) Intermittent altered mental status


Is this a current diagnosis for this admission?: Yes   


Plan: 








Most probably related to her underlying psychiatric disorder and alcohol 

withdrawal.


She is on Ativan and diazepam.





3/19/2019-patient was agitated and anxious today not cooperative and combative 

going to put her on a soft restraints.  Altered mental status probably secondary

to underlying psychiatric disorders.  Patient is presently on IV Ativan and po 

diazepam.  Patient's home medications are restarted today.





3/20/2019-patient has fluctuation in the mood most likely secondary to 

underlying psychiatric disorders psych is going to reevaluate her today and they

have plans to sit down and discuss the care with patient's daughter today.  The 

meantime will continue the present management.





3/21/2019-patient has intermittent altered mental status with hallucinations 

agitation and anxiety.  She is on soft restraints.  We will try to remove the 

restraints this morning but patient try to come out of the bed and if she is at 

high risk for fall.  Unfortunately we had to put the restraints back on.  

Psychiatric team is following the patient.








(2) Elevated liver chemistry


Is this a current diagnosis for this admission?: Yes   


Plan: 





03/19/19 14:34


Patient is admitted with elevated liver enzymes the gradually coming back to 

normal.  It is most likely secondary to alcoholic liver disease.





3/20/2019-patient was admitted with elevated liver enzymes the progressively 

coming towards normal.  Today AST is 39 slightly elevated, ALT is 25 normal, 

alkaline phosphatase is 160 normal.  Elevated liver enzymes probably acute 

insult on the  liver secondary to alcohol abuse.





3/21/2019-elevated liver enzymes may be secondary to heavy alcohol use and toxic

injury to the liver.  Today LFTs are almost normal.








(3) Clostridium difficile colitis


Is this a current diagnosis for this admission?: Yes   


Plan: 





03/19/19 14:35


Patient was admitted with C. difficile colitis and a severe diarrhea stool 

culture is positive for C. difficile now she is severely constipated to start 

her on Dulcolax suppositories p.o. vancomycin was discontinued.





3/20/2019-patient was admitted with C. difficile colitis.  C. difficile was 

positive.  Diarrhea resolved.  She has not had any bowel movement for the last 2

days.  She still on contact isolation until repeat stool cultures are negative 

for C. difficile.  She is off p.o. vancomycin.





3/21/2019 patient is still on contact isolation is a C. difficile positive 

having the diarrhea now she is severely constipated we tried suppositories which

had a lax is not helping I am going to put her on mag citrate from today.  If 

this C. difficile is negative we will discontinue isolation.








(4) Acute pancreatitis


Qualifiers: 


   Pancreatitis type: alcohol induced 


Is this a current diagnosis for this admission?: Yes   


Plan: 





03/19/19 14:36


3/19/2019 patient is admitted with acute pancreatitis most likely secondary to 

chronic alcohol abuse.





3/20/2019-patient was admitted with acute on chronic pancreatitis most likely 

secondary to chronic alcohol abuse.  Latest lipase is 279.





2/21/2019 patient was admitted with acute on chronic pancreatitis most likely 

secondary to chronic alcohol use.  Denies any abdominal pains.  Lipase levels 

are back to normal.








(5) Suicide attempt


Is this a current diagnosis for this admission?: Yes   


Plan: 





03/19/19 14:36


3/19/2019 patient admitted with suicidal ideation and attempt patient is cleared

by psychiatric team.





3/20/2019-patient is still having the mood swings and hallucinations.  Denies 

any suicidal thoughts and ideations.





3/21/2019 -patient still having the episodes of agitation anxiety and h

allucinations.  Psychiatric team is following the patient.








- Time


Time Spent with patient: 15-24 minutes


Medications reviewed and adjusted accordingly: Yes


Anticipated discharge: SNF

## 2019-03-22 LAB
ADD MANUAL DIFF: NO
ALBUMIN SERPL-MCNC: 2.6 G/DL (ref 3.5–5)
ALP SERPL-CCNC: 68 U/L (ref 38–126)
ALT SERPL-CCNC: 27 U/L (ref 9–52)
ANION GAP SERPL CALC-SCNC: 5 MMOL/L (ref 5–19)
AST SERPL-CCNC: 32 U/L (ref 14–36)
BASOPHILS # BLD AUTO: 0 10^3/UL (ref 0–0.2)
BASOPHILS NFR BLD AUTO: 0.3 % (ref 0–2)
BILIRUB DIRECT SERPL-MCNC: 0.3 MG/DL (ref 0–0.4)
BILIRUB SERPL-MCNC: 0.6 MG/DL (ref 0.2–1.3)
BUN SERPL-MCNC: 18 MG/DL (ref 7–20)
CALCIUM: 10 MG/DL (ref 8.4–10.2)
CHLORIDE SERPL-SCNC: 116 MMOL/L (ref 98–107)
CO2 SERPL-SCNC: 21 MMOL/L (ref 22–30)
EOSINOPHIL # BLD AUTO: 0.2 10^3/UL (ref 0–0.6)
EOSINOPHIL NFR BLD AUTO: 2.8 % (ref 0–6)
ERYTHROCYTE [DISTWIDTH] IN BLOOD BY AUTOMATED COUNT: 15.2 % (ref 11.5–14)
GLUCOSE SERPL-MCNC: 75 MG/DL (ref 75–110)
HCT VFR BLD CALC: 38 % (ref 36–47)
HGB BLD-MCNC: 12.9 G/DL (ref 12–15.5)
LYMPHOCYTES # BLD AUTO: 2.2 10^3/UL (ref 0.5–4.7)
LYMPHOCYTES NFR BLD AUTO: 30.5 % (ref 13–45)
MCH RBC QN AUTO: 30.9 PG (ref 27–33.4)
MCHC RBC AUTO-ENTMCNC: 34.1 G/DL (ref 32–36)
MCV RBC AUTO: 91 FL (ref 80–97)
MONOCYTES # BLD AUTO: 0.7 10^3/UL (ref 0.1–1.4)
MONOCYTES NFR BLD AUTO: 9.3 % (ref 3–13)
NEUTROPHILS # BLD AUTO: 4 10^3/UL (ref 1.7–8.2)
NEUTS SEG NFR BLD AUTO: 57.1 % (ref 42–78)
PLATELET # BLD: 447 10^3/UL (ref 150–450)
POTASSIUM SERPL-SCNC: 3.9 MMOL/L (ref 3.6–5)
PROT SERPL-MCNC: 5.7 G/DL (ref 6.3–8.2)
RBC # BLD AUTO: 4.19 10^6/UL (ref 3.72–5.28)
SODIUM SERPL-SCNC: 142.3 MMOL/L (ref 137–145)
TOTAL CELLS COUNTED % (AUTO): 100 %
WBC # BLD AUTO: 7.1 10^3/UL (ref 4–10.5)

## 2019-03-22 RX ADMIN — HYDROXYZINE PAMOATE SCH MG: 25 CAPSULE ORAL at 22:07

## 2019-03-22 RX ADMIN — GABAPENTIN SCH MG: 400 CAPSULE ORAL at 14:17

## 2019-03-22 RX ADMIN — GABAPENTIN SCH MG: 400 CAPSULE ORAL at 05:56

## 2019-03-22 RX ADMIN — HYDROXYZINE PAMOATE SCH MG: 25 CAPSULE ORAL at 10:15

## 2019-03-22 RX ADMIN — LORAZEPAM PRN MG: 2 INJECTION INTRAMUSCULAR; INTRAVENOUS at 06:18

## 2019-03-22 RX ADMIN — AMLODIPINE BESYLATE SCH MG: 10 TABLET ORAL at 10:15

## 2019-03-22 RX ADMIN — DIAZEPAM SCH MG: 5 TABLET ORAL at 05:56

## 2019-03-22 RX ADMIN — DIAZEPAM SCH: 5 TABLET ORAL at 23:52

## 2019-03-22 RX ADMIN — HEPARIN SODIUM SCH UNIT: 5000 INJECTION, SOLUTION INTRAVENOUS; SUBCUTANEOUS at 14:16

## 2019-03-22 RX ADMIN — GABAPENTIN SCH: 400 CAPSULE ORAL at 21:23

## 2019-03-22 RX ADMIN — PROPRANOLOL HYDROCHLORIDE SCH MG: 20 TABLET ORAL at 22:06

## 2019-03-22 RX ADMIN — Medication SCH MG: at 22:07

## 2019-03-22 RX ADMIN — PROPRANOLOL HYDROCHLORIDE SCH: 20 TABLET ORAL at 21:23

## 2019-03-22 RX ADMIN — BUSPIRONE HYDROCHLORIDE SCH MG: 10 TABLET ORAL at 17:46

## 2019-03-22 RX ADMIN — DIAZEPAM SCH MG: 5 TABLET ORAL at 14:16

## 2019-03-22 RX ADMIN — GABAPENTIN SCH MG: 400 CAPSULE ORAL at 22:07

## 2019-03-22 RX ADMIN — DIAZEPAM SCH MG: 5 TABLET ORAL at 17:50

## 2019-03-22 RX ADMIN — ROPINIROLE HYDROCHLORIDE SCH MG: 1 TABLET, FILM COATED ORAL at 10:15

## 2019-03-22 RX ADMIN — DIAZEPAM SCH: 5 TABLET ORAL at 01:42

## 2019-03-22 RX ADMIN — QUETIAPINE FUMARATE SCH MG: 25 TABLET, FILM COATED ORAL at 22:06

## 2019-03-22 RX ADMIN — HYDROXYZINE PAMOATE SCH MG: 25 CAPSULE ORAL at 17:45

## 2019-03-22 RX ADMIN — BENZTROPINE MESYLATE SCH MG: 1 TABLET ORAL at 10:15

## 2019-03-22 RX ADMIN — POLYETHYLENE GLYCOL 3350 SCH: 17 POWDER, FOR SOLUTION ORAL at 10:17

## 2019-03-22 RX ADMIN — NICOTINE SCH EACH: 21 PATCH, EXTENDED RELEASE TOPICAL at 10:15

## 2019-03-22 RX ADMIN — OLANZAPINE SCH MG: 5 TABLET, FILM COATED ORAL at 10:15

## 2019-03-22 RX ADMIN — Medication SCH ML: at 14:20

## 2019-03-22 RX ADMIN — ROPINIROLE HYDROCHLORIDE SCH: 1 TABLET, FILM COATED ORAL at 21:23

## 2019-03-22 RX ADMIN — HEPARIN SODIUM SCH: 5000 INJECTION, SOLUTION INTRAVENOUS; SUBCUTANEOUS at 21:26

## 2019-03-22 RX ADMIN — PROPRANOLOL HYDROCHLORIDE SCH MG: 20 TABLET ORAL at 10:15

## 2019-03-22 RX ADMIN — Medication SCH ML: at 21:24

## 2019-03-22 RX ADMIN — DULOXETINE SCH MG: 30 CAPSULE, DELAYED RELEASE ORAL at 17:45

## 2019-03-22 RX ADMIN — Medication SCH MG: at 05:56

## 2019-03-22 RX ADMIN — Medication SCH: at 21:24

## 2019-03-22 RX ADMIN — OLANZAPINE SCH MG: 5 TABLET, FILM COATED ORAL at 17:45

## 2019-03-22 RX ADMIN — BUSPIRONE HYDROCHLORIDE SCH MG: 10 TABLET ORAL at 10:15

## 2019-03-22 RX ADMIN — HYDROXYZINE PAMOATE SCH MG: 25 CAPSULE ORAL at 14:16

## 2019-03-22 RX ADMIN — ROPINIROLE HYDROCHLORIDE SCH MG: 1 TABLET, FILM COATED ORAL at 22:07

## 2019-03-22 RX ADMIN — DULOXETINE SCH MG: 30 CAPSULE, DELAYED RELEASE ORAL at 10:15

## 2019-03-22 RX ADMIN — HYDROXYZINE PAMOATE SCH: 25 CAPSULE ORAL at 21:24

## 2019-03-22 RX ADMIN — QUETIAPINE FUMARATE SCH: 25 TABLET, FILM COATED ORAL at 21:24

## 2019-03-22 RX ADMIN — Medication SCH ML: at 05:57

## 2019-03-22 RX ADMIN — HEPARIN SODIUM SCH UNIT: 5000 INJECTION, SOLUTION INTRAVENOUS; SUBCUTANEOUS at 05:57

## 2019-03-22 RX ADMIN — Medication SCH MG: at 14:19

## 2019-03-22 NOTE — PDOC PROGRESS REPORT
Subjective


Progress Note for:: 03/22/19


Subjective:: 





58 years old  female patient with past medical history of depression 

and alcohol abuse brought by EMS for being suicide.  Her initial blood work 

shows markedly elevated lipase of 9352 she has also elevated liver chemistry no 

both values has remarkably improved.  Patient able to eat and tolerate well.  

Her BMP shows hypokalemia with potassium of 2.7 which is replaced now it is 3.6.

 Admission patient also found to have very foul-smelling diarrhea and her stool 

test is positive for C. difficile colitis and she has been managed with p.o. 

Flagyl and vancomycin.  Now her diarrhea has subsided now that she is 

constipated.  Patient evaluated by PT who recommended SNF placement.





3/19/2019-I had a long discussion with the patient's daughter and the she  re

ally thinks patient has hallucinations on daily bais/ having the suicidal 

thoughts and ideations she thinks she is unsafe for her mom  to be discharged 

she wants this psychiatric team to come and evaluate her mom but I spoke to the 

1 of the staff from the psychiatric team they told me that patient is cleared no

need for further follow-up and I was also notified that patient does not qualify

for inpatient psych rehab.  May be the hospital administration will help in 

arranging a meeting with the psychiatric team and the patient's daughter so they

can come to a understandable plan and management.  Nurse is telling me patient 

is constipated requesting suppositories and was to start on mechanical soft diet

agreed with the request.  Nurse called me just now to notify me that patient is 

agitated and anxious combative requesting soft restraints.





3/20/2019-no acute events in the last 24 hours.  Patient is afebrile.  Patient 

had fluctuation in the moods.  I spoke to patient's daughter Tammie she want her

mom to go to AA long-term care facility and she is not in a situation to take 

care of her mom at home.  But the patient is insisting that she is going home 

she is not going to a long-term care facility.  The daughter and psychiatric 

team going to meet today to evaluate the plan of care.





3/21/2019-patient still agitated and confused having the occasional 

hallucinations.  Patient is in restraints.  Patient is to be off restraints at 

least for 24 hours for the nursing home to accept the patient.  So far it is not

happening.  No acute events in the last 24 hours.  Patient is afebrile.





3/22/2019-patient found to be hypotensive last night she was given 1 L of normal

saline bolus, bolus was given around 2 AM the creatinine is 1.6 3 in the morning

labs today.  Plan is to recheck the labs tomorrow.  She is still agitated 

confused and having the hallucinations on soft restraints.


Reason For Visit: 


PANCREATITIS, ETOH INTOXICATION








Physical Exam


Vital Signs: 


                                        











Temp Pulse Resp BP Pulse Ox


 


 97.8 F   85   17   133/69 H  92 


 


 03/22/19 07:43  03/22/19 07:43  03/22/19 07:43  03/22/19 07:43  03/22/19 07:43








                                 Intake & Output











 03/21/19 03/22/19 03/23/19





 06:59 06:59 06:59


 


Intake Total  1500 


 


Output Total 1075 600 


 


Balance -1075 900 


 


Weight 66.9 kg 66.7 kg 











General appearance: PRESENT: mild distress


Head exam: PRESENT: atraumatic


Eye exam: PRESENT: PERRLA


Neck exam: ABSENT: carotid bruit, JVD, lymphadenopathy, thyromegaly


Respiratory exam: PRESENT: decreased breath sounds


Cardiovascular exam: PRESENT: tachycardia


GI/Abdominal exam: PRESENT: normal bowel sounds, soft.  ABSENT: distended, guard

ing, mass, organolmegaly, rebound, tenderness


Extremities exam: PRESENT: full ROM.  ABSENT: calf tenderness, clubbing, pedal 

edema


Neurological exam: PRESENT: other - Patient was agitated and anxious unable to 

participate in neurological examination.


Psychiatric exam: PRESENT: agitated, anxious





Results


Laboratory Results: 


                                        





                                 03/22/19 06:37 





                                 03/22/19 06:37 





                                        











  03/22/19 03/22/19





  06:37 06:37


 


WBC  7.1 


 


RBC  4.19 


 


Hgb  12.9 


 


Hct  38.0 


 


MCV  91 


 


MCH  30.9 


 


MCHC  34.1 


 


RDW  15.2 H 


 


Plt Count  447 


 


Seg Neutrophils %  57.1 


 


Lymphocytes %  30.5 


 


Monocytes %  9.3 


 


Eosinophils %  2.8 


 


Basophils %  0.3 


 


Absolute Neutrophils  4.0 


 


Absolute Lymphocytes  2.2 


 


Absolute Monocytes  0.7 


 


Absolute Eosinophils  0.2 


 


Absolute Basophils  0.0 


 


Sodium   142.3


 


Potassium   3.9


 


Chloride   116 H


 


Carbon Dioxide   21 L


 


Anion Gap   5


 


BUN   18


 


Creatinine   1.63 H


 


Est GFR ( Amer)   39 L


 


Est GFR (Non-Af Amer)   33 L


 


Glucose   75


 


Calcium   10.0


 


Magnesium   1.9


 


Total Bilirubin   0.6


 


AST   32


 


ALT   27


 


Alkaline Phosphatase   68


 


Total Protein   5.7 L


 


Albumin   2.6 L








                                        











  03/08/19





  11:47


 


Troponin I  < 0.012











Impressions: 


                                        





Abdomen/Pelvis CT  03/08/19 11:42


IMPRESSION:  Fatty liver


Gallstones


Peripancreatic retroperitoneal inflammation, question pancreatitis


 








Cervical Spine CT  03/08/19 11:42


IMPRESSION:  CHRONIC DEGENERATIVE CHANGES.  NO ACUTE FINDINGS. Similar left 

thyroid nodule with calcifications.


 








Chest CT  03/08/19 11:42


IMPRESSION:  No acute changes


 








Head CT  03/08/19 11:42


IMPRESSION:  No acute intracranial findings.


EVIDENCE OF ACUTE STROKE: NO.


 














Assessment and Plan





- Diagnosis


(1) Intermittent altered mental status


Is this a current diagnosis for this admission?: Yes   


Plan: 








Most probably related to her underlying psychiatric disorder and alcohol 

withdrawal.


She is on Ativan and diazepam.





3/19/2019-patient was agitated and anxious today not cooperative and combative 

going to put her on a soft restraints.  Altered mental status probably secondary

to underlying psychiatric disorders.  Patient is presently on IV Ativan and po 

diazepam.  Patient's home medications are restarted today.





3/20/2019-patient has fluctuation in the mood most likely secondary to 

underlying psychiatric disorders psych is going to reevaluate her today and they

have plans to sit down and discuss the care with patient's daughter today.  The 

meantime will continue the present management.





3/21/2019-patient has intermittent altered mental status with hallucinations 

agitation and anxiety.  She is on soft restraints.  We will try to remove the 

restraints this morning but patient try to come out of the bed and if she is at 

high risk for fall.  Unfortunately we had to put the restraints back on.  

Psychiatric team is following the patient.








3/22/2019-patient has his altered mental status with hallucinations, agitation 

and anxiety most likely secondary to underlying psychiatric disorders.  Patient 

is on restraints.  Patient has a bed available at rehab but we are unable to 

take her off the restraints.  Psychiatric evaluation was done we will follow the

recommendations.








(2) Elevated liver chemistry


Is this a current diagnosis for this admission?: Yes   


Plan: 





03/19/19 14:34


Patient is admitted with elevated liver enzymes the gradually coming back to 

normal.  It is most likely secondary to alcoholic liver disease.





3/20/2019-patient was admitted with elevated liver enzymes the progressively 

coming towards normal.  Today AST is 39 slightly elevated, ALT is 25 normal, 

alkaline phosphatase is 160 normal.  Elevated liver enzymes probably acute 

insult on the  liver secondary to alcohol abuse.





3/21/2019-elevated liver enzymes may be secondary to heavy alcohol use and toxic

injury to the liver.  Today LFTs are almost normal.








3/22/2019-patient has elevated liver enzymes on admission most likely secondary 

to heavy alcohol use under toxic injury to the liver the LFTs are came back to 

normal now.  Patient came down to 279.








(3) Clostridium difficile colitis


Is this a current diagnosis for this admission?: Yes   


Plan: 





03/19/19 14:35


Patient was admitted with C. difficile colitis and a severe diarrhea stool 

culture is positive for C. difficile now she is severely constipated to start 

her on Dulcolax suppositories p.o. vancomycin was discontinued.





3/20/2019-patient was admitted with C. difficile colitis.  C. difficile was 

positive.  Diarrhea resolved.  She has not had any bowel movement for the last 2

days.  She still on contact isolation until repeat stool cultures are negative 

for C. difficile.  She is off p.o. vancomycin.





3/21/2019 patient is still on contact isolation is a C. difficile positive 

having the diarrhea now she is severely constipated we tried suppositories which

had a lax is not helping I am going to put her on mag citrate from today.  If 

this C. difficile is negative we will discontinue isolation.





3/22/2019-on admission stool was positive for C. difficile she received p.o. 

vancomycin for the next several days she has constipation now she has a good 

bowel movements patient is still on contact isolation requested the nurse to 

send a stool for C. difficile again.








(4) Acute pancreatitis


Qualifiers: 


   Pancreatitis type: alcohol induced 


Is this a current diagnosis for this admission?: Yes   


Plan: 





03/19/19 14:36


3/19/2019 patient is admitted with acute pancreatitis most likely secondary to 

chronic alcohol abuse.





3/20/2019-patient was admitted with acute on chronic pancreatitis most likely 

secondary to chronic alcohol abuse.  Latest lipase is 279.





3/21/2019 patient was admitted with acute on chronic pancreatitis most likely 

secondary to chronic alcohol use.  Denies any abdominal pains.  Lipase levels 

are back to normal.





3/22/2019 patient was admitted with acute on chronic pancreatitis secondary to 

chronic alcohol abuse.  Lipase came down to 279.








(5) Suicide attempt


Is this a current diagnosis for this admission?: Yes   





(6) Acute kidney failure


Is this a current diagnosis for this admission?: Yes   


Plan: 


3/22/2019-patient's baseline creatinine is around 0.5 creatinine jumped to 1.63 

today.  Patient was hypotensive last night received 1 L of normal saline bolus 

acute kidney injury most likely secondary to prerenal causes plan is to recheck 

the labs again tomorrow.








- Time


Time Spent with patient: 15-24 minutes


Smoking Cessation Education: over 10 minutes


Medications reviewed and adjusted accordingly: Yes


Anticipated discharge: SNF

## 2019-03-23 LAB
ADD MANUAL DIFF: NO
ALBUMIN SERPL-MCNC: 3.6 G/DL (ref 3.5–5)
ALP SERPL-CCNC: 122 U/L (ref 38–126)
ALT SERPL-CCNC: 20 U/L (ref 9–52)
ANION GAP SERPL CALC-SCNC: 11 MMOL/L (ref 5–19)
AST SERPL-CCNC: 35 U/L (ref 14–36)
BASOPHILS # BLD AUTO: 0.1 10^3/UL (ref 0–0.2)
BASOPHILS NFR BLD AUTO: 0.8 % (ref 0–2)
BILIRUB DIRECT SERPL-MCNC: 0.2 MG/DL (ref 0–0.4)
BILIRUB SERPL-MCNC: 0.3 MG/DL (ref 0.2–1.3)
BUN SERPL-MCNC: 13 MG/DL (ref 7–20)
CALCIUM: 9.9 MG/DL (ref 8.4–10.2)
CHLORIDE SERPL-SCNC: 99 MMOL/L (ref 98–107)
CO2 SERPL-SCNC: 29 MMOL/L (ref 22–30)
EOSINOPHIL # BLD AUTO: 0.2 10^3/UL (ref 0–0.6)
EOSINOPHIL NFR BLD AUTO: 1.7 % (ref 0–6)
ERYTHROCYTE [DISTWIDTH] IN BLOOD BY AUTOMATED COUNT: 15.3 % (ref 11.5–14)
GLUCOSE SERPL-MCNC: 210 MG/DL (ref 75–110)
HCT VFR BLD CALC: 39.3 % (ref 36–47)
HGB BLD-MCNC: 13.3 G/DL (ref 12–15.5)
LYMPHOCYTES # BLD AUTO: 1.6 10^3/UL (ref 0.5–4.7)
LYMPHOCYTES NFR BLD AUTO: 16.1 % (ref 13–45)
MCH RBC QN AUTO: 30.6 PG (ref 27–33.4)
MCHC RBC AUTO-ENTMCNC: 33.8 G/DL (ref 32–36)
MCV RBC AUTO: 90 FL (ref 80–97)
MONOCYTES # BLD AUTO: 0.6 10^3/UL (ref 0.1–1.4)
MONOCYTES NFR BLD AUTO: 5.9 % (ref 3–13)
NEUTROPHILS # BLD AUTO: 7.6 10^3/UL (ref 1.7–8.2)
NEUTS SEG NFR BLD AUTO: 75.5 % (ref 42–78)
PLATELET # BLD: 476 10^3/UL (ref 150–450)
POTASSIUM SERPL-SCNC: 4.4 MMOL/L (ref 3.6–5)
PROT SERPL-MCNC: 7.2 G/DL (ref 6.3–8.2)
RBC # BLD AUTO: 4.35 10^6/UL (ref 3.72–5.28)
SODIUM SERPL-SCNC: 139 MMOL/L (ref 137–145)
TOTAL CELLS COUNTED % (AUTO): 100 %
WBC # BLD AUTO: 10 10^3/UL (ref 4–10.5)

## 2019-03-23 RX ADMIN — DULOXETINE SCH MG: 30 CAPSULE, DELAYED RELEASE ORAL at 17:18

## 2019-03-23 RX ADMIN — BENZTROPINE MESYLATE SCH MG: 1 TABLET ORAL at 11:17

## 2019-03-23 RX ADMIN — AMLODIPINE BESYLATE SCH MG: 10 TABLET ORAL at 11:16

## 2019-03-23 RX ADMIN — Medication SCH MG: at 05:46

## 2019-03-23 RX ADMIN — BUSPIRONE HYDROCHLORIDE SCH MG: 10 TABLET ORAL at 17:18

## 2019-03-23 RX ADMIN — HYDROXYZINE PAMOATE SCH MG: 25 CAPSULE ORAL at 11:16

## 2019-03-23 RX ADMIN — OLANZAPINE SCH MG: 5 TABLET, FILM COATED ORAL at 17:18

## 2019-03-23 RX ADMIN — BUSPIRONE HYDROCHLORIDE SCH MG: 10 TABLET ORAL at 11:16

## 2019-03-23 RX ADMIN — NICOTINE SCH: 21 PATCH, EXTENDED RELEASE TOPICAL at 11:17

## 2019-03-23 RX ADMIN — Medication SCH ML: at 05:46

## 2019-03-23 RX ADMIN — OLANZAPINE SCH MG: 5 TABLET, FILM COATED ORAL at 11:16

## 2019-03-23 RX ADMIN — PROPRANOLOL HYDROCHLORIDE SCH MG: 20 TABLET ORAL at 21:05

## 2019-03-23 RX ADMIN — Medication SCH ML: at 15:02

## 2019-03-23 RX ADMIN — HEPARIN SODIUM SCH UNIT: 5000 INJECTION, SOLUTION INTRAVENOUS; SUBCUTANEOUS at 21:05

## 2019-03-23 RX ADMIN — ROPINIROLE HYDROCHLORIDE SCH MG: 1 TABLET, FILM COATED ORAL at 11:16

## 2019-03-23 RX ADMIN — GABAPENTIN SCH MG: 400 CAPSULE ORAL at 15:00

## 2019-03-23 RX ADMIN — HEPARIN SODIUM SCH UNIT: 5000 INJECTION, SOLUTION INTRAVENOUS; SUBCUTANEOUS at 05:46

## 2019-03-23 RX ADMIN — Medication SCH ML: at 21:05

## 2019-03-23 RX ADMIN — Medication SCH MG: at 21:05

## 2019-03-23 RX ADMIN — QUETIAPINE FUMARATE SCH MG: 25 TABLET, FILM COATED ORAL at 21:05

## 2019-03-23 RX ADMIN — PROPRANOLOL HYDROCHLORIDE SCH MG: 20 TABLET ORAL at 11:14

## 2019-03-23 RX ADMIN — HYDROXYZINE PAMOATE SCH MG: 25 CAPSULE ORAL at 17:18

## 2019-03-23 RX ADMIN — HYDROXYZINE PAMOATE SCH MG: 25 CAPSULE ORAL at 21:04

## 2019-03-23 RX ADMIN — DULOXETINE SCH MG: 30 CAPSULE, DELAYED RELEASE ORAL at 11:13

## 2019-03-23 RX ADMIN — GABAPENTIN SCH MG: 400 CAPSULE ORAL at 05:46

## 2019-03-23 RX ADMIN — GABAPENTIN SCH MG: 400 CAPSULE ORAL at 21:05

## 2019-03-23 RX ADMIN — ROPINIROLE HYDROCHLORIDE SCH MG: 1 TABLET, FILM COATED ORAL at 21:04

## 2019-03-23 RX ADMIN — POLYETHYLENE GLYCOL 3350 SCH: 17 POWDER, FOR SOLUTION ORAL at 11:17

## 2019-03-23 RX ADMIN — DIAZEPAM SCH: 5 TABLET ORAL at 12:57

## 2019-03-23 RX ADMIN — HYDROXYZINE PAMOATE SCH MG: 25 CAPSULE ORAL at 15:00

## 2019-03-23 RX ADMIN — HEPARIN SODIUM SCH UNIT: 5000 INJECTION, SOLUTION INTRAVENOUS; SUBCUTANEOUS at 14:59

## 2019-03-23 RX ADMIN — Medication SCH MG: at 15:00

## 2019-03-23 RX ADMIN — DIAZEPAM SCH MG: 5 TABLET ORAL at 05:46

## 2019-03-23 NOTE — PDOC PROGRESS REPORT
Subjective


Progress Note for:: 03/23/19


Subjective:: 





58 years old  female patient with past medical history of depression 

and alcohol abuse brought by EMS for being suicide.  Her initial blood work 

shows markedly elevated lipase of 9352 she has also elevated liver chemistry no 

both values has remarkably improved.  Patient able to eat and tolerate well.  

Her BMP shows hypokalemia with potassium of 2.7 which is replaced now it is 3.6.

 Admission patient also found to have very foul-smelling diarrhea and her stool 

test is positive for C. difficile colitis and she has been managed with p.o. 

Flagyl and vancomycin.  Now her diarrhea has subsided now that she is 

constipated.  Patient evaluated by PT who recommended SNF placement.





3/19/2019-I had a long discussion with the patient's daughter and the she  re

ally thinks patient has hallucinations on daily bais/ having the suicidal 

thoughts and ideations she thinks she is unsafe for her mom  to be discharged 

she wants this psychiatric team to come and evaluate her mom but I spoke to the 

1 of the staff from the psychiatric team they told me that patient is cleared no

need for further follow-up and I was also notified that patient does not qualify

for inpatient psych rehab.  May be the hospital administration will help in 

arranging a meeting with the psychiatric team and the patient's daughter so they

can come to a understandable plan and management.  Nurse is telling me patient 

is constipated requesting suppositories and was to start on mechanical soft diet

agreed with the request.  Nurse called me just now to notify me that patient is 

agitated and anxious combative requesting soft restraints.





3/20/2019-no acute events in the last 24 hours.  Patient is afebrile.  Patient 

had fluctuation in the moods.  I spoke to patient's daughter Tammie she want her

mom to go to AA long-term care facility and she is not in a situation to take 

care of her mom at home.  But the patient is insisting that she is going home 

she is not going to a long-term care facility.  The daughter and psychiatric 

team going to meet today to evaluate the plan of care.





3/21/2019-patient still agitated and confused having the occasional 

hallucinations.  Patient is in restraints.  Patient is to be off restraints at 

least for 24 hours for the nursing home to accept the patient.  So far it is not

happening.  No acute events in the last 24 hours.  Patient is afebrile.





3/22/2019-patient found to be hypotensive last night she was given 1 L of normal

saline bolus, bolus was given around 2 AM the creatinine is 1.6 3 in the morning

labs today.  Plan is to recheck the labs tomorrow.  She is still agitated 

confused and having the hallucinations on soft restraints.





3/23/2019-no acute events in the last 24 hours.  Patient is afebrile.  She is 

off the restraints.  Patient's creatinine yesterday is 1.6 3 repeat creatinine 

today is 0.57 improved.  Comfortably in the bed not in distress.  Communicating 

reasonably denies any problems.


Reason For Visit: 


PANCREATITIS, ETOH INTOXICATION








Physical Exam


Vital Signs: 


                                        











Temp Pulse Resp BP Pulse Ox


 


 98.1 F   115 H  16   129/90 H  97 


 


 03/23/19 12:13  03/23/19 12:13  03/23/19 12:13  03/23/19 12:13  03/23/19 12:13








                                 Intake & Output











 03/22/19 03/23/19 03/24/19





 06:59 06:59 06:59


 


Intake Total 1500 120 


 


Output Total 600 0 


 


Balance 900 120 


 


Weight 66.7 kg 62.7 kg 











General appearance: PRESENT: no acute distress


Head exam: PRESENT: atraumatic


Eye exam: PRESENT: PERRLA


Neck exam: ABSENT: carotid bruit, JVD, lymphadenopathy, thyromegaly


Respiratory exam: PRESENT: decreased breath sounds


Cardiovascular exam: PRESENT: tachycardia


GI/Abdominal exam: PRESENT: normal bowel sounds, soft.  ABSENT: distended, 

guarding, mass, organolmegaly, rebound, tenderness


Extremities exam: PRESENT: full ROM.  ABSENT: calf tenderness, clubbing, pedal 

edema


Neurological exam: PRESENT: alert, awake, CN II-XII grossly intact.  ABSENT: 

motor sensory deficit


Psychiatric exam: PRESENT: appropriate affect, normal mood.  ABSENT: homicidal 

ideation, suicidal ideation





Results


Laboratory Results: 


                                        





                                 03/23/19 11:41 





                                 03/23/19 11:41 





                                        











  03/23/19 03/23/19





  11:41 11:41


 


WBC  10.0 


 


RBC  4.35 


 


Hgb  13.3 


 


Hct  39.3 


 


MCV  90 


 


MCH  30.6 


 


MCHC  33.8 


 


RDW  15.3 H 


 


Plt Count  476 H 


 


Seg Neutrophils %  75.5 


 


Lymphocytes %  16.1 


 


Monocytes %  5.9 


 


Eosinophils %  1.7 


 


Basophils %  0.8 


 


Absolute Neutrophils  7.6 


 


Absolute Lymphocytes  1.6 


 


Absolute Monocytes  0.6 


 


Absolute Eosinophils  0.2 


 


Absolute Basophils  0.1 


 


Sodium   139.0


 


Potassium   4.4


 


Chloride   99


 


Carbon Dioxide   29


 


Anion Gap   11


 


BUN   13


 


Creatinine   0.57


 


Est GFR ( Amer)   > 60


 


Est GFR (Non-Af Amer)   > 60


 


Glucose   210 H


 


Calcium   9.9


 


Magnesium   2.1


 


Total Bilirubin   0.3


 


AST   35


 


ALT   20


 


Alkaline Phosphatase   122


 


Total Protein   7.2


 


Albumin   3.6








                                        











  03/08/19





  11:47


 


Troponin I  < 0.012











Impressions: 


                                        





Abdomen/Pelvis CT  03/08/19 11:42


IMPRESSION:  Fatty liver


Gallstones


Peripancreatic retroperitoneal inflammation, question pancreatitis


 








Cervical Spine CT  03/08/19 11:42


IMPRESSION:  CHRONIC DEGENERATIVE CHANGES.  NO ACUTE FINDINGS. Similar left 

thyroid nodule with calcifications.


 








Chest CT  03/08/19 11:42


IMPRESSION:  No acute changes


 








Head CT  03/08/19 11:42


IMPRESSION:  No acute intracranial findings.


EVIDENCE OF ACUTE STROKE: NO.


 














Assessment and Plan





- Diagnosis


(1) Intermittent altered mental status


Is this a current diagnosis for this admission?: Yes   


Plan: 








Most probably related to her underlying psychiatric disorder and alcohol 

withdrawal.


She is on Ativan and diazepam.





3/19/2019-patient was agitated and anxious today not cooperative and combative 

going to put her on a soft restraints.  Altered mental status probably secondary

to underlying psychiatric disorders.  Patient is presently on IV Ativan and po 

diazepam.  Patient's home medications are restarted today.





3/20/2019-patient has fluctuation in the mood most likely secondary to 

underlying psychiatric disorders psych is going to reevaluate her today and they

have plans to sit down and discuss the care with patient's daughter today.  The 

meantime will continue the present management.





3/21/2019-patient has intermittent altered mental status with hallucinations 

agitation and anxiety.  She is on soft restraints.  We will try to remove the 

restraints this morning but patient try to come out of the bed and if she is at 

high risk for fall.  Unfortunately we had to put the restraints back on.  

Psychiatric team is following the patient.








3/22/2019-patient has his altered mental status with hallucinations, agitation 

and anxiety most likely secondary to underlying psychiatric disorders.  Patient 

is on restraints.  Patient has a bed available at rehab but we are unable to 

take her off the restraints.  Psychiatric evaluation was done we will follow the

recommendations.





3/23/2019-patient still having the episodes of change in mental status 

associated with hallucinations agitation and anxiety she is off the restraints 

for now.








(2) Elevated liver chemistry


Is this a current diagnosis for this admission?: Yes   


Plan: 





03/19/19 14:34


Patient is admitted with elevated liver enzymes the gradually coming back to 

normal.  It is most likely secondary to alcoholic liver disease.





3/20/2019-patient was admitted with elevated liver enzymes the progressively 

coming towards normal.  Today AST is 39 slightly elevated, ALT is 25 normal, 

alkaline phosphatase is 160 normal.  Elevated liver enzymes probably acute 

insult on the  liver secondary to alcohol abuse.





3/21/2019-elevated liver enzymes may be secondary to heavy alcohol use and toxic

injury to the liver.  Today LFTs are almost normal.








3/22/2019-patient has elevated liver enzymes on admission most likely secondary 

to heavy alcohol use under toxic injury to the liver the LFTs are came back to 

normal now.  Patient came down to 279.








3/23/2019-LFTs came back to normal.  High LFTs initially most likely secondary 

to toxic liver injury due to alcohol abuse.








(3) Clostridium difficile colitis


Is this a current diagnosis for this admission?: Yes   


Plan: 





03/19/19 14:35


Patient was admitted with C. difficile colitis and a severe diarrhea stool 

culture is positive for C. difficile now she is severely constipated to start 

her on Dulcolax suppositories p.o. vancomycin was discontinued.





3/20/2019-patient was admitted with C. difficile colitis.  C. difficile was p

ositive.  Diarrhea resolved.  She has not had any bowel movement for the last 2 

days.  She still on contact isolation until repeat stool cultures are negative 

for C. difficile.  She is off p.o. vancomycin.





3/21/2019 patient is still on contact isolation is a C. difficile positive 

having the diarrhea now she is severely constipated we tried suppositories which

had a lax is not helping I am going to put her on mag citrate from today.  If 

this C. difficile is negative we will discontinue isolation.





3/22/2019-on admission stool was positive for C. difficile she received p.o. 

vancomycin for the next several days she has constipation now she has a good 

bowel movements patient is still on contact isolation requested the nurse to 

send a stool for C. difficile again.





3/23/2019 patient was admitted with diarrhea and C. difficile positive she was 

treated with p.o. vancomycin repeat serologies negative for C. difficile.








(4) Acute pancreatitis


Qualifiers: 


   Pancreatitis type: alcohol induced 


Is this a current diagnosis for this admission?: Yes   


Plan: 





03/19/19 14:36


3/19/2019 patient is admitted with acute pancreatitis most likely secondary to 

chronic alcohol abuse.





3/20/2019-patient was admitted with acute on chronic pancreatitis most likely 

secondary to chronic alcohol abuse.  Latest lipase is 279.





3/21/2019 patient was admitted with acute on chronic pancreatitis most likely 

secondary to chronic alcohol use.  Denies any abdominal pains.  Lipase levels 

are back to normal.





3/22/2019 patient was admitted with acute on chronic pancreatitis secondary to 

chronic alcohol abuse.  Lipase came down to 279.





3/23/2019-patient was admitted with acute on chronic pancreatitis secondary to 

alcohol abuse.  Lipase came down to normal.








(5) Suicide attempt


Is this a current diagnosis for this admission?: Yes   





(6) Acute kidney failure


Is this a current diagnosis for this admission?: Yes   


Plan: 


3/22/2019-patient's baseline creatinine is around 0.5 creatinine jumped to 1.63 

today.  Patient was hypotensive last night received 1 L of normal saline bolus 

acute kidney injury most likely secondary to prerenal causes plan is to recheck 

the labs again tomorrow.





23 2019-since baseline creatinine is around 0.5 it is jumped to 1.63 yesterday 

after giving 1 L of IV fluid bolus creatinine came down to 0.57 today.  Acute 

kidney injury most likely secondary to prerenal causes including dehydration 

resolved.








- Time


Time Spent with patient: 15-24 minutes


Medications reviewed and adjusted accordingly: Yes


Anticipated discharge: SNF

## 2019-03-24 LAB
ADD MANUAL DIFF: NO
ALBUMIN SERPL-MCNC: 3.6 G/DL (ref 3.5–5)
ALP SERPL-CCNC: 109 U/L (ref 38–126)
ALT SERPL-CCNC: 17 U/L (ref 9–52)
ANION GAP SERPL CALC-SCNC: 12 MMOL/L (ref 5–19)
AST SERPL-CCNC: 25 U/L (ref 14–36)
BASOPHILS # BLD AUTO: 0 10^3/UL (ref 0–0.2)
BASOPHILS NFR BLD AUTO: 0.3 % (ref 0–2)
BILIRUB DIRECT SERPL-MCNC: 0.3 MG/DL (ref 0–0.4)
BILIRUB SERPL-MCNC: 0.3 MG/DL (ref 0.2–1.3)
BUN SERPL-MCNC: 13 MG/DL (ref 7–20)
CALCIUM: 9.9 MG/DL (ref 8.4–10.2)
CHLORIDE SERPL-SCNC: 98 MMOL/L (ref 98–107)
CO2 SERPL-SCNC: 29 MMOL/L (ref 22–30)
EOSINOPHIL # BLD AUTO: 0.3 10^3/UL (ref 0–0.6)
EOSINOPHIL NFR BLD AUTO: 4.3 % (ref 0–6)
ERYTHROCYTE [DISTWIDTH] IN BLOOD BY AUTOMATED COUNT: 15 % (ref 11.5–14)
GLUCOSE SERPL-MCNC: 195 MG/DL (ref 75–110)
HCT VFR BLD CALC: 38.8 % (ref 36–47)
HGB BLD-MCNC: 13.1 G/DL (ref 12–15.5)
LYMPHOCYTES # BLD AUTO: 2.2 10^3/UL (ref 0.5–4.7)
LYMPHOCYTES NFR BLD AUTO: 31.9 % (ref 13–45)
MCH RBC QN AUTO: 30.7 PG (ref 27–33.4)
MCHC RBC AUTO-ENTMCNC: 33.9 G/DL (ref 32–36)
MCV RBC AUTO: 91 FL (ref 80–97)
MONOCYTES # BLD AUTO: 0.3 10^3/UL (ref 0.1–1.4)
MONOCYTES NFR BLD AUTO: 5 % (ref 3–13)
NEUTROPHILS # BLD AUTO: 4 10^3/UL (ref 1.7–8.2)
NEUTS SEG NFR BLD AUTO: 58.5 % (ref 42–78)
PLATELET # BLD: 422 10^3/UL (ref 150–450)
POTASSIUM SERPL-SCNC: 4.3 MMOL/L (ref 3.6–5)
PROT SERPL-MCNC: 6.8 G/DL (ref 6.3–8.2)
RBC # BLD AUTO: 4.29 10^6/UL (ref 3.72–5.28)
SODIUM SERPL-SCNC: 139.1 MMOL/L (ref 137–145)
TOTAL CELLS COUNTED % (AUTO): 100 %
WBC # BLD AUTO: 6.9 10^3/UL (ref 4–10.5)

## 2019-03-24 RX ADMIN — GABAPENTIN SCH MG: 400 CAPSULE ORAL at 21:04

## 2019-03-24 RX ADMIN — QUETIAPINE FUMARATE SCH MG: 25 TABLET, FILM COATED ORAL at 21:04

## 2019-03-24 RX ADMIN — PROPRANOLOL HYDROCHLORIDE SCH MG: 20 TABLET ORAL at 21:04

## 2019-03-24 RX ADMIN — ROPINIROLE HYDROCHLORIDE SCH MG: 1 TABLET, FILM COATED ORAL at 21:04

## 2019-03-24 RX ADMIN — DULOXETINE SCH MG: 30 CAPSULE, DELAYED RELEASE ORAL at 11:20

## 2019-03-24 RX ADMIN — ROPINIROLE HYDROCHLORIDE SCH MG: 1 TABLET, FILM COATED ORAL at 11:19

## 2019-03-24 RX ADMIN — PROPRANOLOL HYDROCHLORIDE SCH MG: 20 TABLET ORAL at 11:19

## 2019-03-24 RX ADMIN — Medication SCH ML: at 16:22

## 2019-03-24 RX ADMIN — GABAPENTIN SCH MG: 400 CAPSULE ORAL at 16:22

## 2019-03-24 RX ADMIN — HYDROXYZINE PAMOATE SCH MG: 25 CAPSULE ORAL at 18:00

## 2019-03-24 RX ADMIN — OLANZAPINE SCH MG: 5 TABLET, FILM COATED ORAL at 11:20

## 2019-03-24 RX ADMIN — BUSPIRONE HYDROCHLORIDE SCH MG: 10 TABLET ORAL at 18:00

## 2019-03-24 RX ADMIN — HYDROXYZINE PAMOATE SCH MG: 25 CAPSULE ORAL at 16:22

## 2019-03-24 RX ADMIN — HEPARIN SODIUM SCH UNIT: 5000 INJECTION, SOLUTION INTRAVENOUS; SUBCUTANEOUS at 05:05

## 2019-03-24 RX ADMIN — LORAZEPAM PRN MG: 2 INJECTION INTRAMUSCULAR; INTRAVENOUS at 02:27

## 2019-03-24 RX ADMIN — HYDROXYZINE PAMOATE SCH MG: 25 CAPSULE ORAL at 11:20

## 2019-03-24 RX ADMIN — BENZTROPINE MESYLATE SCH MG: 1 TABLET ORAL at 11:19

## 2019-03-24 RX ADMIN — DULOXETINE SCH MG: 30 CAPSULE, DELAYED RELEASE ORAL at 17:59

## 2019-03-24 RX ADMIN — Medication SCH MG: at 16:21

## 2019-03-24 RX ADMIN — HEPARIN SODIUM SCH UNIT: 5000 INJECTION, SOLUTION INTRAVENOUS; SUBCUTANEOUS at 21:04

## 2019-03-24 RX ADMIN — AMLODIPINE BESYLATE SCH MG: 10 TABLET ORAL at 11:19

## 2019-03-24 RX ADMIN — HEPARIN SODIUM SCH UNIT: 5000 INJECTION, SOLUTION INTRAVENOUS; SUBCUTANEOUS at 16:22

## 2019-03-24 RX ADMIN — Medication SCH ML: at 21:04

## 2019-03-24 RX ADMIN — NICOTINE SCH: 21 PATCH, EXTENDED RELEASE TOPICAL at 10:14

## 2019-03-24 RX ADMIN — BUSPIRONE HYDROCHLORIDE SCH MG: 10 TABLET ORAL at 11:19

## 2019-03-24 RX ADMIN — GABAPENTIN SCH MG: 400 CAPSULE ORAL at 05:05

## 2019-03-24 RX ADMIN — Medication SCH MG: at 21:03

## 2019-03-24 RX ADMIN — POLYETHYLENE GLYCOL 3350 SCH: 17 POWDER, FOR SOLUTION ORAL at 10:14

## 2019-03-24 RX ADMIN — OLANZAPINE SCH MG: 5 TABLET, FILM COATED ORAL at 18:00

## 2019-03-24 RX ADMIN — Medication SCH MG: at 05:05

## 2019-03-24 RX ADMIN — Medication SCH ML: at 05:11

## 2019-03-24 RX ADMIN — LORAZEPAM PRN MG: 2 INJECTION INTRAMUSCULAR; INTRAVENOUS at 20:43

## 2019-03-24 RX ADMIN — HYDROXYZINE PAMOATE SCH MG: 25 CAPSULE ORAL at 21:04

## 2019-03-24 NOTE — PDOC PROGRESS REPORT
Subjective


Progress Note for:: 03/24/19


Subjective:: 





58 years old  female patient with past medical history of depression 

and alcohol abuse brought by EMS for being suicide.  Her initial blood work 

shows markedly elevated lipase of 9352 she has also elevated liver chemistry no 

both values has remarkably improved.  Patient able to eat and tolerate well.  

Her BMP shows hypokalemia with potassium of 2.7 which is replaced now it is 3.6.

 Admission patient also found to have very foul-smelling diarrhea and her stool 

test is positive for C. difficile colitis and she has been managed with p.o. 

Flagyl and vancomycin.  Now her diarrhea has subsided now that she is 

constipated.  Patient evaluated by PT who recommended SNF placement.





3/19/2019-I had a long discussion with the patient's daughter and the she  re

ally thinks patient has hallucinations on daily bais/ having the suicidal 

thoughts and ideations she thinks she is unsafe for her mom  to be discharged 

she wants this psychiatric team to come and evaluate her mom but I spoke to the 

1 of the staff from the psychiatric team they told me that patient is cleared no

need for further follow-up and I was also notified that patient does not qualify

for inpatient psych rehab.  May be the hospital administration will help in 

arranging a meeting with the psychiatric team and the patient's daughter so they

can come to a understandable plan and management.  Nurse is telling me patient 

is constipated requesting suppositories and was to start on mechanical soft diet

agreed with the request.  Nurse called me just now to notify me that patient is 

agitated and anxious combative requesting soft restraints.





3/20/2019-no acute events in the last 24 hours.  Patient is afebrile.  Patient 

had fluctuation in the moods.  I spoke to patient's daughter Tammie she want her

mom to go to AA long-term care facility and she is not in a situation to take 

care of her mom at home.  But the patient is insisting that she is going home 

she is not going to a long-term care facility.  The daughter and psychiatric 

team going to meet today to evaluate the plan of care.





3/21/2019-patient still agitated and confused having the occasional 

hallucinations.  Patient is in restraints.  Patient is to be off restraints at 

least for 24 hours for the nursing home to accept the patient.  So far it is not

happening.  No acute events in the last 24 hours.  Patient is afebrile.





3/22/2019-patient found to be hypotensive last night she was given 1 L of normal

saline bolus, bolus was given around 2 AM the creatinine is 1.6 3 in the morning

labs today.  Plan is to recheck the labs tomorrow.  She is still agitated 

confused and having the hallucinations on soft restraints.





3/23/2019-no acute events in the last 24 hours.  Patient is afebrile.  She is 

off the restraints.  Patient's creatinine yesterday is 1.6 3 repeat creatinine 

today is 0.57 improved.  Comfortably in the bed not in distress.  Communicating 

reasonably denies any problems.





3/24/2019-no acute events in the last 24 hours.  Patient is off the restraints 

for more than 24 hours.  Comfortably in the bed sleeping well.  Afebrile.


Reason For Visit: 


PANCREATITIS, ETOH INTOXICATION








Physical Exam


Vital Signs: 


                                        











Temp Pulse Resp BP Pulse Ox


 


 97.5 F   82   18   148/83 H  97 


 


 03/24/19 08:50  03/24/19 08:50  03/24/19 08:50  03/24/19 08:50  03/24/19 08:50








                                 Intake & Output











 03/23/19 03/24/19 03/25/19





 06:59 06:59 06:59


 


Intake Total 120 1207 


 


Output Total 0 650 


 


Balance 120 557 


 


Weight 62.7 kg 62.9 kg 











General appearance: PRESENT: no acute distress


Head exam: PRESENT: atraumatic


Eye exam: PRESENT: PERRLA


Mouth exam: PRESENT: moist, tongue midline


Neck exam: ABSENT: carotid bruit, JVD, lymphadenopathy, thyromegaly


Respiratory exam: PRESENT: decreased breath sounds


Cardiovascular exam: PRESENT: tachycardia


GI/Abdominal exam: PRESENT: normal bowel sounds, soft.  ABSENT: distended, 

guarding, mass, organolmegaly, rebound, tenderness


Neurological exam: PRESENT: alert, awake, oriented to person, oriented to place,

oriented to time, oriented to situation, CN II-XII grossly intact.  ABSENT: 

motor sensory deficit


Psychiatric exam: PRESENT: appropriate affect, normal mood.  ABSENT: homicidal 

ideation, suicidal ideation





Results


Laboratory Results: 


                                        





                                 03/24/19 04:28 





                                 03/24/19 04:28 





                                        











  03/23/19 03/23/19 03/24/19





  11:41 11:41 04:28


 


WBC  10.0   6.9


 


RBC  4.35   4.29


 


Hgb  13.3   13.1


 


Hct  39.3   38.8


 


MCV  90   91


 


MCH  30.6   30.7


 


MCHC  33.8   33.9


 


RDW  15.3 H   15.0 H


 


Plt Count  476 H   422


 


Seg Neutrophils %  75.5   58.5


 


Lymphocytes %  16.1   31.9


 


Monocytes %  5.9   5.0


 


Eosinophils %  1.7   4.3


 


Basophils %  0.8   0.3


 


Absolute Neutrophils  7.6   4.0


 


Absolute Lymphocytes  1.6   2.2


 


Absolute Monocytes  0.6   0.3


 


Absolute Eosinophils  0.2   0.3


 


Absolute Basophils  0.1   0.0


 


Sodium   139.0 


 


Potassium   4.4 


 


Chloride   99 


 


Carbon Dioxide   29 


 


Anion Gap   11 


 


BUN   13 


 


Creatinine   0.57 


 


Est GFR ( Amer)   > 60 


 


Est GFR (Non-Af Amer)   > 60 


 


Glucose   210 H 


 


Calcium   9.9 


 


Magnesium   2.1 


 


Total Bilirubin   0.3 


 


AST   35 


 


ALT   20 


 


Alkaline Phosphatase   122 


 


Total Protein   7.2 


 


Albumin   3.6 














  03/24/19





  04:28


 


WBC 


 


RBC 


 


Hgb 


 


Hct 


 


MCV 


 


MCH 


 


MCHC 


 


RDW 


 


Plt Count 


 


Seg Neutrophils % 


 


Lymphocytes % 


 


Monocytes % 


 


Eosinophils % 


 


Basophils % 


 


Absolute Neutrophils 


 


Absolute Lymphocytes 


 


Absolute Monocytes 


 


Absolute Eosinophils 


 


Absolute Basophils 


 


Sodium  139.1


 


Potassium  4.3


 


Chloride  98


 


Carbon Dioxide  29


 


Anion Gap  12


 


BUN  13


 


Creatinine  0.59


 


Est GFR ( Amer)  > 60


 


Est GFR (Non-Af Amer)  > 60


 


Glucose  195 H


 


Calcium  9.9


 


Magnesium  2.0


 


Total Bilirubin  0.3


 


AST  25


 


ALT  17


 


Alkaline Phosphatase  109


 


Total Protein  6.8


 


Albumin  3.6








                                        











  03/08/19





  11:47


 


Troponin I  < 0.012











Impressions: 


                                        





Abdomen/Pelvis CT  03/08/19 11:42


IMPRESSION:  Fatty liver


Gallstones


Peripancreatic retroperitoneal inflammation, question pancreatitis


 








Cervical Spine CT  03/08/19 11:42


IMPRESSION:  CHRONIC DEGENERATIVE CHANGES.  NO ACUTE FINDINGS. Similar left 

thyroid nodule with calcifications.


 








Chest CT  03/08/19 11:42


IMPRESSION:  No acute changes


 








Head CT  03/08/19 11:42


IMPRESSION:  No acute intracranial findings.


EVIDENCE OF ACUTE STROKE: NO.


 














Assessment and Plan





- Diagnosis


(1) Intermittent altered mental status


Is this a current diagnosis for this admission?: Yes   


Plan: 








Most probably related to her underlying psychiatric disorder and alcohol 

withdrawal.


She is on Ativan and diazepam.





3/19/2019-patient was agitated and anxious today not cooperative and combative 

going to put her on a soft restraints.  Altered mental status probably secondary

to underlying psychiatric disorders.  Patient is presently on IV Ativan and po 

diazepam.  Patient's home medications are restarted today.





3/20/2019-patient has fluctuation in the mood most likely secondary to 

underlying psychiatric disorders psych is going to reevaluate her today and they

have plans to sit down and discuss the care with patient's daughter today.  The 

meantime will continue the present management.





3/21/2019-patient has intermittent altered mental status with hallucinations 

agitation and anxiety.  She is on soft restraints.  We will try to remove the 

restraints this morning but patient try to come out of the bed and if she is at 

high risk for fall.  Unfortunately we had to put the restraints back on.  

Psychiatric team is following the patient.








3/22/2019-patient has his altered mental status with hallucinations, agitation 

and anxiety most likely secondary to underlying psychiatric disorders.  Patient 

is on restraints.  Patient has a bed available at rehab but we are unable to 

take her off the restraints.  Psychiatric evaluation was done we will follow the

recommendations.





3/23/2019-patient still having the episodes of change in mental status 

associated with hallucinations agitation and anxiety she is off the restraints 

for now.





3/24/2019-patient is comfortable in the sleep.  She is more comfortable since 

yesterday less agitated since yesterday.  Plan is to discontinue diazepam 

discontinue  hydroxyzine as needed.








(2) Elevated liver chemistry


Is this a current diagnosis for this admission?: Yes   


Plan: 





03/19/19 14:34


Patient is admitted with elevated liver enzymes the gradually coming back to 

normal.  It is most likely secondary to alcoholic liver disease.





3/20/2019-patient was admitted with elevated liver enzymes the progressively 

coming towards normal.  Today AST is 39 slightly elevated, ALT is 25 normal, 

alkaline phosphatase is 160 normal.  Elevated liver enzymes probably acute 

insult on the  liver secondary to alcohol abuse.





3/21/2019-elevated liver enzymes may be secondary to heavy alcohol use and toxic

injury to the liver.  Today LFTs are almost normal.








3/22/2019-patient has elevated liver enzymes on admission most likely secondary 

to heavy alcohol use under toxic injury to the liver the LFTs are came back to 

normal now.  Patient came down to 279.








3/23/2019-LFTs came back to normal.  High LFTs initially most likely secondary 

to toxic liver injury due to alcohol abuse.





3/24/2019-LFTs are normal transaminitis is resolved.








(3) Clostridium difficile colitis


Is this a current diagnosis for this admission?: Yes   


Plan: 





03/19/19 14:35


Patient was admitted with C. difficile colitis and a severe diarrhea stool 

culture is positive for C. difficile now she is severely constipated to start 

her on Dulcolax suppositories p.o. vancomycin was discontinued.





3/20/2019-patient was admitted with C. difficile colitis.  C. difficile was 

positive.  Diarrhea resolved.  She has not had any bowel movement for the last 2

days.  She still on contact isolation until repeat stool cultures are negative 

for C. difficile.  She is off p.o. vancomycin.





3/21/2019 patient is still on contact isolation is a C. difficile positive 

having the diarrhea now she is severely constipated we tried suppositories which

had a lax is not helping I am going to put her on mag citrate from today.  If 

this C. difficile is negative we will discontinue isolation.





3/22/2019-on admission stool was positive for C. difficile she received p.o. 

vancomycin for the next several days she has constipation now she has a good 

bowel movements patient is still on contact isolation requested the nurse to 

send a stool for C. difficile again.





3/23/2019 patient was admitted with diarrhea and C. difficile positive she was 

treated with p.o. vancomycin repeat serologies negative for C. difficile.





3/24/2019-patient came in with the diarrhea and C. difficile positive follow-up 

C. difficile serology is negative.  pt is not  on isolation anymore.








(4) Acute pancreatitis


Qualifiers: 


   Pancreatitis type: alcohol induced 


Is this a current diagnosis for this admission?: Yes   


Plan: 





03/19/19 14:36


3/19/2019 patient is admitted with acute pancreatitis most likely secondary to 

chronic alcohol abuse.





3/20/2019-patient was admitted with acute on chronic pancreatitis most likely 

secondary to chronic alcohol abuse.  Latest lipase is 279.





3/21/2019 patient was admitted with acute on chronic pancreatitis most likely 

secondary to chronic alcohol use.  Denies any abdominal pains.  Lipase levels 

are back to normal.





3/22/2019 patient was admitted with acute on chronic pancreatitis secondary to 

chronic alcohol abuse.  Lipase came down to 279.





3/23/2019-patient was admitted with acute on chronic pancreatitis secondary to 

alcohol abuse.  Lipase came down to normal.





3/24/2019-patient admitted with acute on chronic pancreatitis secondary to 

auscultate abuse.  Lipase level is back to normal.








(5) Suicide attempt


Is this a current diagnosis for this admission?: Yes   





(6) Acute kidney failure


Is this a current diagnosis for this admission?: Yes   


Plan: 


3/22/2019-patient's baseline creatinine is around 0.5 creatinine jumped to 1.63 

today.  Patient was hypotensive last night received 1 L of normal saline bolus 

acute kidney injury most likely secondary to prerenal causes plan is to recheck 

the labs again tomorrow.





3/23/2019-since baseline creatinine is around 0.5 it is jumped to 1.63 yesterday

after giving 1 L of IV fluid bolus creatinine came down to 0.57 today.  Acute 

kidney injury most likely secondary to prerenal causes including dehydration 

resolved.





3/24/2019-latest creatinine 0.6 acute kidney injury most likely prerenal causes 

due to poor  oral intake is resolved.








- Time


Time Spent with patient: 15-24 minutes


Medications reviewed and adjusted accordingly: Yes


Anticipated discharge: SNF

## 2019-03-25 LAB
ADD MANUAL DIFF: NO
ALBUMIN SERPL-MCNC: 4.3 G/DL (ref 3.5–5)
ALP SERPL-CCNC: 121 U/L (ref 38–126)
ALT SERPL-CCNC: 17 U/L (ref 9–52)
ANION GAP SERPL CALC-SCNC: 15 MMOL/L (ref 5–19)
AST SERPL-CCNC: 37 U/L (ref 14–36)
BASOPHILS # BLD AUTO: 0.1 10^3/UL (ref 0–0.2)
BASOPHILS NFR BLD AUTO: 1.5 % (ref 0–2)
BILIRUB DIRECT SERPL-MCNC: 0.4 MG/DL (ref 0–0.4)
BILIRUB SERPL-MCNC: 0.5 MG/DL (ref 0.2–1.3)
BUN SERPL-MCNC: 15 MG/DL (ref 7–20)
CALCIUM: 10.5 MG/DL (ref 8.4–10.2)
CHLORIDE SERPL-SCNC: 97 MMOL/L (ref 98–107)
CO2 SERPL-SCNC: 28 MMOL/L (ref 22–30)
EOSINOPHIL # BLD AUTO: 0.4 10^3/UL (ref 0–0.6)
EOSINOPHIL NFR BLD AUTO: 4.8 % (ref 0–6)
ERYTHROCYTE [DISTWIDTH] IN BLOOD BY AUTOMATED COUNT: 15.2 % (ref 11.5–14)
GLUCOSE SERPL-MCNC: 147 MG/DL (ref 75–110)
HCT VFR BLD CALC: 42.5 % (ref 36–47)
HGB BLD-MCNC: 14.3 G/DL (ref 12–15.5)
LYMPHOCYTES # BLD AUTO: 2.6 10^3/UL (ref 0.5–4.7)
LYMPHOCYTES NFR BLD AUTO: 30.5 % (ref 13–45)
MCH RBC QN AUTO: 30.7 PG (ref 27–33.4)
MCHC RBC AUTO-ENTMCNC: 33.7 G/DL (ref 32–36)
MCV RBC AUTO: 91 FL (ref 80–97)
MONOCYTES # BLD AUTO: 0.5 10^3/UL (ref 0.1–1.4)
MONOCYTES NFR BLD AUTO: 5.8 % (ref 3–13)
NEUTROPHILS # BLD AUTO: 4.9 10^3/UL (ref 1.7–8.2)
NEUTS SEG NFR BLD AUTO: 57.4 % (ref 42–78)
PLATELET # BLD: 398 10^3/UL (ref 150–450)
POTASSIUM SERPL-SCNC: 5.1 MMOL/L (ref 3.6–5)
PROT SERPL-MCNC: 8.2 G/DL (ref 6.3–8.2)
RBC # BLD AUTO: 4.65 10^6/UL (ref 3.72–5.28)
SODIUM SERPL-SCNC: 140.3 MMOL/L (ref 137–145)
TOTAL CELLS COUNTED % (AUTO): 100 %
WBC # BLD AUTO: 8.6 10^3/UL (ref 4–10.5)

## 2019-03-25 RX ADMIN — HYDROXYZINE PAMOATE SCH: 25 CAPSULE ORAL at 14:13

## 2019-03-25 RX ADMIN — OLANZAPINE SCH MG: 5 TABLET, FILM COATED ORAL at 09:51

## 2019-03-25 RX ADMIN — NICOTINE SCH: 21 PATCH, EXTENDED RELEASE TOPICAL at 09:52

## 2019-03-25 RX ADMIN — DULOXETINE SCH MG: 30 CAPSULE, DELAYED RELEASE ORAL at 09:50

## 2019-03-25 RX ADMIN — Medication SCH MG: at 05:17

## 2019-03-25 RX ADMIN — HYDROXYZINE PAMOATE SCH MG: 25 CAPSULE ORAL at 22:17

## 2019-03-25 RX ADMIN — GABAPENTIN SCH MG: 400 CAPSULE ORAL at 22:17

## 2019-03-25 RX ADMIN — BENZTROPINE MESYLATE SCH MG: 1 TABLET ORAL at 09:51

## 2019-03-25 RX ADMIN — GABAPENTIN SCH: 400 CAPSULE ORAL at 14:11

## 2019-03-25 RX ADMIN — LORAZEPAM PRN MG: 2 INJECTION INTRAMUSCULAR; INTRAVENOUS at 07:35

## 2019-03-25 RX ADMIN — AMLODIPINE BESYLATE SCH MG: 10 TABLET ORAL at 09:51

## 2019-03-25 RX ADMIN — POLYETHYLENE GLYCOL 3350 SCH: 17 POWDER, FOR SOLUTION ORAL at 09:52

## 2019-03-25 RX ADMIN — Medication SCH ML: at 22:17

## 2019-03-25 RX ADMIN — ROPINIROLE HYDROCHLORIDE SCH MG: 1 TABLET, FILM COATED ORAL at 09:51

## 2019-03-25 RX ADMIN — PROPRANOLOL HYDROCHLORIDE SCH MG: 20 TABLET ORAL at 22:17

## 2019-03-25 RX ADMIN — HYDROXYZINE PAMOATE SCH MG: 25 CAPSULE ORAL at 18:27

## 2019-03-25 RX ADMIN — HEPARIN SODIUM SCH UNIT: 5000 INJECTION, SOLUTION INTRAVENOUS; SUBCUTANEOUS at 22:17

## 2019-03-25 RX ADMIN — HEPARIN SODIUM SCH: 5000 INJECTION, SOLUTION INTRAVENOUS; SUBCUTANEOUS at 14:11

## 2019-03-25 RX ADMIN — BUSPIRONE HYDROCHLORIDE SCH MG: 10 TABLET ORAL at 18:27

## 2019-03-25 RX ADMIN — GABAPENTIN SCH MG: 400 CAPSULE ORAL at 05:17

## 2019-03-25 RX ADMIN — Medication SCH: at 14:11

## 2019-03-25 RX ADMIN — HYDROXYZINE PAMOATE SCH MG: 25 CAPSULE ORAL at 09:49

## 2019-03-25 RX ADMIN — ROPINIROLE HYDROCHLORIDE SCH MG: 1 TABLET, FILM COATED ORAL at 22:17

## 2019-03-25 RX ADMIN — Medication SCH MG: at 22:17

## 2019-03-25 RX ADMIN — HEPARIN SODIUM SCH UNIT: 5000 INJECTION, SOLUTION INTRAVENOUS; SUBCUTANEOUS at 05:17

## 2019-03-25 RX ADMIN — OLANZAPINE SCH MG: 5 TABLET, FILM COATED ORAL at 18:27

## 2019-03-25 RX ADMIN — Medication SCH ML: at 05:17

## 2019-03-25 RX ADMIN — LORAZEPAM PRN MG: 2 INJECTION INTRAMUSCULAR; INTRAVENOUS at 05:47

## 2019-03-25 RX ADMIN — LORAZEPAM PRN MG: 2 INJECTION INTRAMUSCULAR; INTRAVENOUS at 11:14

## 2019-03-25 RX ADMIN — BUSPIRONE HYDROCHLORIDE SCH MG: 10 TABLET ORAL at 09:50

## 2019-03-25 RX ADMIN — PROPRANOLOL HYDROCHLORIDE SCH MG: 20 TABLET ORAL at 09:50
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
## 2019-03-26 RX ADMIN — GABAPENTIN SCH: 400 CAPSULE ORAL at 14:02

## 2019-03-26 RX ADMIN — HEPARIN SODIUM SCH: 5000 INJECTION, SOLUTION INTRAVENOUS; SUBCUTANEOUS at 21:17

## 2019-03-26 RX ADMIN — Medication SCH MG: at 05:58

## 2019-03-26 RX ADMIN — OLANZAPINE SCH MG: 5 TABLET, FILM COATED ORAL at 11:24

## 2019-03-26 RX ADMIN — HEPARIN SODIUM SCH UNIT: 5000 INJECTION, SOLUTION INTRAVENOUS; SUBCUTANEOUS at 05:59

## 2019-03-26 RX ADMIN — ROPINIROLE HYDROCHLORIDE SCH MG: 1 TABLET, FILM COATED ORAL at 11:25

## 2019-03-26 RX ADMIN — GABAPENTIN SCH MG: 400 CAPSULE ORAL at 21:17

## 2019-03-26 RX ADMIN — Medication SCH MG: at 21:20

## 2019-03-26 RX ADMIN — QUETIAPINE FUMARATE SCH MG: 25 TABLET, FILM COATED ORAL at 21:09

## 2019-03-26 RX ADMIN — Medication SCH ML: at 21:20

## 2019-03-26 RX ADMIN — HYDROXYZINE PAMOATE SCH MG: 25 CAPSULE ORAL at 11:25

## 2019-03-26 RX ADMIN — HYDROXYZINE PAMOATE SCH MG: 25 CAPSULE ORAL at 21:21

## 2019-03-26 RX ADMIN — HALOPERIDOL LACTATE PRN MG: 5 INJECTION, SOLUTION INTRAMUSCULAR at 21:06

## 2019-03-26 RX ADMIN — PROPRANOLOL HYDROCHLORIDE SCH MG: 20 TABLET ORAL at 11:24

## 2019-03-26 RX ADMIN — HEPARIN SODIUM SCH: 5000 INJECTION, SOLUTION INTRAVENOUS; SUBCUTANEOUS at 14:02

## 2019-03-26 RX ADMIN — LORAZEPAM PRN MG: 2 INJECTION INTRAMUSCULAR; INTRAVENOUS at 18:34

## 2019-03-26 RX ADMIN — HYDROXYZINE PAMOATE SCH: 25 CAPSULE ORAL at 14:03

## 2019-03-26 RX ADMIN — Medication SCH: at 14:02

## 2019-03-26 RX ADMIN — BENZTROPINE MESYLATE SCH MG: 1 TABLET ORAL at 11:23

## 2019-03-26 RX ADMIN — Medication SCH ML: at 05:59

## 2019-03-26 RX ADMIN — GABAPENTIN SCH MG: 400 CAPSULE ORAL at 05:58

## 2019-03-26 RX ADMIN — Medication SCH: at 14:03

## 2019-03-26 RX ADMIN — HALOPERIDOL SCH MG: 5 TABLET ORAL at 21:18

## 2019-03-26 RX ADMIN — BUSPIRONE HYDROCHLORIDE SCH: 10 TABLET ORAL at 17:38

## 2019-03-26 RX ADMIN — BUSPIRONE HYDROCHLORIDE SCH MG: 10 TABLET ORAL at 11:23

## 2019-03-26 RX ADMIN — PROPRANOLOL HYDROCHLORIDE SCH MG: 20 TABLET ORAL at 21:08

## 2019-03-26 RX ADMIN — HALOPERIDOL LACTATE PRN MG: 5 INJECTION, SOLUTION INTRAMUSCULAR at 15:20

## 2019-03-26 RX ADMIN — HYDROXYZINE PAMOATE SCH: 25 CAPSULE ORAL at 17:38

## 2019-03-26 RX ADMIN — POLYETHYLENE GLYCOL 3350 SCH: 17 POWDER, FOR SOLUTION ORAL at 11:25

## 2019-03-26 RX ADMIN — AMLODIPINE BESYLATE SCH MG: 10 TABLET ORAL at 11:23

## 2019-03-26 RX ADMIN — NICOTINE SCH: 21 PATCH, EXTENDED RELEASE TOPICAL at 11:26
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
## 2019-03-26 NOTE — PDOC PROGRESS REPORT
Subjective


Progress Note for:: 19


Subjective:: 


This is a 58 years old  female patient with past medical history of 

depression and alcohol abuse who was brought by EMS for being suicidal and 

alcohol intoxication.  





She was initially IVCed which was subsequently rescinded.





Her stay has been prolonged due to waxing and waning but persistent 

hallucination and agitations.





Per RN, she had an episode of agitation again last night. This morning, she did 

admit to hearing the voice of her  mother yesterday. She also admits to 

seeing giraffes and an old woman in the room. Denies suicidal or homicidal 

ideation. Discussed with Dr. Rose, appreciate recommendations and possible 

need for inpatient psych placement.





She is medically cleared from hospitalist standpoint for transfer to inpatient 

psych.





Reason For Visit: 


PANCREATITIS, ETOH INTOXICATION








Physical Exam


Vital Signs: 


                                        











Temp Pulse Resp BP Pulse Ox


 


 97.5 F   98   16   140/80 H  92 


 


 19 11:11  19 15:28  19 07:45  19 15:28  19 15:28








                                 Intake & Output











 19





 06:59 06:59 06:59


 


Intake Total 956 360 


 


Output Total 750 500 


 


Balance 206 -140 


 


Weight 143 lb 1.28 oz 140 lb 3.424 oz 











General appearance: PRESENT: no acute distress, well-developed, well-nourished


Head exam: PRESENT: atraumatic, normocephalic


Eye exam: PRESENT: conjunctiva pink, EOMI, PERRLA.  ABSENT: scleral icterus


Ear exam: PRESENT: normal external ear exam


Mouth exam: PRESENT: moist, tongue midline


Neck exam: ABSENT: carotid bruit, JVD, lymphadenopathy, thyromegaly


Respiratory exam: PRESENT: clear to auscultation hill.  ABSENT: rales, rhonchi, 

wheezes


Cardiovascular exam: PRESENT: RRR.  ABSENT: diastolic murmur, rubs, systolic 

murmur


Pulses: PRESENT: normal dorsalis pedis pul


GI/Abdominal exam: PRESENT: normal bowel sounds, soft.  ABSENT: distended, 

guarding, mass, organolmegaly, rebound, tenderness


Rectal exam: PRESENT: deferred


Neurological exam: PRESENT: alert, awake, oriented to person, CN II-XII grossly 

intact.  ABSENT: motor sensory deficit


Psychiatric exam: PRESENT: other - She also admits to seeing giraffes and an old

woman in the room. Denies suicidal or homicidal ideation.





Results


Laboratory Results: 


                                        





                                 19 05:43 





                                 19 08:35 





                                        











  19





  11:47


 


Troponin I  < 0.012











Impressions: 


                                        





Abdomen/Pelvis CT  19 11:42


IMPRESSION:  Fatty liver


Gallstones


Peripancreatic retroperitoneal inflammation, question pancreatitis


 








Cervical Spine CT  19 11:42


IMPRESSION:  CHRONIC DEGENERATIVE CHANGES.  NO ACUTE FINDINGS. Similar left 

thyroid nodule with calcifications.


 








Chest CT  19 11:42


IMPRESSION:  No acute changes


 








Head CT  19 11:42


IMPRESSION:  No acute intracranial findings.


EVIDENCE OF ACUTE STROKE: NO.


 














Assessment and Plan





- Diagnosis


(1) Acute kidney failure


Is this a current diagnosis for this admission?: Yes   


Plan: 


Resolved with IV fluids.








(2) Psychosis


Is this a current diagnosis for this admission?: Yes   


Plan: 


Discussed with Dr. Rose. Will switch Zyprexa to PO Haldol. DC ropinirole. 

Will also add prn thorazine.








- Time


Time Spent with patient: 25-34 minutes

## 2019-03-27 LAB
ADD MANUAL DIFF: NO
ANION GAP SERPL CALC-SCNC: 10 MMOL/L (ref 5–19)
BASOPHILS # BLD AUTO: 0.1 10^3/UL (ref 0–0.2)
BASOPHILS NFR BLD AUTO: 1.3 % (ref 0–2)
BUN SERPL-MCNC: 14 MG/DL (ref 7–20)
CALCIUM: 10.8 MG/DL (ref 8.4–10.2)
CHLORIDE SERPL-SCNC: 98 MMOL/L (ref 98–107)
CO2 SERPL-SCNC: 31 MMOL/L (ref 22–30)
EOSINOPHIL # BLD AUTO: 0.6 10^3/UL (ref 0–0.6)
EOSINOPHIL NFR BLD AUTO: 6.6 % (ref 0–6)
ERYTHROCYTE [DISTWIDTH] IN BLOOD BY AUTOMATED COUNT: 14.9 % (ref 11.5–14)
GLUCOSE SERPL-MCNC: 112 MG/DL (ref 75–110)
HCT VFR BLD CALC: 42.4 % (ref 36–47)
HGB BLD-MCNC: 14.6 G/DL (ref 12–15.5)
LYMPHOCYTES # BLD AUTO: 1.8 10^3/UL (ref 0.5–4.7)
LYMPHOCYTES NFR BLD AUTO: 18.1 % (ref 13–45)
MCH RBC QN AUTO: 30.9 PG (ref 27–33.4)
MCHC RBC AUTO-ENTMCNC: 34.4 G/DL (ref 32–36)
MCV RBC AUTO: 90 FL (ref 80–97)
MONOCYTES # BLD AUTO: 0.7 10^3/UL (ref 0.1–1.4)
MONOCYTES NFR BLD AUTO: 6.9 % (ref 3–13)
NEUTROPHILS # BLD AUTO: 6.6 10^3/UL (ref 1.7–8.2)
NEUTS SEG NFR BLD AUTO: 67.1 % (ref 42–78)
PLATELET # BLD: 374 10^3/UL (ref 150–450)
POTASSIUM SERPL-SCNC: 4.5 MMOL/L (ref 3.6–5)
RBC # BLD AUTO: 4.72 10^6/UL (ref 3.72–5.28)
SODIUM SERPL-SCNC: 139.1 MMOL/L (ref 137–145)
TOTAL CELLS COUNTED % (AUTO): 100 %
WBC # BLD AUTO: 9.8 10^3/UL (ref 4–10.5)

## 2019-03-27 RX ADMIN — Medication SCH: at 23:32

## 2019-03-27 RX ADMIN — HYDROXYZINE PAMOATE SCH: 25 CAPSULE ORAL at 23:32

## 2019-03-27 RX ADMIN — LORAZEPAM PRN MG: 2 INJECTION INTRAMUSCULAR; INTRAVENOUS at 15:32

## 2019-03-27 RX ADMIN — AMLODIPINE BESYLATE SCH MG: 10 TABLET ORAL at 09:21

## 2019-03-27 RX ADMIN — PROPRANOLOL HYDROCHLORIDE SCH MG: 20 TABLET ORAL at 09:21

## 2019-03-27 RX ADMIN — BENZTROPINE MESYLATE SCH MG: 1 TABLET ORAL at 09:21

## 2019-03-27 RX ADMIN — HYDROXYZINE PAMOATE SCH MG: 25 CAPSULE ORAL at 09:21

## 2019-03-27 RX ADMIN — BUSPIRONE HYDROCHLORIDE SCH MG: 10 TABLET ORAL at 09:21

## 2019-03-27 RX ADMIN — QUETIAPINE FUMARATE SCH: 25 TABLET, FILM COATED ORAL at 23:31

## 2019-03-27 RX ADMIN — Medication SCH: at 23:31

## 2019-03-27 RX ADMIN — HEPARIN SODIUM SCH: 5000 INJECTION, SOLUTION INTRAVENOUS; SUBCUTANEOUS at 23:33

## 2019-03-27 RX ADMIN — NICOTINE SCH EACH: 21 PATCH, EXTENDED RELEASE TOPICAL at 09:20

## 2019-03-27 RX ADMIN — HYDROXYZINE PAMOATE SCH MG: 25 CAPSULE ORAL at 15:32

## 2019-03-27 RX ADMIN — PROPRANOLOL HYDROCHLORIDE SCH: 20 TABLET ORAL at 23:31

## 2019-03-27 RX ADMIN — GABAPENTIN SCH MG: 400 CAPSULE ORAL at 15:32

## 2019-03-27 RX ADMIN — HEPARIN SODIUM SCH UNIT: 5000 INJECTION, SOLUTION INTRAVENOUS; SUBCUTANEOUS at 15:31

## 2019-03-27 RX ADMIN — HEPARIN SODIUM SCH: 5000 INJECTION, SOLUTION INTRAVENOUS; SUBCUTANEOUS at 23:30

## 2019-03-27 RX ADMIN — HALOPERIDOL LACTATE PRN MG: 5 INJECTION, SOLUTION INTRAMUSCULAR at 19:50

## 2019-03-27 RX ADMIN — Medication SCH ML: at 06:35

## 2019-03-27 RX ADMIN — GABAPENTIN SCH: 400 CAPSULE ORAL at 23:31

## 2019-03-27 RX ADMIN — Medication SCH MG: at 06:35

## 2019-03-27 RX ADMIN — LORAZEPAM PRN MG: 2 INJECTION INTRAMUSCULAR; INTRAVENOUS at 19:50

## 2019-03-27 RX ADMIN — HALOPERIDOL SCH: 5 TABLET ORAL at 23:30

## 2019-03-27 RX ADMIN — Medication SCH: at 13:02

## 2019-03-27 RX ADMIN — HALOPERIDOL SCH MG: 5 TABLET ORAL at 09:21

## 2019-03-27 RX ADMIN — HEPARIN SODIUM SCH UNIT: 5000 INJECTION, SOLUTION INTRAVENOUS; SUBCUTANEOUS at 06:36

## 2019-03-27 RX ADMIN — BUSPIRONE HYDROCHLORIDE SCH MG: 10 TABLET ORAL at 16:59

## 2019-03-27 RX ADMIN — POLYETHYLENE GLYCOL 3350 SCH: 17 POWDER, FOR SOLUTION ORAL at 09:21

## 2019-03-27 RX ADMIN — HYDROXYZINE PAMOATE SCH MG: 25 CAPSULE ORAL at 16:59

## 2019-03-27 RX ADMIN — GABAPENTIN SCH MG: 400 CAPSULE ORAL at 06:35

## 2019-03-27 RX ADMIN — Medication SCH MG: at 15:32

## 2019-03-27 RX ADMIN — QUETIAPINE FUMARATE SCH: 25 TABLET, FILM COATED ORAL at 09:22

## 2019-03-27 NOTE — PDOC PROGRESS REPORT
Subjective


Progress Note for:: 19


Subjective:: 


This is a 58 years old  female patient with past medical history of 

depression and alcohol abuse who was brought by EMS for being suicidal and 

alcohol intoxication.  





She was initially IVCed which was subsequently rescinded.





Her stay has been prolonged due to waxing and waning but persistent 

hallucination and agitations.





3/26: Per RN, she had an episode of agitation again last night. This morning, 

she did admit to hearing the voice of her  mother yesterday. She also 

admits to seeing giraffes and an old woman in the room. Denies suicidal or ho

micidal ideation. Discussed with Dr. Rose, appreciate recommendations and 

possible need for inpatient psych placement. She is medically cleared from 

hospitalist standpoint for transfer to inpatient psych.





3/27: Patient slept better last night per RN although she still had episodes of 

restlessness this morning. Awaiting inpatient psych placement. Discussed with 

daughter on bedside and updated on patient status. She reiterates her mother has

a long standing history of depression and suicidal ideations in the past on top 

of her chronic alcoholism. Discussed her waxing and waning agitation and 

occasional hallucinations are likely multifactorial from her pysch issues and 

possible chronic cognitive effects of heavy alcoholism. Daughter did say that 

they were recommended to follow up and see a neuropsychologist outpatient but 

that it has been extremely difficult for patient to follow through these 

recommendations due to her behavioral issues. 





Reason For Visit: 


PANCREATITIS, ETOH INTOXICATION








Physical Exam


Vital Signs: 


                                        











Temp Pulse Resp BP Pulse Ox


 


 97.8 F   83   18   125/77   93 


 


 19 08:02  19 11:07  19 11:07  19 11:07  19 11:07








                                 Intake & Output











 19





 06:59 06:59 06:59


 


Intake Total 360  


 


Output Total 500  


 


Balance -140  


 


Weight 140 lb 3.424 oz 135 lb 2.294 oz 











General appearance: PRESENT: no acute distress, well-developed, well-nourished


Head exam: PRESENT: atraumatic, normocephalic


Eye exam: PRESENT: conjunctiva pink, EOMI, PERRLA.  ABSENT: scleral icterus


Ear exam: PRESENT: normal external ear exam


Mouth exam: PRESENT: moist, tongue midline


Neck exam: ABSENT: carotid bruit, JVD, lymphadenopathy, thyromegaly


Respiratory exam: PRESENT: clear to auscultation hill.  ABSENT: rales, rhonchi, 

wheezes


Cardiovascular exam: PRESENT: RRR.  ABSENT: diastolic murmur, rubs, systolic 

murmur


Pulses: PRESENT: normal dorsalis pedis pul


GI/Abdominal exam: PRESENT: normal bowel sounds, soft.  ABSENT: distended, 

guarding, mass, organolmegaly, rebound, tenderness


Rectal exam: PRESENT: deferred


Neurological exam: PRESENT: alert, oriented to person, CN II-XII grossly intact.

 ABSENT: motor sensory deficit


Psychiatric exam: PRESENT: agitated





Results


Laboratory Results: 


                                        





                                 19 05:43 





                                 19 08:35 





                                        











  19





  11:47


 


Troponin I  < 0.012











Impressions: 


                                        





Abdomen/Pelvis CT  19 11:42


IMPRESSION:  Fatty liver


Gallstones


Peripancreatic retroperitoneal inflammation, question pancreatitis


 








Cervical Spine CT  19 11:42


IMPRESSION:  CHRONIC DEGENERATIVE CHANGES.  NO ACUTE FINDINGS. Similar left 

thyroid nodule with calcifications.


 








Chest CT  19 11:42


IMPRESSION:  No acute changes


 








Head CT  19 11:42


IMPRESSION:  No acute intracranial findings.


EVIDENCE OF ACUTE STROKE: NO.


 














Assessment and Plan





- Diagnosis


(1) Acute kidney failure


Is this a current diagnosis for this admission?: Yes   


Plan: 


Resolved with IV fluids.








(2) Psychosis


Is this a current diagnosis for this admission?: Yes   


Plan: 


Discussed with Dr. Rose. Will switch Zyprexa to PO Haldol. DC ropinirole. 

Will also add prn thorazine.





3/26: Per RN, she had an episode of agitation again last night. This morning, 

she did admit to hearing the voice of her  mother yesterday. She also 

admits to seeing giraffes and an old woman in the room. Denies suicidal or 

homicidal ideation. Discussed with Dr. Rose, appreciate recommendations and 

possible need for inpatient psych placement. She is medically cleared from 

hospitalist standpoint for transfer to inpatient psych.





3/27: Patient slept better last night per RN although she still had episodes of 

restlessness this morning. Awaiting inpatient psych placement. Discussed with 

daughter on bedside and updated on patient status. She reiterates her mother has

a long standing history of depression and suicidal ideations in the past on top 

of her chronic alcoholism. Discussed her waxing and waning agitation and 

occasional hallucinations are likely multifactorial from her pysch issues and 

possible chronic cognitive effects of heavy alcoholism. Daughter did say that 

they were recommended to follow up and see a neuropsychologist outpatient but 

that it has been extremely difficult for patient to follow through these r

ecommendations due to her behavioral issues. 








(3) Alcohol withdrawal


Is this a current diagnosis for this admission?: Yes   


Plan: 


Resolved.








- Time


Time Spent with patient: 25-34 minutes

## 2019-03-28 LAB
ADD MANUAL DIFF: NO
ALBUMIN SERPL-MCNC: 4.1 G/DL (ref 3.5–5)
ALP SERPL-CCNC: 131 U/L (ref 38–126)
ALT SERPL-CCNC: 16 U/L (ref 9–52)
ANION GAP SERPL CALC-SCNC: 12 MMOL/L (ref 5–19)
AST SERPL-CCNC: 33 U/L (ref 14–36)
BASOPHILS # BLD AUTO: 0.1 10^3/UL (ref 0–0.2)
BASOPHILS NFR BLD AUTO: 1.4 % (ref 0–2)
BILIRUB DIRECT SERPL-MCNC: 0.4 MG/DL (ref 0–0.4)
BILIRUB SERPL-MCNC: 0.5 MG/DL (ref 0.2–1.3)
BUN SERPL-MCNC: 17 MG/DL (ref 7–20)
CALCIUM: 10.1 MG/DL (ref 8.4–10.2)
CHLORIDE SERPL-SCNC: 97 MMOL/L (ref 98–107)
CO2 SERPL-SCNC: 28 MMOL/L (ref 22–30)
EOSINOPHIL # BLD AUTO: 0.5 10^3/UL (ref 0–0.6)
EOSINOPHIL NFR BLD AUTO: 7.2 % (ref 0–6)
ERYTHROCYTE [DISTWIDTH] IN BLOOD BY AUTOMATED COUNT: 14.8 % (ref 11.5–14)
GLUCOSE SERPL-MCNC: 148 MG/DL (ref 75–110)
HCT VFR BLD CALC: 43.8 % (ref 36–47)
HGB BLD-MCNC: 14.8 G/DL (ref 12–15.5)
LYMPHOCYTES # BLD AUTO: 1.7 10^3/UL (ref 0.5–4.7)
LYMPHOCYTES NFR BLD AUTO: 23.7 % (ref 13–45)
MCH RBC QN AUTO: 30.4 PG (ref 27–33.4)
MCHC RBC AUTO-ENTMCNC: 33.8 G/DL (ref 32–36)
MCV RBC AUTO: 90 FL (ref 80–97)
MONOCYTES # BLD AUTO: 0.5 10^3/UL (ref 0.1–1.4)
MONOCYTES NFR BLD AUTO: 7 % (ref 3–13)
NEUTROPHILS # BLD AUTO: 4.4 10^3/UL (ref 1.7–8.2)
NEUTS SEG NFR BLD AUTO: 60.7 % (ref 42–78)
PLATELET # BLD: 300 10^3/UL (ref 150–450)
POTASSIUM SERPL-SCNC: 4 MMOL/L (ref 3.6–5)
PROT SERPL-MCNC: 8.3 G/DL (ref 6.3–8.2)
RBC # BLD AUTO: 4.88 10^6/UL (ref 3.72–5.28)
SODIUM SERPL-SCNC: 137.2 MMOL/L (ref 137–145)
TOTAL CELLS COUNTED % (AUTO): 100 %
WBC # BLD AUTO: 7.3 10^3/UL (ref 4–10.5)

## 2019-03-28 RX ADMIN — POLYETHYLENE GLYCOL 3350 SCH: 17 POWDER, FOR SOLUTION ORAL at 11:19

## 2019-03-28 RX ADMIN — HYDROXYZINE PAMOATE SCH: 25 CAPSULE ORAL at 16:35

## 2019-03-28 RX ADMIN — LORAZEPAM PRN MG: 2 INJECTION INTRAMUSCULAR; INTRAVENOUS at 04:04

## 2019-03-28 RX ADMIN — Medication SCH MG: at 06:00

## 2019-03-28 RX ADMIN — LORAZEPAM PRN MG: 2 INJECTION INTRAMUSCULAR; INTRAVENOUS at 13:53

## 2019-03-28 RX ADMIN — BUSPIRONE HYDROCHLORIDE SCH MG: 10 TABLET ORAL at 18:51

## 2019-03-28 RX ADMIN — NICOTINE SCH EACH: 21 PATCH, EXTENDED RELEASE TOPICAL at 11:18

## 2019-03-28 RX ADMIN — PROPRANOLOL HYDROCHLORIDE SCH MG: 20 TABLET ORAL at 11:17

## 2019-03-28 RX ADMIN — LORAZEPAM PRN MG: 2 INJECTION INTRAMUSCULAR; INTRAVENOUS at 18:51

## 2019-03-28 RX ADMIN — Medication SCH ML: at 21:37

## 2019-03-28 RX ADMIN — HALOPERIDOL LACTATE PRN MG: 5 INJECTION, SOLUTION INTRAMUSCULAR at 04:04

## 2019-03-28 RX ADMIN — QUETIAPINE FUMARATE SCH MG: 25 TABLET, FILM COATED ORAL at 21:38

## 2019-03-28 RX ADMIN — Medication SCH MG: at 21:38

## 2019-03-28 RX ADMIN — QUETIAPINE FUMARATE SCH MG: 25 TABLET, FILM COATED ORAL at 11:23

## 2019-03-28 RX ADMIN — Medication SCH: at 16:34

## 2019-03-28 RX ADMIN — Medication SCH ML: at 05:28

## 2019-03-28 RX ADMIN — HYDROXYZINE PAMOATE SCH MG: 25 CAPSULE ORAL at 21:37

## 2019-03-28 RX ADMIN — GABAPENTIN SCH MG: 400 CAPSULE ORAL at 05:28

## 2019-03-28 RX ADMIN — HYDROXYZINE PAMOATE SCH MG: 25 CAPSULE ORAL at 18:51

## 2019-03-28 RX ADMIN — HALOPERIDOL SCH MG: 5 TABLET ORAL at 21:37

## 2019-03-28 RX ADMIN — GABAPENTIN SCH: 400 CAPSULE ORAL at 16:34

## 2019-03-28 RX ADMIN — PROPRANOLOL HYDROCHLORIDE SCH MG: 20 TABLET ORAL at 21:37

## 2019-03-28 RX ADMIN — HEPARIN SODIUM SCH: 5000 INJECTION, SOLUTION INTRAVENOUS; SUBCUTANEOUS at 16:34

## 2019-03-28 RX ADMIN — FOLIC ACID SCH MG: 1 TABLET ORAL at 11:15

## 2019-03-28 RX ADMIN — BENZTROPINE MESYLATE SCH MG: 1 TABLET ORAL at 11:17

## 2019-03-28 RX ADMIN — GABAPENTIN SCH MG: 400 CAPSULE ORAL at 21:37

## 2019-03-28 RX ADMIN — HALOPERIDOL SCH MG: 5 TABLET ORAL at 11:14

## 2019-03-28 RX ADMIN — HEPARIN SODIUM SCH: 5000 INJECTION, SOLUTION INTRAVENOUS; SUBCUTANEOUS at 05:28

## 2019-03-28 RX ADMIN — HEPARIN SODIUM SCH: 5000 INJECTION, SOLUTION INTRAVENOUS; SUBCUTANEOUS at 21:31

## 2019-03-28 RX ADMIN — AMLODIPINE BESYLATE SCH MG: 10 TABLET ORAL at 11:17

## 2019-03-28 RX ADMIN — BUSPIRONE HYDROCHLORIDE SCH MG: 10 TABLET ORAL at 11:18

## 2019-03-28 RX ADMIN — HYDROXYZINE PAMOATE SCH MG: 25 CAPSULE ORAL at 11:15

## 2019-03-28 RX ADMIN — LORAZEPAM PRN MG: 2 INJECTION INTRAMUSCULAR; INTRAVENOUS at 08:52

## 2019-03-28 NOTE — PSYCHOLOGICAL NOTE
Psych Note





- Psych Note


Date seen by psych provider: 03/27/19


Time seen by psych provider: 14:29 - Phone discussion with nurse at 1238. 

Observation of patient at 1428. Collateral and discussion with daughter from 

[phone].


Psych Note: 


Reason for Consult: Continued Hallucinations and psychosis





Contact Permissions: Daughter Tammie Zhou





Patient is a 58 year old female who presented to the ED 03/08/19 via EMS for SI 

after being found inn household. Attending ED Physician documented patient 

smelled like alcohol, was acutely intoxicated, had self inflicted wounds to her 

right wrist which she reported she had done 3 days prior. She was put on 24 Hour

IVC Petition. She was subsequently admitted same day to hospitalist services for

acute alcohol intoxication, alcohol dependence continuous, hyponatremia, 

pancreatitis and suicidal attempt. She was seen by MH in the ED on 03/08/19 

which is when the 24 hour IVC Petition was put in place. She was seen 03/09/19 

where she denied SI attempt, admitted to previous attempt 5 years ago by 

shooting self in abdomen, admitted to drinking 10 beers a day which started a 

few years ago, medication changes/recommendations were provided (Zyprexa, 

Cogentin, Buspar), she said she was willing to go to [facility]

and was psychiatrically cleared. 





Spoke to attending nurse at 1238. She called to inform Behavioral Health 

daughter was onsite and wanted to speak with the clinician in person. She 

reported patient was removed from restraints at 1130, which were in place only 

for preventing patient from pulling out IVs and trying to get up out of bed 

(safety/nonviolent). She further stated patient was not writhing around in bed, 

the morning medication administration seemed to help with that, she had been 

relatively calm today and she slept all night.





Observed patient sleeping in bed, out of restraints and actually resting (lying 

still).





Spent an hour with daughter in the 3rd floor waiting area. She provided 

collateral information. She was made aware of what was going on in terms of 

treatment, IVC process, short term acute placement and what that entails. She 

was psychoeducated on alcoholism, what Wernicke's Syndrome is, how that can be 

diagnosed officially via neurology with things like MRIs and neuropsychological 

testing, and how patient may require several months of rehabilitation before she

gets to her previous baseline or a new baseline. Daughter reported the sitter 

told her patient ate the majority of her breakfast which was the first in while 

per daughter. She reported patient "was actually sleeping today, doing better 

than previous days, still had hallucinations (saying she was going to see her 

baby) and she was able to keep patient in the bed without her becoming 

agitated." She reported "in being around my mother this hospital visit it seemed

as if her left and right functioning was crossed." She identified 5years ago 

patient had a suicide attempt, and MRI was done then and she was informed the 

results suggested frontal lobe deterioration. She stated patient has been to 

[facility] for alcohol detox/rehab 5 times, gets stabilized for

a little while/comes back home/then relapses, the most recent was November 2018 

when she came to [facility] then had to go to [place] then to [facility] due to seizures 

and then once at [facility] had to go to [facility] again for seizures. She spent 5-6 weeks 

in treatment at that Nov 2018 visit, still had "residual effects from alcohol wi

thdrawal, [facility] felt like there could be Wernicke's Syndrome and recommended a 

neuropsych evaluation." She acknowledged patient saw Dr. Munoz for medication 

management, was prescribed Valium, abused the Valium and "it seemed to do the 

same thing drinking does for my mom." She noted it did help with patient's 

anxiety, she built up a high tolerance, "which she seems to do with most 

medications." She noted in December 2018 up until the past couple weeks patient 

"slept a lot and was not taking care of herself (even sober)." She described 

patient as "exhibiting a lot of Bipolar Symptoms such as mood swings, ups and 

downs, irritability for the past year or so." She identified patient "had been 

on the Requip long term and she cannot sleep without it." She noted patient "was

on Seroquel at home but took it all at once at night." She acknowledged "short 

term memory issues and forgetting things long before previous alcohol treatment 

in November 2018." Daughter identified she reached out to [facility], spoke to 

Thompson, treatment is 30-45 days, it is not a locked down facility, there are 3 

different levels of care, but he stated she would have to have the neurological 

testing and diagnosis prior to going as they are not equipped to conduct such 

evaluation/testing. She was made aware this would be true of many facilities and

treatment programs and often is done on an outpatient level. She was informed 

the type of placement the [facility] team does is acute or short term

psychiatric which is  mainly medication management.        





Diagnoses:


303.1 (F33.20) Alcohol Use Disorder, Moderate to Severe


291.89 (F10.24) Alcohol Induced Depressive Disorder, Severe


R/O Neurodegenerative issues as a result of long term alcohol use





Medication recommendations made by the psychiatric medical provider, Dr. Sebastien MD., includes:


Continue with what is in place given patient had some improvements (slept 

through the night last night, is able to sleep in bed peacefully without 

writhing around)





Impression/Plan: Recommendation to maintain IVC given medication changes just 

took place 03/26/19 and 03/25/19 and today has been her best day thus far. 

Attending Hospitalist informed Behavioral Health  patient was 

medically cleared, was asked to provide documentation saying such, informed that

IVs would prevent patient from getting accepted since that would indicate not 

medically cleared from their standpoint and referral packet would be put 

together to start placement efforts. Consulted with Dr. Rose regarding the 

management and care of patient.

## 2019-03-28 NOTE — PDOC PROGRESS REPORT
Subjective


Progress Note for:: 19


Subjective:: 


This is a 58 years old  female patient with past medical history of 

depression and alcohol abuse who was brought by EMS for being suicidal and 

alcohol intoxication.  





She was initially IVCed which was subsequently rescinded.





Her stay has been prolonged due to waxing and waning but persistent 

hallucination and agitations.





3/26: Per RN, she had an episode of agitation again last night. This morning, 

she did admit to hearing the voice of her  mother yesterday. She also 

admits to seeing giraffes and an old woman in the room. Denies suicidal or ho

micidal ideation. Discussed with Dr. Rose, appreciate recommendations and 

possible need for inpatient psych placement. She is medically cleared from 

hospitalist standpoint for transfer to inpatient psych.





3/27: Patient slept better last night per RN although she still had episodes of 

restlessness this morning. Awaiting inpatient psych placement. Discussed with 

daughter on bedside and updated on patient status. She reiterates her mother has

a long standing history of depression and suicidal ideations in the past on top 

of her chronic alcoholism. Discussed her waxing and waning agitation and 

occasional hallucinations are likely multifactorial from her pysch issues and 

possible chronic cognitive effects of heavy alcoholism. Daughter did say that 

they were recommended to follow up and see a neuropsychologist outpatient but 

that it has been extremely difficult for patient to follow through these 

recommendations due to her behavioral issues. 





3/28: Patient is less agitated today but still has restlessness. She has been 

free of restraints for more than 24 hrs now. She continues to report that she 

was hearing her grandmother's voice last night. She awaits inpatient psych 

placement.


Reason For Visit: 


PANCREATITIS, ETOH INTOXICATION








Physical Exam


Vital Signs: 


                                        











Temp Pulse Resp BP Pulse Ox


 


 97.6 F   121 H  17   108/78   95 


 


 19 07:51  19 07:51  19 07:51  19 07:51  19 07:51








                                 Intake & Output











 19





 06:59 06:59 06:59


 


Intake Total   360


 


Balance   360


 


Weight 135 lb 2.294 oz 136 lb 3.931 oz 











General appearance: PRESENT: no acute distress, well-developed, well-nourished


Head exam: PRESENT: atraumatic, normocephalic


Eye exam: PRESENT: conjunctiva pink, EOMI, PERRLA.  ABSENT: scleral icterus


Ear exam: PRESENT: normal external ear exam


Mouth exam: PRESENT: moist, tongue midline


Neck exam: ABSENT: carotid bruit, JVD, lymphadenopathy, thyromegaly


Respiratory exam: PRESENT: clear to auscultation hill.  ABSENT: rales, rhonchi, 

wheezes


Cardiovascular exam: PRESENT: RRR.  ABSENT: diastolic murmur, rubs, systolic 

murmur


Pulses: PRESENT: normal dorsalis pedis pul


GI/Abdominal exam: PRESENT: normal bowel sounds, soft.  ABSENT: distended, 

guarding, mass, organolmegaly, rebound, tenderness


Rectal exam: PRESENT: deferred


Neurological exam: PRESENT: alert, awake, oriented to person, CN II-XII grossly 

intact.  ABSENT: motor sensory deficit





Results


Laboratory Results: 


                                        





                                 19 05:33 





                                 19 05:33 





                                        











  19





  05:33 05:33


 


WBC  7.3 


 


RBC  4.88 


 


Hgb  14.8 


 


Hct  43.8 


 


MCV  90 


 


MCH  30.4 


 


MCHC  33.8 


 


RDW  14.8 H 


 


Plt Count  300 


 


Seg Neutrophils %  60.7 


 


Lymphocytes %  23.7 


 


Monocytes %  7.0 


 


Eosinophils %  7.2 H 


 


Basophils %  1.4 


 


Absolute Neutrophils  4.4 


 


Absolute Lymphocytes  1.7 


 


Absolute Monocytes  0.5 


 


Absolute Eosinophils  0.5 


 


Absolute Basophils  0.1 


 


Sodium   137.2


 


Potassium   4.0


 


Chloride   97 L


 


Carbon Dioxide   28


 


Anion Gap   12


 


BUN   17


 


Creatinine   0.78


 


Est GFR ( Amer)   > 60


 


Est GFR (Non-Af Amer)   > 60


 


Glucose   148 H


 


Calcium   10.1


 


Total Bilirubin   0.5


 


AST   33


 


ALT   16


 


Alkaline Phosphatase   131 H


 


Total Protein   8.3 H


 


Albumin   4.1








                                        











  19





  11:47


 


Troponin I  < 0.012











Impressions: 


                                        





Abdomen/Pelvis CT  19 11:42


IMPRESSION:  Fatty liver


Gallstones


Peripancreatic retroperitoneal inflammation, question pancreatitis


 








Cervical Spine CT  19 11:42


IMPRESSION:  CHRONIC DEGENERATIVE CHANGES.  NO ACUTE FINDINGS. Similar left 

thyroid nodule with calcifications.


 








Chest CT  19 11:42


IMPRESSION:  No acute changes


 








Head CT  19 11:42


IMPRESSION:  No acute intracranial findings.


EVIDENCE OF ACUTE STROKE: NO.


 














Assessment and Plan





- Diagnosis


(1) Psychosis


Is this a current diagnosis for this admission?: Yes   


Plan: 


Discussed with Dr. Rose. Will switch Zyprexa to PO Haldol. DC ropinirole. 

Will also add prn thorazine.





3/26: Per RN, she had an episode of agitation again last night. This morning, 

she did admit to hearing the voice of her  mother yesterday. She also 

admits to seeing giraffes and an old woman in the room. Denies suicidal or 

homicidal ideation. Discussed with Dr. Rose, appreciate recommendations and 

possible need for inpatient psych placement. She is medically cleared from 

hospitalist standpoint for transfer to inpatient psych.





3/27: Patient slept better last night per RN although she still had episodes of 

restlessness this morning. Awaiting inpatient psych placement. Discussed with 

daughter on bedside and updated on patient status. She reiterates her mother has

a long standing history of depression and suicidal ideations in the past on top 

of her chronic alcoholism. Discussed her waxing and waning agitation and 

occasional hallucinations are likely multifactorial from her pysch issues and 

possible chronic cognitive effects of heavy alcoholism. Daughter did say that 

they were recommended to follow up and see a neuropsychologist outpatient but th

at it has been extremely difficult for patient to follow through these 

recommendations due to her behavioral issues. 





3/28: Patient is less agitated today but still has restlessness. She has been 

free of restraints for more than 24 hrs now. She continues to report that she 

was hearing her grandmother's voice last night. She awaits inpatient psych 

placement.








(2) Acute kidney failure


Is this a current diagnosis for this admission?: Yes   


Plan: 


Resolved with IV fluids.








(3) Alcohol withdrawal


Is this a current diagnosis for this admission?: Yes   


Plan: 


Resolved.

## 2019-03-29 RX ADMIN — Medication SCH: at 06:48

## 2019-03-29 RX ADMIN — HYDROXYZINE PAMOATE SCH MG: 25 CAPSULE ORAL at 13:59

## 2019-03-29 RX ADMIN — HYDROXYZINE PAMOATE SCH MG: 25 CAPSULE ORAL at 19:34

## 2019-03-29 RX ADMIN — BUSPIRONE HYDROCHLORIDE SCH MG: 10 TABLET ORAL at 19:33

## 2019-03-29 RX ADMIN — HALOPERIDOL SCH MG: 5 TABLET ORAL at 10:04

## 2019-03-29 RX ADMIN — DEXAMETHASONE SODIUM PHOSPHATE PRN MG: 10 INJECTION INTRAMUSCULAR; INTRAVENOUS at 09:07

## 2019-03-29 RX ADMIN — POLYETHYLENE GLYCOL 3350 SCH: 17 POWDER, FOR SOLUTION ORAL at 10:04

## 2019-03-29 RX ADMIN — FOLIC ACID SCH MG: 1 TABLET ORAL at 10:03

## 2019-03-29 RX ADMIN — LORAZEPAM PRN MG: 2 INJECTION INTRAMUSCULAR; INTRAVENOUS at 00:20

## 2019-03-29 RX ADMIN — GABAPENTIN SCH MG: 400 CAPSULE ORAL at 13:43

## 2019-03-29 RX ADMIN — LORAZEPAM PRN MG: 2 INJECTION INTRAMUSCULAR; INTRAVENOUS at 13:57

## 2019-03-29 RX ADMIN — HALOPERIDOL SCH MG: 5 TABLET ORAL at 21:34

## 2019-03-29 RX ADMIN — Medication SCH MG: at 13:54

## 2019-03-29 RX ADMIN — GABAPENTIN SCH: 400 CAPSULE ORAL at 06:48

## 2019-03-29 RX ADMIN — NICOTINE SCH EACH: 21 PATCH, EXTENDED RELEASE TOPICAL at 10:11

## 2019-03-29 RX ADMIN — PROPRANOLOL HYDROCHLORIDE SCH MG: 20 TABLET ORAL at 10:11

## 2019-03-29 RX ADMIN — BUSPIRONE HYDROCHLORIDE SCH MG: 10 TABLET ORAL at 10:03

## 2019-03-29 RX ADMIN — HEPARIN SODIUM SCH UNIT: 5000 INJECTION, SOLUTION INTRAVENOUS; SUBCUTANEOUS at 13:49

## 2019-03-29 RX ADMIN — AMLODIPINE BESYLATE SCH MG: 10 TABLET ORAL at 10:03

## 2019-03-29 RX ADMIN — HYDROXYZINE PAMOATE SCH MG: 25 CAPSULE ORAL at 10:03

## 2019-03-29 RX ADMIN — BENZTROPINE MESYLATE SCH MG: 1 TABLET ORAL at 10:03

## 2019-03-29 RX ADMIN — QUETIAPINE FUMARATE SCH MG: 25 TABLET, FILM COATED ORAL at 10:12

## 2019-03-29 RX ADMIN — LORAZEPAM PRN MG: 2 INJECTION INTRAMUSCULAR; INTRAVENOUS at 10:03

## 2019-03-29 RX ADMIN — PROPRANOLOL HYDROCHLORIDE SCH MG: 20 TABLET ORAL at 21:34

## 2019-03-29 RX ADMIN — Medication SCH ML: at 13:49

## 2019-03-29 RX ADMIN — GABAPENTIN SCH MG: 400 CAPSULE ORAL at 21:34

## 2019-03-29 RX ADMIN — HEPARIN SODIUM SCH: 5000 INJECTION, SOLUTION INTRAVENOUS; SUBCUTANEOUS at 05:13

## 2019-03-29 RX ADMIN — HEPARIN SODIUM SCH UNIT: 5000 INJECTION, SOLUTION INTRAVENOUS; SUBCUTANEOUS at 21:34

## 2019-03-29 RX ADMIN — Medication SCH ML: at 21:34

## 2019-03-29 RX ADMIN — Medication SCH MG: at 21:34

## 2019-03-29 RX ADMIN — QUETIAPINE FUMARATE SCH MG: 25 TABLET, FILM COATED ORAL at 21:34

## 2019-03-29 RX ADMIN — HYDROXYZINE PAMOATE SCH: 25 CAPSULE ORAL at 23:40

## 2019-03-29 NOTE — PSYCHOLOGICAL NOTE
Psych Note





- Psych Note


Date seen by psych provider: 03/29/19


Psych Note: 





Diagnoses:


303.1 (F33.20) Alcohol Use Disorder, Moderate


291.89 (F10.24) Alcohol Induced Depressive Disorder, Severe


R/O Neurodegenerative issues as a result of long term alcohol use





Medication recommendations made by the psychiatric medical provider, Dr. Sebastien MD., includes:





Discontinue Seroquel 


Discontinue Cymbalta 


Discontinue Ativan 


Discontinue Valium


Discontinue Requip


Discontinue Zyprexa


Discontinue Visteral


Continue Haldol 5mg twice daily


Discontinue haldol 1mg every 4 hours as needed


Increase Thorazine 50 mg every 8 hours as needed


Decrease Buspar to 5mg twice daily


Continue Cogentin 1 mg daily


Add Clonidine 0.2 mg 12-hour patch





Please note the recommendations that are not highlighted are reflected in the 

current MAR





Attempts at finding inpatient psychiatric treatment has been unsuccessful due to

the patient being unable to do her ADLs

## 2019-03-29 NOTE — PDOC PROGRESS REPORT
Subjective


Progress Note for:: 19


Subjective:: 


This is a 58 years old  female patient with past medical history of 

depression and alcohol abuse who was brought by EMS for being suicidal and 

alcohol intoxication.  





She was initially IVCed which was subsequently rescinded.





Her stay has been prolonged due to waxing and waning but persistent 

hallucination and agitations.





3/26: Per RN, she had an episode of agitation again last night. This morning, 

she did admit to hearing the voice of her  mother yesterday. She also 

admits to seeing giraffes and an old woman in the room. Denies suicidal or ho

micidal ideation. Discussed with Dr. Rose, appreciate recommendations and 

possible need for inpatient psych placement. She is medically cleared from 

hospitalist standpoint for transfer to inpatient psych.





3/27: Patient slept better last night per RN although she still had episodes of 

restlessness this morning. Awaiting inpatient psych placement. Discussed with 

daughter on bedside and updated on patient status. She reiterates her mother has

a long standing history of depression and suicidal ideations in the past on top 

of her chronic alcoholism. Discussed her waxing and waning agitation and 

occasional hallucinations are likely multifactorial from her pysch issues and 

possible chronic cognitive effects of heavy alcoholism. Daughter did say that 

they were recommended to follow up and see a neuropsychologist outpatient but 

that it has been extremely difficult for patient to follow through these 

recommendations due to her behavioral issues. 





3/28: Patient is less agitated today but still has restlessness. She has been 

free of restraints for more than 24 hrs now. She continues to report that she 

was hearing her grandmother's voice last night. She awaits inpatient psych 

placement.





3/29: No acute issue overnight. Patient has been less agitated. She is able to 

do feed herself and is also able to wash herself in the bathroom. She is 

awaiting inpatient psych placement.


Reason For Visit: 


PANCREATITIS, ETOH INTOXICATION








Physical Exam


Vital Signs: 


                                        











Temp Pulse Resp BP Pulse Ox


 


 98.3 F   120 H  17   132/74 H  83 L


 


 19 06:33  19 06:33  19 06:33  19 06:33  19 06:33








                                 Intake & Output











 19





 06:59 06:59 06:59


 


Intake Total  360 500


 


Balance  360 500


 


Weight 136 lb 3.931 oz 139 lb 8.842 oz 











General appearance: PRESENT: no acute distress, well-developed, well-nourished


Head exam: PRESENT: atraumatic, normocephalic


Eye exam: PRESENT: conjunctiva pink, EOMI, PERRLA.  ABSENT: scleral icterus


Ear exam: PRESENT: normal external ear exam


Mouth exam: PRESENT: moist, tongue midline


Neck exam: ABSENT: carotid bruit, JVD, lymphadenopathy, thyromegaly


Respiratory exam: PRESENT: clear to auscultation hill.  ABSENT: rales, rhonchi, 

wheezes


Cardiovascular exam: PRESENT: RRR.  ABSENT: diastolic murmur, rubs, systolic 

murmur


Pulses: PRESENT: normal dorsalis pedis pul


Vascular exam: PRESENT: normal capillary refill


GI/Abdominal exam: PRESENT: normal bowel sounds, soft.  ABSENT: distended, 

guarding, mass, organolmegaly, rebound, tenderness


Rectal exam: PRESENT: deferred


Neurological exam: PRESENT: alert, awake, oriented to person, oriented to place,

oriented to time, oriented to situation, CN II-XII grossly intact.  ABSENT: 

motor sensory deficit





Results


Laboratory Results: 


                                        





                                 19 05:33 





                                 19 05:33 





                                        











  19





  11:47


 


Troponin I  < 0.012











Impressions: 


                                        





Abdomen/Pelvis CT  19 11:42


IMPRESSION:  Fatty liver


Gallstones


Peripancreatic retroperitoneal inflammation, question pancreatitis


 








Cervical Spine CT  19 11:42


IMPRESSION:  CHRONIC DEGENERATIVE CHANGES.  NO ACUTE FINDINGS. Similar left 

thyroid nodule with calcifications.


 








Chest CT  19 11:42


IMPRESSION:  No acute changes


 








Head CT  19 11:42


IMPRESSION:  No acute intracranial findings.


EVIDENCE OF ACUTE STROKE: NO.


 














Assessment and Plan





- Diagnosis


(1) Psychosis


Is this a current diagnosis for this admission?: Yes   


Plan: 


Discussed with Dr. Rose. Will switch Zyprexa to PO Haldol. DC ropinirole. 

Will also add prn thorazine.





3/26: Per RN, she had an episode of agitation again last night. This morning, 

she did admit to hearing the voice of her  mother yesterday. She also 

admits to seeing giraffes and an old woman in the room. Denies suicidal or 

homicidal ideation. Discussed with Dr. Rose, appreciate recommendations and 

possible need for inpatient psych placement. She is medically cleared from 

hospitalist standpoint for transfer to inpatient psych.





3/27: Patient slept better last night per RN although she still had episodes of 

restlessness this morning. Awaiting inpatient psych placement. Discussed with 

daughter on bedside and updated on patient status. She reiterates her mother has

a long standing history of depression and suicidal ideations in the past on top 

of her chronic alcoholism. Discussed her waxing and waning agitation and 

occasional hallucinations are likely multifactorial from her pysch issues and 

possible chronic cognitive effects of heavy alcoholism. Daughter did say that 

they were recommended to follow up and see a neuropsychologist outpatient but 

that it has been extremely difficult for patient to follow through these 

recommendations due to her behavioral issues. 





3/28: Patient is less agitated today but still has restlessness. She has been 

free of restraints for more than 24 hrs now. She continues to report that she 

was hearing her grandmother's voice last night. She awaits inpatient psych pl

acement.





3/29: No acute issue overnight. Patient has been less agitated. She is able to 

do feed herself and is also able to wash herself in the bathroom. She is 

awaiting inpatient psych placement.








(2) Acute kidney failure


Is this a current diagnosis for this admission?: Yes   


Plan: 


Resolved with IV fluids.








(3) Alcohol withdrawal


Is this a current diagnosis for this admission?: Yes   


Plan: 


Resolved.








- Time


Time Spent with patient: 15-24 minutes

## 2019-03-30 RX ADMIN — BENZTROPINE MESYLATE SCH MG: 1 TABLET ORAL at 09:27

## 2019-03-30 RX ADMIN — FOLIC ACID SCH MG: 1 TABLET ORAL at 09:27

## 2019-03-30 RX ADMIN — HYDROXYZINE PAMOATE SCH MG: 25 CAPSULE ORAL at 23:17

## 2019-03-30 RX ADMIN — HEPARIN SODIUM SCH UNIT: 5000 INJECTION, SOLUTION INTRAVENOUS; SUBCUTANEOUS at 13:38

## 2019-03-30 RX ADMIN — QUETIAPINE FUMARATE SCH MG: 25 TABLET, FILM COATED ORAL at 09:27

## 2019-03-30 RX ADMIN — Medication SCH MG: at 13:38

## 2019-03-30 RX ADMIN — BUSPIRONE HYDROCHLORIDE SCH MG: 10 TABLET ORAL at 17:03

## 2019-03-30 RX ADMIN — Medication SCH MG: at 07:00

## 2019-03-30 RX ADMIN — BUSPIRONE HYDROCHLORIDE SCH MG: 10 TABLET ORAL at 09:27

## 2019-03-30 RX ADMIN — GABAPENTIN SCH MG: 400 CAPSULE ORAL at 13:38

## 2019-03-30 RX ADMIN — Medication SCH ML: at 13:39

## 2019-03-30 RX ADMIN — HYDROXYZINE PAMOATE SCH MG: 25 CAPSULE ORAL at 09:27

## 2019-03-30 RX ADMIN — HYDROXYZINE PAMOATE SCH MG: 25 CAPSULE ORAL at 13:38

## 2019-03-30 RX ADMIN — HYDROXYZINE PAMOATE SCH MG: 25 CAPSULE ORAL at 17:03

## 2019-03-30 RX ADMIN — HEPARIN SODIUM SCH UNIT: 5000 INJECTION, SOLUTION INTRAVENOUS; SUBCUTANEOUS at 23:16

## 2019-03-30 RX ADMIN — GABAPENTIN SCH MG: 400 CAPSULE ORAL at 23:16

## 2019-03-30 RX ADMIN — Medication SCH MG: at 23:18

## 2019-03-30 RX ADMIN — AMLODIPINE BESYLATE SCH MG: 10 TABLET ORAL at 09:26

## 2019-03-30 RX ADMIN — HALOPERIDOL SCH MG: 5 TABLET ORAL at 23:16

## 2019-03-30 RX ADMIN — POLYETHYLENE GLYCOL 3350 SCH: 17 POWDER, FOR SOLUTION ORAL at 09:27

## 2019-03-30 RX ADMIN — GABAPENTIN SCH MG: 400 CAPSULE ORAL at 07:28

## 2019-03-30 RX ADMIN — NICOTINE SCH EACH: 21 PATCH, EXTENDED RELEASE TOPICAL at 09:26

## 2019-03-30 RX ADMIN — ACETAMINOPHEN PRN MG: 325 TABLET ORAL at 17:03

## 2019-03-30 RX ADMIN — HEPARIN SODIUM SCH UNIT: 5000 INJECTION, SOLUTION INTRAVENOUS; SUBCUTANEOUS at 07:27

## 2019-03-30 RX ADMIN — DEXAMETHASONE SODIUM PHOSPHATE PRN MG: 10 INJECTION INTRAMUSCULAR; INTRAVENOUS at 01:15

## 2019-03-30 RX ADMIN — PROPRANOLOL HYDROCHLORIDE SCH MG: 20 TABLET ORAL at 23:16

## 2019-03-30 RX ADMIN — Medication SCH ML: at 07:00

## 2019-03-30 RX ADMIN — PROPRANOLOL HYDROCHLORIDE SCH MG: 20 TABLET ORAL at 09:27

## 2019-03-30 RX ADMIN — HALOPERIDOL SCH MG: 5 TABLET ORAL at 09:27

## 2019-03-30 RX ADMIN — Medication SCH ML: at 23:18

## 2019-03-30 RX ADMIN — QUETIAPINE FUMARATE SCH MG: 25 TABLET, FILM COATED ORAL at 23:17

## 2019-03-30 NOTE — PDOC PROGRESS REPORT
Subjective


Progress Note for:: 19


Subjective:: 


This is a 58 years old  female patient with past medical history of 

depression and alcohol abuse who was brought by EMS for being suicidal and 

alcohol intoxication.  





She was initially IVCed which was subsequently rescinded.





Her stay has been prolonged due to waxing and waning but persistent 

hallucination and agitations.





3/26: Per RN, she had an episode of agitation again last night. This morning, 

she did admit to hearing the voice of her  mother yesterday. She also 

admits to seeing giraffes and an old woman in the room. Denies suicidal or ho

micidal ideation. Discussed with Dr. Rose, appreciate recommendations and 

possible need for inpatient psych placement. She is medically cleared from 

hospitalist standpoint for transfer to inpatient psych.





3/27: Patient slept better last night per RN although she still had episodes of 

restlessness this morning. Awaiting inpatient psych placement. Discussed with 

daughter on bedside and updated on patient status. She reiterates her mother has

a long standing history of depression and suicidal ideations in the past on top 

of her chronic alcoholism. Discussed her waxing and waning agitation and 

occasional hallucinations are likely multifactorial from her pysch issues and 

possible chronic cognitive effects of heavy alcoholism. Daughter did say that 

they were recommended to follow up and see a neuropsychologist outpatient but 

that it has been extremely difficult for patient to follow through these 

recommendations due to her behavioral issues. 





3/28: Patient is less agitated today but still has restlessness. She has been 

free of restraints for more than 24 hrs now. She continues to report that she 

was hearing her grandmother's voice last night. She awaits inpatient psych 

placement.





3/29: Patient has been less agitated. She is able to do feed herself and is also

able to wash herself in the bathroom. 





3/30: No acute issue overnight. She continues to be less restless and agitated a

nd is capable of doing her ADLs. She does have occasional hallucinations. This 

morning, she says she was "trying to wake her sister up sleeping beside her". 

She is still awaiting inpatient psych placement.





Reason For Visit: 


PANCREATITIS, ETOH INTOXICATION








Physical Exam


Vital Signs: 


                                        











Temp Pulse Resp BP Pulse Ox


 


 98.6 F   92   16   103/55 L  97 


 


 19 16:00  19 16:00  19 16:00  19 16:00  19 16:00








                                 Intake & Output











 19





 06:59 06:59 06:59


 


Intake Total 360 500 240


 


Balance 360 500 240


 


Weight 139 lb 8.842 oz 139 lb 1.787 oz 











General appearance: PRESENT: no acute distress, well-developed, well-nourished


Head exam: PRESENT: atraumatic, normocephalic


Eye exam: PRESENT: conjunctiva pink, EOMI, PERRLA.  ABSENT: scleral icterus


Ear exam: PRESENT: normal external ear exam


Mouth exam: PRESENT: moist, tongue midline


Neck exam: ABSENT: carotid bruit, JVD, lymphadenopathy, thyromegaly


Respiratory exam: PRESENT: clear to auscultation hill.  ABSENT: rales, rhonchi, 

wheezes


Cardiovascular exam: PRESENT: RRR.  ABSENT: diastolic murmur, rubs, systolic 

murmur


Pulses: PRESENT: normal dorsalis pedis pul


GI/Abdominal exam: PRESENT: normal bowel sounds, soft.  ABSENT: distended, 

guarding, mass, organolmegaly, rebound, tenderness


Rectal exam: PRESENT: deferred


Neurological exam: PRESENT: alert, awake, oriented to person, oriented to place,

oriented to time, oriented to situation, CN II-XII grossly intact.  ABSENT: 

motor sensory deficit





Results


Laboratory Results: 


                                        





                                 19 05:33 





                                 19 05:33 





                                        











  19





  11:47


 


Troponin I  < 0.012











Impressions: 


                                        





Abdomen/Pelvis CT  19 11:42


IMPRESSION:  Fatty liver


Gallstones


Peripancreatic retroperitoneal inflammation, question pancreatitis


 








Cervical Spine CT  19 11:42


IMPRESSION:  CHRONIC DEGENERATIVE CHANGES.  NO ACUTE FINDINGS. Similar left 

thyroid nodule with calcifications.


 








Chest CT  19 11:42


IMPRESSION:  No acute changes


 








Head CT  19 11:42


IMPRESSION:  No acute intracranial findings.


EVIDENCE OF ACUTE STROKE: NO.


 














Assessment and Plan





- Diagnosis


(1) Psychosis


Is this a current diagnosis for this admission?: Yes   


Plan: 


Discussed with Dr. Rose. Will switch Zyprexa to PO Haldol. DC ropinirole. Wi

ll also add prn thorazine.





3/26: Per RN, she had an episode of agitation again last night. This morning, 

she did admit to hearing the voice of her  mother yesterday. She also 

admits to seeing giraffes and an old woman in the room. Denies suicidal or 

homicidal ideation. Discussed with Dr. Rose, appreciate recommendations and 

possible need for inpatient psych placement. She is medically cleared from 

hospitalist standpoint for transfer to inpatient psych.





3/27: Patient slept better last night per RN although she still had episodes of 

restlessness this morning. Awaiting inpatient psych placement. Discussed with 

daughter on bedside and updated on patient status. She reiterates her mother has

a long standing history of depression and suicidal ideations in the past on top 

of her chronic alcoholism. Discussed her waxing and waning agitation and 

occasional hallucinations are likely multifactorial from her pysch issues and 

possible chronic cognitive effects of heavy alcoholism. Daughter did say that 

they were recommended to follow up and see a neuropsychologist outpatient but 

that it has been extremely difficult for patient to follow through these 

recommendations due to her behavioral issues. 





3/28: Patient is less agitated today but still has restlessness. She has been 

free of restraints for more than 24 hrs now. She continues to report that she 

was hearing her grandmother's voice last night. She awaits inpatient psych 

placement.





3/30: She continues to be less restless and agitated and is capable of doing her

ADLs. She does have occasional hallucinations. This morning, she says she was 

"trying to wake her sister up sleeping beside her". She is still awaiting 

inpatient psych placement.














(2) Acute kidney failure


Is this a current diagnosis for this admission?: Yes   


Plan: 


Resolved with IV fluids.








(3) Alcohol withdrawal


Is this a current diagnosis for this admission?: Yes   


Plan: 


Resolved.








- Time


Time Spent with patient: 15-24 minutes

## 2019-03-31 RX ADMIN — Medication SCH ML: at 21:15

## 2019-03-31 RX ADMIN — POLYETHYLENE GLYCOL 3350 SCH: 17 POWDER, FOR SOLUTION ORAL at 09:05

## 2019-03-31 RX ADMIN — HYDROXYZINE PAMOATE SCH MG: 25 CAPSULE ORAL at 13:20

## 2019-03-31 RX ADMIN — HEPARIN SODIUM SCH UNIT: 5000 INJECTION, SOLUTION INTRAVENOUS; SUBCUTANEOUS at 21:16

## 2019-03-31 RX ADMIN — Medication SCH MG: at 13:22

## 2019-03-31 RX ADMIN — HALOPERIDOL SCH MG: 5 TABLET ORAL at 09:06

## 2019-03-31 RX ADMIN — BUSPIRONE HYDROCHLORIDE SCH MG: 10 TABLET ORAL at 09:06

## 2019-03-31 RX ADMIN — Medication SCH MG: at 05:16

## 2019-03-31 RX ADMIN — HEPARIN SODIUM SCH UNIT: 5000 INJECTION, SOLUTION INTRAVENOUS; SUBCUTANEOUS at 13:21

## 2019-03-31 RX ADMIN — LORAZEPAM PRN MG: 2 INJECTION INTRAMUSCULAR; INTRAVENOUS at 06:42

## 2019-03-31 RX ADMIN — FOLIC ACID SCH MG: 1 TABLET ORAL at 09:06

## 2019-03-31 RX ADMIN — HYDROXYZINE PAMOATE SCH MG: 25 CAPSULE ORAL at 21:14

## 2019-03-31 RX ADMIN — Medication SCH MG: at 21:14

## 2019-03-31 RX ADMIN — QUETIAPINE FUMARATE SCH MG: 25 TABLET, FILM COATED ORAL at 21:14

## 2019-03-31 RX ADMIN — HYDROXYZINE PAMOATE SCH MG: 25 CAPSULE ORAL at 17:05

## 2019-03-31 RX ADMIN — HYDROXYZINE PAMOATE SCH MG: 25 CAPSULE ORAL at 09:06

## 2019-03-31 RX ADMIN — AMLODIPINE BESYLATE SCH MG: 10 TABLET ORAL at 09:05

## 2019-03-31 RX ADMIN — QUETIAPINE FUMARATE SCH MG: 25 TABLET, FILM COATED ORAL at 09:05

## 2019-03-31 RX ADMIN — GABAPENTIN SCH MG: 400 CAPSULE ORAL at 21:14

## 2019-03-31 RX ADMIN — BENZTROPINE MESYLATE SCH MG: 1 TABLET ORAL at 09:06

## 2019-03-31 RX ADMIN — GABAPENTIN SCH MG: 400 CAPSULE ORAL at 13:20

## 2019-03-31 RX ADMIN — NICOTINE SCH EACH: 21 PATCH, EXTENDED RELEASE TOPICAL at 09:06

## 2019-03-31 RX ADMIN — HEPARIN SODIUM SCH UNIT: 5000 INJECTION, SOLUTION INTRAVENOUS; SUBCUTANEOUS at 05:15

## 2019-03-31 RX ADMIN — Medication SCH ML: at 05:16

## 2019-03-31 RX ADMIN — PROPRANOLOL HYDROCHLORIDE SCH MG: 20 TABLET ORAL at 09:06

## 2019-03-31 RX ADMIN — PROPRANOLOL HYDROCHLORIDE SCH MG: 20 TABLET ORAL at 21:14

## 2019-03-31 RX ADMIN — GABAPENTIN SCH MG: 400 CAPSULE ORAL at 05:16

## 2019-03-31 RX ADMIN — Medication SCH ML: at 13:21

## 2019-03-31 RX ADMIN — BUSPIRONE HYDROCHLORIDE SCH MG: 10 TABLET ORAL at 17:05

## 2019-03-31 RX ADMIN — ACETAMINOPHEN PRN MG: 325 TABLET ORAL at 13:20

## 2019-03-31 RX ADMIN — HALOPERIDOL SCH MG: 5 TABLET ORAL at 21:14

## 2019-03-31 NOTE — PDOC PROGRESS REPORT
Subjective


Progress Note for:: 19


Subjective:: 


This is a 58 years old  female patient with past medical history of 

depression and alcohol abuse who was brought by EMS for being suicidal and 

alcohol intoxication.  





She was initially IVCed which was subsequently rescinded.





Her stay has been prolonged due to waxing and waning but persistent 

hallucination and agitations.





3/26: Per RN, she had an episode of agitation again last night. This morning, 

she did admit to hearing the voice of her  mother yesterday. She also 

admits to seeing giraffes and an old woman in the room. Denies suicidal or ho

micidal ideation. Discussed with Dr. Rose, appreciate recommendations and 

possible need for inpatient psych placement. She is medically cleared from 

hospitalist standpoint for transfer to inpatient psych.





3/27: Patient slept better last night per RN although she still had episodes of 

restlessness this morning. Awaiting inpatient psych placement. Discussed with 

daughter on bedside and updated on patient status. She reiterates her mother has

a long standing history of depression and suicidal ideations in the past on top 

of her chronic alcoholism. Discussed her waxing and waning agitation and 

occasional hallucinations are likely multifactorial from her pysch issues and 

possible chronic cognitive effects of heavy alcoholism. Daughter did say that 

they were recommended to follow up and see a neuropsychologist outpatient but 

that it has been extremely difficult for patient to follow through these 

recommendations due to her behavioral issues. 





3/28: Patient is less agitated today but still has restlessness. She has been 

free of restraints for more than 24 hrs now. She continues to report that she 

was hearing her grandmother's voice last night. She awaits inpatient psych 

placement.





3/29: Patient has been less agitated. She is able to do feed herself and is also

able to wash herself in the bathroom. 





3/30: She continues to be less restless and agitated and is capable of doing her

ADLs. She does have occasional hallucinations. This morning, she says she was 

"trying to wake her sister up sleeping beside her". 





3/31: No acute issue overnight. No agitation overnight. She continues to do well

but does have occasional episodes of being teary/crying and being emotional when

talking about certain events in the past. She has been ambulating well on the 

hallway. She is still awaiting inpatient psych placement.


Reason For Visit: 


PANCREATITIS, ETOH INTOXICATION








Physical Exam


Vital Signs: 


                                        











Temp Pulse Resp BP Pulse Ox


 


 97.7 F   84   16   107/70   98 


 


 19 11:59  19 11:59  19 11:59  19 11:59  19 11:59








                                 Intake & Output











 19





 06:59 06:59 06:59


 


Intake Total 500 1153 474


 


Balance 500 1153 474


 


Weight 139 lb 1.787 oz 140 lb 10.479 oz 











General appearance: PRESENT: no acute distress, well-developed, well-nourished


Head exam: PRESENT: atraumatic, normocephalic


Eye exam: PRESENT: conjunctiva pink, EOMI, PERRLA.  ABSENT: scleral icterus


Ear exam: PRESENT: normal external ear exam


Mouth exam: PRESENT: moist, tongue midline


Neck exam: ABSENT: carotid bruit, JVD, lymphadenopathy, thyromegaly


Respiratory exam: PRESENT: clear to auscultation hill.  ABSENT: rales, rhonchi, 

wheezes


Cardiovascular exam: PRESENT: RRR.  ABSENT: diastolic murmur, rubs, systolic 

murmur


Pulses: PRESENT: normal dorsalis pedis pul


GI/Abdominal exam: PRESENT: normal bowel sounds, soft.  ABSENT: distended, 

guarding, mass, organolmegaly, rebound, tenderness


Rectal exam: PRESENT: deferred


Neurological exam: PRESENT: alert, awake, oriented to person, oriented to place,

oriented to time, oriented to situation, CN II-XII grossly intact.  ABSENT: 

motor sensory deficit





Results


Laboratory Results: 


                                        





                                 19 05:33 





                                 19 05:33 





                                        











  19





  11:47


 


Troponin I  < 0.012











Impressions: 


                                        





Abdomen/Pelvis CT  19 11:42


IMPRESSION:  Fatty liver


Gallstones


Peripancreatic retroperitoneal inflammation, question pancreatitis


 








Cervical Spine CT  19 11:42


IMPRESSION:  CHRONIC DEGENERATIVE CHANGES.  NO ACUTE FINDINGS. Similar left 

thyroid nodule with calcifications.


 








Chest CT  19 11:42


IMPRESSION:  No acute changes


 








Head CT  19 11:42


IMPRESSION:  No acute intracranial findings.


EVIDENCE OF ACUTE STROKE: NO.


 














Assessment and Plan





- Diagnosis


(1) Psychosis


Is this a current diagnosis for this admission?: Yes   


Plan: 


Discussed with Dr. Rose. Will switch Zyprexa to PO Haldol. DC ropinirole. 

Will also add prn thorazine.





3/26: Per RN, she had an episode of agitation again last night. This morning, 

she did admit to hearing the voice of her  mother yesterday. She also 

admits to seeing giraffes and an old woman in the room. Denies suicidal or 

homicidal ideation. Discussed with Dr. Rose, appreciate recommendations and 

possible need for inpatient psych placement. She is medically cleared from 

hospitalist standpoint for transfer to inpatient psych.





3/27: Patient slept better last night per RN although she still had episodes of 

restlessness this morning. Awaiting inpatient psych placement. Discussed with 

daughter on bedside and updated on patient status. She reiterates her mother has

a long standing history of depression and suicidal ideations in the past on top 

of her chronic alcoholism. Discussed her waxing and waning agitation and 

occasional hallucinations are likely multifactorial from her pysch issues and 

possible chronic cognitive effects of heavy alcoholism. Daughter did say that 

they were recommended to follow up and see a neuropsychologist outpatient but 

that it has been extremely difficult for patient to follow through these recom

mendations due to her behavioral issues. 





3/28: Patient is less agitated today but still has restlessness. She has been 

free of restraints for more than 24 hrs now. She continues to report that she w

as hearing her grandmother's voice last night. She awaits inpatient psych 

placement.





3/30: She continues to be less restless and agitated and is capable of doing her

ADLs. She does have occasional hallucinations. This morning, she says she was 

"trying to wake her sister up sleeping beside her". She is still awaiting 

inpatient psych placement.





3/31: No agitation overnight. She continues to do well but does have occasional 

episodes of being teary/crying and being emotional when talking about certain 

events in the past. She has been ambulating well on the hallway. She is still 

awaiting inpatient psych placement.








(2) Alcohol withdrawal


Is this a current diagnosis for this admission?: Yes   


Plan: 


Resolved.








(3) Acute kidney failure


Is this a current diagnosis for this admission?: Yes   


Plan: 


Resolved with IV fluids.








- Time


Time Spent with patient: 15-24 minutes

## 2019-03-31 NOTE — PSYCHOLOGICAL NOTE
Psych Note





- Psych Note


Date seen by psych provider: 03/31/19


Time seen by psych provider: 15:20


Psych Note: 





Diagnoses:


303.1 (F33.20) Alcohol Use Disorder, Moderate


291.89 (F10.24) Alcohol Induced Depressive Disorder, Severe


R/O Neurodegenerative issues as a result of long term alcohol use





Medication recommendations made by the psychiatric medical provider, Dr. Sebastien MD., includes:





Discontinue Seroquel 


Discontinue Cymbalta 


Discontinue Ativan 


Discontinue Valium


Discontinue Requip


Discontinue Zyprexa


Discontinue Visteral


Continue Haldol 5mg twice daily


Discontinue haldol 1mg every 4 hours as needed


Increase Thorazine 50 mg every 8 hours as needed


Decrease Buspar to 5mg twice daily


Continue Cogentin 1 mg daily


Add Clonidine 0.2 mg 12-hour patch





Please note the recommendations that are not highlighted are reflected in the 

current MAR





Attempts at finding inpatient psychiatric treatment has been unsuccessful due to

the patient being unable to do her ADLs unassisted (patient needs to be able to 

complete ADLs without assist devices ie walker, cane etc). Due to ongoing 

denials from multiple psychiatric facilities due to acuity, unable to complete 

unassisted ADLs, and the attending physician's preference to treatment patient 

with own prescribing protocol verse that of the consulting psychiatrist, the 

psychiatric team will no longer be involved in this case.  Please re-consult 

when patient is more appropriate for psychiatric placement or the attending 

physician wishes to re-visit the consulting psychiatrist medication 

recommendations.  was consulted on the care and management of this 

patient.

## 2019-04-01 RX ADMIN — POLYETHYLENE GLYCOL 3350 SCH: 17 POWDER, FOR SOLUTION ORAL at 09:12

## 2019-04-01 RX ADMIN — HALOPERIDOL SCH MG: 5 TABLET ORAL at 22:55

## 2019-04-01 RX ADMIN — Medication SCH MG: at 05:14

## 2019-04-01 RX ADMIN — GABAPENTIN SCH MG: 400 CAPSULE ORAL at 14:00

## 2019-04-01 RX ADMIN — HEPARIN SODIUM SCH UNIT: 5000 INJECTION, SOLUTION INTRAVENOUS; SUBCUTANEOUS at 22:55

## 2019-04-01 RX ADMIN — BENZTROPINE MESYLATE SCH MG: 1 TABLET ORAL at 09:11

## 2019-04-01 RX ADMIN — QUETIAPINE FUMARATE SCH MG: 25 TABLET, FILM COATED ORAL at 22:55

## 2019-04-01 RX ADMIN — HEPARIN SODIUM SCH: 5000 INJECTION, SOLUTION INTRAVENOUS; SUBCUTANEOUS at 13:58

## 2019-04-01 RX ADMIN — HYDROXYZINE PAMOATE SCH MG: 25 CAPSULE ORAL at 17:21

## 2019-04-01 RX ADMIN — PROPRANOLOL HYDROCHLORIDE SCH MG: 20 TABLET ORAL at 09:12

## 2019-04-01 RX ADMIN — LORAZEPAM PRN MG: 2 INJECTION INTRAMUSCULAR; INTRAVENOUS at 05:12

## 2019-04-01 RX ADMIN — QUETIAPINE FUMARATE SCH MG: 25 TABLET, FILM COATED ORAL at 09:13

## 2019-04-01 RX ADMIN — BUSPIRONE HYDROCHLORIDE SCH MG: 10 TABLET ORAL at 17:21

## 2019-04-01 RX ADMIN — BUSPIRONE HYDROCHLORIDE SCH MG: 10 TABLET ORAL at 09:11

## 2019-04-01 RX ADMIN — HEPARIN SODIUM SCH UNIT: 5000 INJECTION, SOLUTION INTRAVENOUS; SUBCUTANEOUS at 05:13

## 2019-04-01 RX ADMIN — HYDROXYZINE PAMOATE SCH MG: 25 CAPSULE ORAL at 09:13

## 2019-04-01 RX ADMIN — GABAPENTIN SCH MG: 400 CAPSULE ORAL at 22:55

## 2019-04-01 RX ADMIN — PROPRANOLOL HYDROCHLORIDE SCH MG: 20 TABLET ORAL at 22:55

## 2019-04-01 RX ADMIN — AMLODIPINE BESYLATE SCH MG: 10 TABLET ORAL at 09:13

## 2019-04-01 RX ADMIN — HYDROXYZINE PAMOATE SCH MG: 25 CAPSULE ORAL at 14:00

## 2019-04-01 RX ADMIN — Medication SCH: at 13:57

## 2019-04-01 RX ADMIN — Medication SCH: at 22:55

## 2019-04-01 RX ADMIN — HALOPERIDOL SCH MG: 5 TABLET ORAL at 09:12

## 2019-04-01 RX ADMIN — Medication SCH ML: at 05:13

## 2019-04-01 RX ADMIN — HYDROXYZINE PAMOATE SCH MG: 25 CAPSULE ORAL at 22:55

## 2019-04-01 RX ADMIN — FOLIC ACID SCH MG: 1 TABLET ORAL at 09:12

## 2019-04-01 RX ADMIN — NICOTINE SCH EACH: 21 PATCH, EXTENDED RELEASE TOPICAL at 09:13

## 2019-04-01 RX ADMIN — GABAPENTIN SCH MG: 400 CAPSULE ORAL at 05:13

## 2019-04-01 NOTE — PDOC PROGRESS REPORT
Subjective


Progress Note for:: 19


Subjective:: 


This is a 58 years old  female patient with past medical history of 

depression and alcohol abuse who was brought by EMS for being suicidal and 

alcohol intoxication.  





She was initially IVCed which was subsequently rescinded.





Her stay has been prolonged due to waxing and waning but persistent 

hallucination and agitations.





3/26: Per RN, she had an episode of agitation again last night. This morning, 

she did admit to hearing the voice of her  mother yesterday. She also 

admits to seeing giraffes and an old woman in the room. Denies suicidal or ho

micidal ideation. Discussed with Dr. Rose, appreciate recommendations and 

possible need for inpatient psych placement. She is medically cleared from 

hospitalist standpoint for transfer to inpatient psych.





3/27: Patient slept better last night per RN although she still had episodes of 

restlessness this morning. Awaiting inpatient psych placement. Discussed with 

daughter on bedside and updated on patient status. She reiterates her mother has

a long standing history of depression and suicidal ideations in the past on top 

of her chronic alcoholism. Discussed her waxing and waning agitation and 

occasional hallucinations are likely multifactorial from her pysch issues and 

possible chronic cognitive effects of heavy alcoholism. Daughter did say that 

they were recommended to follow up and see a neuropsychologist outpatient but 

that it has been extremely difficult for patient to follow through these 

recommendations due to her behavioral issues. 





3/28: Patient is less agitated today but still has restlessness. She has been 

free of restraints for more than 24 hrs now. She continues to report that she 

was hearing her grandmother's voice last night. She awaits inpatient psych 

placement.





3/29: Patient has been less agitated. She is able to do feed herself and is also

able to wash herself in the bathroom. 





3/30: She continues to be less restless and agitated and is capable of doing her

ADLs. She does have occasional hallucinations. This morning, she says she was 

"trying to wake her sister up sleeping beside her". 





3/31: No agitation overnight. She continues to do well but does have occasional 

episodes of being teary/crying and being emotional when talking about certain 

events in the past. She has been ambulating well on the hallway. 





: No acute issue overnight. Discussed with Amauri from psych yesterday 

afternoon. They have been having difficulty finding an inpatient psych facility 

for her but that they will try sending referrals again. She is still awaiting 

inpatient psych placement.


Reason For Visit: 


PANCREATITIS, ETOH INTOXICATION








Physical Exam


Vital Signs: 


                                        











Temp Pulse Resp BP Pulse Ox


 


 98.1 F   86   16   93/50 L  97 


 


 19 07:22  19 07:22  19 07:22  19 07:22  19 07:22








                                 Intake & Output











 19





 06:59 06:59 06:59


 


Intake Total 1153 1046 650


 


Balance 1153 1046 650


 


Weight 140 lb 10.479 oz 139 lb 8.842 oz 











General appearance: PRESENT: no acute distress, well-developed, well-nourished


Head exam: PRESENT: atraumatic, normocephalic


Eye exam: PRESENT: conjunctiva pink, EOMI, PERRLA.  ABSENT: scleral icterus


Ear exam: PRESENT: normal external ear exam


Mouth exam: PRESENT: moist, tongue midline


Neck exam: ABSENT: carotid bruit, JVD, lymphadenopathy, thyromegaly


Respiratory exam: PRESENT: clear to auscultation hill.  ABSENT: rales, rhonchi, 

wheezes


Cardiovascular exam: PRESENT: RRR.  ABSENT: diastolic murmur, rubs, systolic 

murmur


Pulses: PRESENT: normal dorsalis pedis pul


GI/Abdominal exam: PRESENT: normal bowel sounds, soft.  ABSENT: distended, 

guarding, mass, organolmegaly, rebound, tenderness


Rectal exam: PRESENT: deferred


Neurological exam: PRESENT: alert, awake, oriented to person, oriented to place,

oriented to time, oriented to situation, CN II-XII grossly intact.  ABSENT: 

motor sensory deficit





Results


Laboratory Results: 


                                        





                                 19 05:33 





                                 19 05:33 





                                        











  19





  11:47


 


Troponin I  < 0.012











Impressions: 


                                        





Abdomen/Pelvis CT  19 11:42


IMPRESSION:  Fatty liver


Gallstones


Peripancreatic retroperitoneal inflammation, question pancreatitis


 








Cervical Spine CT  19 11:42


IMPRESSION:  CHRONIC DEGENERATIVE CHANGES.  NO ACUTE FINDINGS. Similar left 

thyroid nodule with calcifications.


 








Chest CT  19 11:42


IMPRESSION:  No acute changes


 








Head CT  19 11:42


IMPRESSION:  No acute intracranial findings.


EVIDENCE OF ACUTE STROKE: NO.


 














Assessment and Plan





- Diagnosis


(1) Psychosis


Is this a current diagnosis for this admission?: Yes   


Plan: 


Discussed with Dr. Rose. Will switch Zyprexa to PO Haldol. DC ropinirole. 

Will also add prn thorazine.





3/26: Per RN, she had an episode of agitation again last night. This morning, 

she did admit to hearing the voice of her  mother yesterday. She also 

admits to seeing giraffes and an old woman in the room. Denies suicidal or 

homicidal ideation. Discussed with Dr. Rose, appreciate recommendations and 

possible need for inpatient psych placement. She is medically cleared from 

hospitalist standpoint for transfer to inpatient psych.





3/27: Patient slept better last night per RN although she still had episodes of 

restlessness this morning. Awaiting inpatient psych placement. Discussed with 

daughter on bedside and updated on patient status. She reiterates her mother has

a long standing history of depression and suicidal ideations in the past on top 

of her chronic alcoholism. Discussed her waxing and waning agitation and 

occasional hallucinations are likely multifactorial from her pysch issues and 

possible chronic cognitive effects of heavy alcoholism. Daughter did say that 

they were recommended to follow up and see a neuropsychologist outpatient but 

that it has been extremely difficult for patient to follow through these 

recommendations due to her behavioral issues. 





3/28: Patient is less agitated today but still has restlessness. She has been 

free of restraints for more than 24 hrs now. She continues to report that she 

was hearing her grandmother's voice last night. She awaits inpatient psych 

placement.





3/30: She continues to be less restless and agitated and is capable of doing her

ADLs. She does have occasional hallucinations. This morning, she says she was 

"trying to wake her sister up sleeping beside her". She is still awaiting 

inpatient psych placement.





3/31: She continues to do well but does have occasional episodes of being 

teary/crying and being emotional when talking about certain events in the past. 

She has been ambulating well on the hallway. She is still awaiting inpatient 

psych placement.





: No acute issue overnight. Discussed with Amauri from psych yesterday 

afternoon. They have been having difficulty finding an inpatient psych facility 

for her but that they will try sending referrals again. She is still awaiting 

inpatient psych placement.








(2) Alcohol withdrawal


Is this a current diagnosis for this admission?: Yes   


Plan: 


Resolved.








(3) Acute kidney failure


Is this a current diagnosis for this admission?: Yes   


Plan: 


Resolved with IV fluids.








- Time


Time Spent with patient: 15-24 minutes

## 2019-04-02 RX ADMIN — HYDROXYZINE PAMOATE SCH MG: 25 CAPSULE ORAL at 13:03

## 2019-04-02 RX ADMIN — HEPARIN SODIUM SCH: 5000 INJECTION, SOLUTION INTRAVENOUS; SUBCUTANEOUS at 05:14

## 2019-04-02 RX ADMIN — HYDROXYZINE PAMOATE SCH MG: 25 CAPSULE ORAL at 21:15

## 2019-04-02 RX ADMIN — Medication SCH: at 05:14

## 2019-04-02 RX ADMIN — AMLODIPINE BESYLATE SCH MG: 10 TABLET ORAL at 09:05

## 2019-04-02 RX ADMIN — BUSPIRONE HYDROCHLORIDE SCH MG: 10 TABLET ORAL at 16:28

## 2019-04-02 RX ADMIN — HALOPERIDOL SCH MG: 5 TABLET ORAL at 09:04

## 2019-04-02 RX ADMIN — HYDROXYZINE PAMOATE SCH MG: 25 CAPSULE ORAL at 16:28

## 2019-04-02 RX ADMIN — GABAPENTIN SCH MG: 400 CAPSULE ORAL at 13:03

## 2019-04-02 RX ADMIN — HALOPERIDOL SCH MG: 5 TABLET ORAL at 21:15

## 2019-04-02 RX ADMIN — Medication SCH: at 21:18

## 2019-04-02 RX ADMIN — Medication SCH: at 13:03

## 2019-04-02 RX ADMIN — BUSPIRONE HYDROCHLORIDE SCH MG: 10 TABLET ORAL at 09:02

## 2019-04-02 RX ADMIN — HEPARIN SODIUM SCH: 5000 INJECTION, SOLUTION INTRAVENOUS; SUBCUTANEOUS at 21:22

## 2019-04-02 RX ADMIN — GABAPENTIN SCH MG: 400 CAPSULE ORAL at 21:15

## 2019-04-02 RX ADMIN — QUETIAPINE FUMARATE SCH MG: 25 TABLET, FILM COATED ORAL at 21:18

## 2019-04-02 RX ADMIN — HEPARIN SODIUM SCH: 5000 INJECTION, SOLUTION INTRAVENOUS; SUBCUTANEOUS at 13:03

## 2019-04-02 RX ADMIN — HYDROXYZINE PAMOATE SCH MG: 25 CAPSULE ORAL at 09:06

## 2019-04-02 RX ADMIN — BENZTROPINE MESYLATE SCH MG: 1 TABLET ORAL at 09:03

## 2019-04-02 RX ADMIN — PROPRANOLOL HYDROCHLORIDE SCH MG: 20 TABLET ORAL at 21:15

## 2019-04-02 RX ADMIN — POLYETHYLENE GLYCOL 3350 SCH: 17 POWDER, FOR SOLUTION ORAL at 09:04

## 2019-04-02 RX ADMIN — GABAPENTIN SCH: 400 CAPSULE ORAL at 06:34

## 2019-04-02 RX ADMIN — FOLIC ACID SCH MG: 1 TABLET ORAL at 09:03

## 2019-04-02 RX ADMIN — PROPRANOLOL HYDROCHLORIDE SCH MG: 20 TABLET ORAL at 09:04

## 2019-04-02 RX ADMIN — QUETIAPINE FUMARATE SCH MG: 25 TABLET, FILM COATED ORAL at 09:05

## 2019-04-02 RX ADMIN — NICOTINE SCH EACH: 21 PATCH, EXTENDED RELEASE TOPICAL at 09:04

## 2019-04-02 RX ADMIN — Medication SCH MG: at 09:05

## 2019-04-02 NOTE — PDOC PROGRESS REPORT
Subjective


Progress Note for:: 04/02/19


Subjective:: 





Still having some intermittent hallucinations.  Needs constant redirection by 

staff.  Is able to ambulate independently without an assistive device or person 

to assist her.


Reason For Visit: 


PANCREATITIS, ETOH INTOXICATION








Physical Exam


Vital Signs: 


                                        











Temp Pulse Resp BP Pulse Ox


 


 98 F   73   18   99/63 L  100 


 


 04/02/19 11:27  04/02/19 11:27  04/02/19 11:27  04/02/19 11:27  04/02/19 11:27








                                 Intake & Output











 04/01/19 04/02/19 04/03/19





 06:59 06:59 06:59


 


Intake Total 1046 1237 


 


Balance 1046 1237 


 


Weight 63.3 kg 61.8 kg 











General appearance: PRESENT: no acute distress, cooperative, disheveled


Respiratory exam: PRESENT: clear to auscultation hill, symmetrical, unlabored.  

ABSENT: accessory muscle use, crackles, prolonged expiratory phas, rhonchi, 

tachypnea, wheezes


Cardiovascular exam: PRESENT: RRR, +S1, +S2


Pulses: PRESENT: normal carotid pulses


Vascular exam: PRESENT: normal capillary refill


GI/Abdominal exam: PRESENT: normal bowel sounds, soft.  ABSENT: distended, 

guarding, rebound, tenderness


Extremities exam: ABSENT: clubbing, pedal edema


Musculoskeletal exam: PRESENT: ambulatory, normal inspection.  ABSENT: deformity


Neurological exam: PRESENT: alert, awake, oriented to person, oriented to place


Psychiatric exam: PRESENT: unusual affect


Focused psych exam: PRESENT: delusional, restlessness


Skin exam: PRESENT: dry, warm





Results


Laboratory Results: 


                                        





                                 03/28/19 05:33 





                                 03/28/19 05:33 





                                        











  03/08/19





  11:47


 


Troponin I  < 0.012











Impressions: 


                                        





Abdomen/Pelvis CT  03/08/19 11:42


IMPRESSION:  Fatty liver


Gallstones


Peripancreatic retroperitoneal inflammation, question pancreatitis


 








Cervical Spine CT  03/08/19 11:42


IMPRESSION:  CHRONIC DEGENERATIVE CHANGES.  NO ACUTE FINDINGS. Similar left 

thyroid nodule with calcifications.


 








Chest CT  03/08/19 11:42


IMPRESSION:  No acute changes


 








Head CT  03/08/19 11:42


IMPRESSION:  No acute intracranial findings.


EVIDENCE OF ACUTE STROKE: NO.


 














Assessment and Plan





- Diagnosis


(1) Alcohol withdrawal


Qualifiers: 


   Complication of substance-induced condition: with delirium   Qualified 

Code(s): F10.231 - Alcohol dependence with withdrawal delirium   


Is this a current diagnosis for this admission?: Yes   


Plan: 


Resolved








(2) Psychosis


Qualifiers: 


   Psychosis type: unspecified psychosis type   Qualified Code(s): F29 - Uns

pecified psychosis not due to a substance or known physiological condition   


Is this a current diagnosis for this admission?: Yes   


Plan: 


I have reconsulted psychiatry for their recommendations regarding the patient's 

medication regimen.  Once those recommendations are made, will implement them.  

We will get their opinion regarding whether or not the patient still needs 

psychiatric placement.  At this time the patient is able to perform her ADLs 

independently.








- Time


Time Spent with patient: 25-34 minutes

## 2019-04-03 RX ADMIN — HALOPERIDOL SCH MG: 5 TABLET ORAL at 21:11

## 2019-04-03 RX ADMIN — HEPARIN SODIUM SCH: 5000 INJECTION, SOLUTION INTRAVENOUS; SUBCUTANEOUS at 05:09

## 2019-04-03 RX ADMIN — PROPRANOLOL HYDROCHLORIDE SCH: 20 TABLET ORAL at 11:20

## 2019-04-03 RX ADMIN — FOLIC ACID SCH MG: 1 TABLET ORAL at 11:10

## 2019-04-03 RX ADMIN — Medication SCH: at 05:10

## 2019-04-03 RX ADMIN — AMLODIPINE BESYLATE SCH: 10 TABLET ORAL at 11:18

## 2019-04-03 RX ADMIN — BUSPIRONE HYDROCHLORIDE SCH MG: 10 TABLET ORAL at 17:34

## 2019-04-03 RX ADMIN — POLYETHYLENE GLYCOL 3350 SCH: 17 POWDER, FOR SOLUTION ORAL at 11:11

## 2019-04-03 RX ADMIN — PROPRANOLOL HYDROCHLORIDE SCH MG: 20 TABLET ORAL at 21:16

## 2019-04-03 RX ADMIN — HALOPERIDOL SCH MG: 5 TABLET ORAL at 11:09

## 2019-04-03 RX ADMIN — NICOTINE SCH EACH: 21 PATCH, EXTENDED RELEASE TOPICAL at 11:10

## 2019-04-03 RX ADMIN — Medication SCH MG: at 11:10

## 2019-04-03 RX ADMIN — GABAPENTIN SCH: 400 CAPSULE ORAL at 06:10

## 2019-04-03 RX ADMIN — BUSPIRONE HYDROCHLORIDE SCH: 10 TABLET ORAL at 11:19

## 2019-04-03 RX ADMIN — ACETAMINOPHEN PRN MG: 325 TABLET ORAL at 17:36

## 2019-04-03 RX ADMIN — HEPARIN SODIUM SCH: 5000 INJECTION, SOLUTION INTRAVENOUS; SUBCUTANEOUS at 14:19

## 2019-04-03 RX ADMIN — HEPARIN SODIUM SCH: 5000 INJECTION, SOLUTION INTRAVENOUS; SUBCUTANEOUS at 21:13

## 2019-04-03 RX ADMIN — BENZTROPINE MESYLATE SCH MG: 1 TABLET ORAL at 11:10

## 2019-04-03 RX ADMIN — QUETIAPINE FUMARATE SCH: 25 TABLET, FILM COATED ORAL at 11:20

## 2019-04-03 RX ADMIN — Medication SCH: at 21:17

## 2019-04-03 RX ADMIN — HYDROXYZINE PAMOATE SCH: 25 CAPSULE ORAL at 11:20

## 2019-04-03 RX ADMIN — Medication SCH: at 14:22

## 2019-04-03 RX ADMIN — GABAPENTIN SCH MG: 400 CAPSULE ORAL at 21:17

## 2019-04-03 RX ADMIN — GABAPENTIN SCH MG: 400 CAPSULE ORAL at 14:22

## 2019-04-03 NOTE — PDOC PROGRESS REPORT
Subjective


Progress Note for:: 04/03/19


Subjective:: 





Still having some intermittent hallucinations.  Needs constant redirection by 

staff.  Is able to ambulate independently without an assistive device or person 

to assist her.  Her affect varies substantially from one moment to the next.


Reason For Visit: 


PANCREATITIS, ETOH INTOXICATION








Physical Exam


Vital Signs: 


                                        











Temp Pulse Resp BP Pulse Ox


 


 98.0 F   85   17   102/58 L  96 


 


 04/03/19 11:15  04/03/19 11:15  04/03/19 11:15  04/03/19 11:15  04/03/19 11:15








                                 Intake & Output











 04/02/19 04/03/19 04/04/19





 06:59 06:59 06:59


 


Intake Total 1237 1454 


 


Balance 1237 1454 


 


Weight 61.8 kg 63.5 kg 








General appearance: PRESENT: no acute distress, cooperative, disheveled


Respiratory exam: PRESENT: clear to auscultation hill, symmetrical, unlabored.  

ABSENT: accessory muscle use, crackles, prolonged expiratory phas, rhonchi, 

tachypnea, wheezes


Cardiovascular exam: PRESENT: RRR, +S1, +S2


Pulses: PRESENT: normal carotid pulses


Vascular exam: PRESENT: normal capillary refill


GI/Abdominal exam: PRESENT: normal bowel sounds, soft.  ABSENT: distended, 

guarding, rebound, tenderness


Extremities exam: ABSENT: clubbing, pedal edema


Musculoskeletal exam: PRESENT: ambulatory, normal inspection.  ABSENT: deformity


Neurological exam: PRESENT: alert, awake, oriented to person, oriented to place


Psychiatric exam: PRESENT: unusual affect


Focused psych exam: PRESENT: delusional, restlessness


Skin exam: PRESENT: dry, warm





Results


Laboratory Results: 


                                        





                                 03/28/19 05:33 





                                 03/28/19 05:33 





                                        











  03/08/19





  11:47


 


Troponin I  < 0.012











Impressions: 


                                        





Abdomen/Pelvis CT  03/08/19 11:42


IMPRESSION:  Fatty liver


Gallstones


Peripancreatic retroperitoneal inflammation, question pancreatitis


 








Cervical Spine CT  03/08/19 11:42


IMPRESSION:  CHRONIC DEGENERATIVE CHANGES.  NO ACUTE FINDINGS. Similar left 

thyroid nodule with calcifications.


 








Chest CT  03/08/19 11:42


IMPRESSION:  No acute changes


 








Head CT  03/08/19 11:42


IMPRESSION:  No acute intracranial findings.


EVIDENCE OF ACUTE STROKE: NO.


 














Assessment and Plan





- Diagnosis


(1) Alcohol withdrawal


Qualifiers: 


   Complication of substance-induced condition: with delirium   Qualified Code(

s): F10.231 - Alcohol dependence with withdrawal delirium   


Is this a current diagnosis for this admission?: Yes   


Plan: 


Resolved








(2) Psychosis


Qualifiers: 


   Psychosis type: unspecified psychosis type   Qualified Code(s): F29 - 

Unspecified psychosis not due to a substance or known physiological condition   


Is this a current diagnosis for this admission?: Yes   


Plan: 


I have reconsulted psychiatry for their recommendations regarding the patient's 

medication regimen.  Those recommendations are made, will implement them today. 

At this time the patient is able to perform her ADLs independently.








- Time


Time Spent with patient: 25-34 minutes

## 2019-04-04 RX ADMIN — ACETAMINOPHEN PRN MG: 325 TABLET ORAL at 21:07

## 2019-04-04 RX ADMIN — PROPRANOLOL HYDROCHLORIDE SCH MG: 20 TABLET ORAL at 10:57

## 2019-04-04 RX ADMIN — Medication SCH: at 05:02

## 2019-04-04 RX ADMIN — FOLIC ACID SCH MG: 1 TABLET ORAL at 10:57

## 2019-04-04 RX ADMIN — HALOPERIDOL SCH MG: 5 TABLET ORAL at 10:58

## 2019-04-04 RX ADMIN — GABAPENTIN SCH MG: 400 CAPSULE ORAL at 05:03

## 2019-04-04 RX ADMIN — Medication SCH MG: at 10:57

## 2019-04-04 RX ADMIN — GABAPENTIN SCH MG: 400 CAPSULE ORAL at 14:56

## 2019-04-04 RX ADMIN — POLYETHYLENE GLYCOL 3350 SCH: 17 POWDER, FOR SOLUTION ORAL at 10:58

## 2019-04-04 RX ADMIN — Medication SCH: at 14:48

## 2019-04-04 RX ADMIN — BUSPIRONE HYDROCHLORIDE SCH MG: 10 TABLET ORAL at 17:27

## 2019-04-04 RX ADMIN — GABAPENTIN SCH MG: 400 CAPSULE ORAL at 21:08

## 2019-04-04 RX ADMIN — NICOTINE SCH EACH: 21 PATCH, EXTENDED RELEASE TOPICAL at 10:58

## 2019-04-04 RX ADMIN — HEPARIN SODIUM SCH UNIT: 5000 INJECTION, SOLUTION INTRAVENOUS; SUBCUTANEOUS at 21:11

## 2019-04-04 RX ADMIN — BUSPIRONE HYDROCHLORIDE SCH MG: 10 TABLET ORAL at 10:57

## 2019-04-04 RX ADMIN — HALOPERIDOL SCH MG: 5 TABLET ORAL at 21:08

## 2019-04-04 RX ADMIN — Medication SCH: at 21:09

## 2019-04-04 RX ADMIN — ACETAMINOPHEN PRN MG: 325 TABLET ORAL at 14:56

## 2019-04-04 RX ADMIN — HEPARIN SODIUM SCH UNIT: 5000 INJECTION, SOLUTION INTRAVENOUS; SUBCUTANEOUS at 05:02

## 2019-04-04 RX ADMIN — ROPINIROLE HYDROCHLORIDE SCH MG: 0.25 TABLET, FILM COATED ORAL at 16:53

## 2019-04-04 RX ADMIN — BENZTROPINE MESYLATE SCH MG: 1 TABLET ORAL at 10:57

## 2019-04-04 RX ADMIN — AMLODIPINE BESYLATE SCH MG: 10 TABLET ORAL at 10:58

## 2019-04-04 RX ADMIN — PROPRANOLOL HYDROCHLORIDE SCH: 20 TABLET ORAL at 21:09

## 2019-04-04 RX ADMIN — HEPARIN SODIUM SCH: 5000 INJECTION, SOLUTION INTRAVENOUS; SUBCUTANEOUS at 14:49

## 2019-04-04 NOTE — PSYCHOLOGICAL NOTE
Psych Note





- Psych Note


Date seen by psych provider: 04/04/19


Time seen by psych provider: 13:00


Psych Note: 





Reason for Consult: requested to re-visit with patient care





Check in conducted with patient:


Clinician notes patient is very cheerful today.  She continues to demonstrate 

tangential thought processes.  She discloses she has seen "wisps of darkness 

that cross her eyes...a rat was in the bathroom."  She then proceeds to state 

that "with rats you get tomcats."  Patient recognizes the tree of life pendent 

clinician is wearing she then stated "when did you go?" When clinician is asked 

for clarification and the patient then asked when clinician went to [facility]. 





3/8/2019 head CT conducted; no finding





Diagnoses:


303.1 (F33.20) Alcohol Use Disorder, Moderate


291.89 (F10.24) Alcohol Induced Depressive Disorder, Severe


R/O Neurodegenerative issues as a result of long term alcohol use





Medication recommendations made by the psychiatric medical provider, Dr. Sebastien MD., includes:


Discontinue Seroquel 


Discontinue Cymbalta 


Discontinue Ativan 


Discontinue Valium


Discontinue Requip


Discontinue Zyprexa


Discontinue Visteral


Continue Haldol 5mg twice daily


Discontinue haldol 1mg every 4 hours as needed


Increase Thorazine 50 mg every 8 hours as needed


Decrease Buspar to 5mg twice daily


Continue Cogentin 1 mg daily


Add Clonidine 0.2 mg 12-hour patch





Impression/Plan: Patient continues to be denies by psychiatric 

hospitals;however, currently is still wait listed by [facility] 

psychiatric placement. The Behavioral health team receives a call of 

confirmation daily checking to see if patient still needs placement.  Patient 

currently does have a primary diagnosis of alcohol use disorder with a secondary

of depressive disorder. At this time, there is significant concern the patient 

is having difficulties with abstract and rational thinking in addition to 

continued demonstrations of hallucinations that come and go and tangential 

thought processes.  Will continue to follow. Dr. Rose was consulted on the 

care and management of this patient; attending physician is in agreement with 

recommendations and disposition.

## 2019-04-04 NOTE — PSYCHOLOGICAL NOTE
Psych Note





- Psych Note


Date seen by psych provider: 04/03/19


Time seen by psych provider: 08:00


Psych Note: 


Reason for Consult: requested to re-visit with patient care





Patient is very pleasant today and is able to engage in organized linear 

conversation with clinician.  She reports that she knows she is at [facility] in [place] however believes that she is in the county of 

[facility].  Clinician notes patient lives in [facility] [place] it 

would be well aware of the county is [place].  Patient was unable to correctly 

identify the the day and month as she believed it was currently still March 

however this is not surprising as the patient has been in the hospital over 3 

weeks.  She was able to identify the 2019 as the year and her birthday is June 12, 1960.  Patient was able to correctly identify the current president.  She 

was able to identify past presidents as "Dell, Dyllan 2, Dyllan 1;" however, 

it is noted the patient was asked to list the last three presidents before 

President Jax.  Patient is noted to have extreme difficulty with abstract 

rational level thinking and unable to answer any questions correctly.  She was 

able to have immediate recall for memory however was unable to demonstrate any 

longer recall memory.  Patient was able to correctly I demonstrate executive 

functioning, sequencing and switching questions.  In addition patient was able 

to demonstrate problem solving and safety questions correctly.  Patient reports 

she is able to conduct her ADLs on her own and has been observed by hospital 

staff eating, grooming, dressing herself and walking unassisted.





Clinician spoke with attending nurses.  They disclosed that the patient was 

demonstrating hallucinations approximately 10 minutes prior to clinician's 

arrival; reporting the patient was attempting to michelle a cat out of her room.





3/8/2019 head CT conducted; no finding





Diagnoses:


303.1 (F33.20) Alcohol Use Disorder, Moderate


291.89 (F10.24) Alcohol Induced Depressive Disorder, Severe


R/O Neurodegenerative issues as a result of long term alcohol use





Medication recommendations made by the psychiatric medical provider, Dr. Sebastien MD., includes:


Discontinue Seroquel 


Discontinue Cymbalta 


Discontinue Ativan 


Discontinue Valium


Discontinue Requip


Discontinue Zyprexa


Discontinue Visteral


Continue Haldol 5mg twice daily


Discontinue haldol 1mg every 4 hours as needed


Increase Thorazine 50 mg every 8 hours as needed


Decrease Buspar to 5mg twice daily


Continue Cogentin 1 mg daily


Add Clonidine 0.2 mg 12-hour patch





Impression/Plan: Patient continues to be denies by psychiatric hosp

itals;however, currently is still wait listed by [facility]

[facility].  Patient currently does have a primary diagnosis of alcohol use 

disorder with a secondary of depressive disorder. Attending physician has 

implemented medication recommendations. At this time, there is significant 

concern the patient is having difficulties with abstract and rational thinking 

in addition to continued demonstrations of hallucinations that come and go.  

Will continue to follow. Dr. Rose was consulted on the care and management of

this patient; attending physician is in agreement with recommendations and 

disposition.

## 2019-04-04 NOTE — PDOC PROGRESS REPORT
Subjective


Progress Note for:: 04/04/19


Subjective:: 





Still having some intermittent hallucinations.  Needs constant redirection by 

staff.  Is able to ambulate independently without an assistive device or person 

to assist her.  Her affect varies substantially from one moment to the next.


Reason For Visit: 


PANCREATITIS, ETOH INTOXICATION








Physical Exam


Vital Signs: 


                                        











Temp Pulse Resp BP Pulse Ox


 


 98.5 F   79   16   107/71   100 


 


 04/04/19 15:18  04/04/19 15:18  04/04/19 15:18  04/04/19 15:18  04/04/19 15:18








                                 Intake & Output











 04/03/19 04/04/19 04/05/19





 06:59 06:59 06:59


 


Intake Total 1454 1294 537


 


Balance 1454 1294 537


 


Weight 63.5 kg 66.6 kg 








General appearance: PRESENT: no acute distress, cooperative, disheveled


Respiratory exam: PRESENT: clear to auscultation hill, symmetrical, unlabored.  

ABSENT: accessory muscle use, crackles, prolonged expiratory phas, rhonchi, 

tachypnea, wheezes


Cardiovascular exam: PRESENT: RRR, +S1, +S2


Pulses: PRESENT: normal carotid pulses


Vascular exam: PRESENT: normal capillary refill


GI/Abdominal exam: PRESENT: normal bowel sounds, soft.  ABSENT: distended, 

guarding, rebound, tenderness


Extremities exam: ABSENT: clubbing, pedal edema


Musculoskeletal exam: PRESENT: ambulatory, normal inspection.  ABSENT: deformity


Neurological exam: PRESENT: alert, awake, oriented to person, oriented to place


Psychiatric exam: PRESENT: unusual affect


Focused psych exam: PRESENT: delusional, restlessness


Skin exam: PRESENT: dry, warm





Results


Laboratory Results: 


                                        





                                 03/28/19 05:33 





                                 03/28/19 05:33 





                                        











  03/08/19





  11:47


 


Troponin I  < 0.012











Impressions: 


                                        





Abdomen/Pelvis CT  03/08/19 11:42


IMPRESSION:  Fatty liver


Gallstones


Peripancreatic retroperitoneal inflammation, question pancreatitis


 








Cervical Spine CT  03/08/19 11:42


IMPRESSION:  CHRONIC DEGENERATIVE CHANGES.  NO ACUTE FINDINGS. Similar left 

thyroid nodule with calcifications.


 








Chest CT  03/08/19 11:42


IMPRESSION:  No acute changes


 








Head CT  03/08/19 11:42


IMPRESSION:  No acute intracranial findings.


EVIDENCE OF ACUTE STROKE: NO.


 














Assessment and Plan





- Diagnosis


(1) Alcohol withdrawal


Qualifiers: 


   Complication of substance-induced condition: with delirium   Qualified 

Code(s): F10.231 - Alcohol dependence with withdrawal delirium   


Is this a current diagnosis for this admission?: Yes   


Plan: 


Resolved








(2) Psychosis


Qualifiers: 


   Psychosis type: unspecified psychosis type   Qualified Code(s): F29 - 

Unspecified psychosis not due to a substance or known physiological condition   


Is this a current diagnosis for this admission?: Yes   


Plan: 


I have reconsulted psychiatry for their recommendations.  Medication 

recommendations have been implemented.  Psychiatry is still working on placement

for this patient.  At this time the patient is able to perform her ADLs 

independently.








- Time


Time Spent with patient: 15-24 minutes

## 2019-04-05 RX ADMIN — PROPRANOLOL HYDROCHLORIDE SCH MG: 20 TABLET ORAL at 09:24

## 2019-04-05 RX ADMIN — AMLODIPINE BESYLATE SCH MG: 10 TABLET ORAL at 09:24

## 2019-04-05 RX ADMIN — ACETAMINOPHEN PRN MG: 325 TABLET ORAL at 06:54

## 2019-04-05 RX ADMIN — BUSPIRONE HYDROCHLORIDE SCH MG: 10 TABLET ORAL at 18:12

## 2019-04-05 RX ADMIN — GABAPENTIN SCH MG: 400 CAPSULE ORAL at 06:36

## 2019-04-05 RX ADMIN — HEPARIN SODIUM SCH: 5000 INJECTION, SOLUTION INTRAVENOUS; SUBCUTANEOUS at 15:11

## 2019-04-05 RX ADMIN — ACETAMINOPHEN PRN MG: 325 TABLET ORAL at 21:29

## 2019-04-05 RX ADMIN — HALOPERIDOL SCH MG: 5 TABLET ORAL at 21:12

## 2019-04-05 RX ADMIN — BUSPIRONE HYDROCHLORIDE SCH MG: 10 TABLET ORAL at 09:24

## 2019-04-05 RX ADMIN — PROPRANOLOL HYDROCHLORIDE SCH MG: 20 TABLET ORAL at 21:12

## 2019-04-05 RX ADMIN — CHLORPROMAZINE HYDROCHLORIDE PRN MG: 25 INJECTION INTRAMUSCULAR at 22:40

## 2019-04-05 RX ADMIN — GABAPENTIN SCH MG: 400 CAPSULE ORAL at 15:11

## 2019-04-05 RX ADMIN — Medication SCH: at 15:12

## 2019-04-05 RX ADMIN — Medication SCH: at 05:19

## 2019-04-05 RX ADMIN — NICOTINE SCH EACH: 21 PATCH, EXTENDED RELEASE TOPICAL at 09:24

## 2019-04-05 RX ADMIN — BENZTROPINE MESYLATE SCH MG: 1 TABLET ORAL at 09:24

## 2019-04-05 RX ADMIN — FOLIC ACID SCH MG: 1 TABLET ORAL at 09:24

## 2019-04-05 RX ADMIN — GABAPENTIN SCH MG: 400 CAPSULE ORAL at 21:12

## 2019-04-05 RX ADMIN — ROPINIROLE HYDROCHLORIDE SCH MG: 0.25 TABLET, FILM COATED ORAL at 21:12

## 2019-04-05 RX ADMIN — HALOPERIDOL SCH MG: 5 TABLET ORAL at 09:24

## 2019-04-05 RX ADMIN — Medication SCH MG: at 09:30

## 2019-04-05 RX ADMIN — HEPARIN SODIUM SCH UNIT: 5000 INJECTION, SOLUTION INTRAVENOUS; SUBCUTANEOUS at 06:36

## 2019-04-05 RX ADMIN — HEPARIN SODIUM SCH UNIT: 5000 INJECTION, SOLUTION INTRAVENOUS; SUBCUTANEOUS at 21:11

## 2019-04-05 RX ADMIN — Medication SCH: at 21:13

## 2019-04-05 RX ADMIN — POLYETHYLENE GLYCOL 3350 SCH: 17 POWDER, FOR SOLUTION ORAL at 09:29

## 2019-04-05 NOTE — PDOC PROGRESS REPORT
Subjective


Progress Note for:: 04/05/19


Subjective:: 





Still having some intermittent hallucinations.  Needs constant redirection by 

staff.  Is able to ambulate independently without an assistive device or person 

to assist her.  Her affect varies substantially from one moment to the next.  

She has a very odd thoughts and often makes odd nonsensical statements.  She has

not demonstrated any hostile or aggressive behavior.


Reason For Visit: 


PANCREATITIS, ETOH INTOXICATION








Physical Exam


Vital Signs: 


                                        











Temp Pulse Resp BP Pulse Ox


 


 97.9 F   73   16   114/67   95 


 


 04/05/19 11:41  04/05/19 11:41  04/05/19 11:41  04/05/19 11:41  04/05/19 11:41








                                 Intake & Output











 04/04/19 04/05/19 04/06/19





 06:59 06:59 06:59


 


Intake Total 1294 774 537


 


Output Total  0 


 


Balance 1294 774 537


 


Weight 66.6 kg 68.8 kg 








General appearance: PRESENT: no acute distress, cooperative, disheveled


Respiratory exam: PRESENT: clear to auscultation hill, symmetrical, unlabored.  

ABSENT: accessory muscle use, crackles, prolonged expiratory phas, rhonchi, 

tachypnea, wheezes


Cardiovascular exam: PRESENT: RRR, +S1, +S2


Pulses: PRESENT: normal carotid pulses


Vascular exam: PRESENT: normal capillary refill


GI/Abdominal exam: PRESENT: normal bowel sounds, soft.  ABSENT: distended, 

guarding, rebound, tenderness


Extremities exam: ABSENT: clubbing, pedal edema


Musculoskeletal exam: PRESENT: ambulatory, normal inspection.  ABSENT: deformity


Neurological exam: PRESENT: alert, awake, oriented to person, oriented to place


Psychiatric exam: PRESENT: unusual affect


Focused psych exam: PRESENT: delusional, restlessness


Skin exam: PRESENT: dry, warm





Results


Laboratory Results: 


                                        





                                 03/28/19 05:33 





                                 03/28/19 05:33 





                                        











  03/08/19





  11:47


 


Troponin I  < 0.012











Impressions: 


                                        





Abdomen/Pelvis CT  03/08/19 11:42


IMPRESSION:  Fatty liver


Gallstones


Peripancreatic retroperitoneal inflammation, question pancreatitis


 








Cervical Spine CT  03/08/19 11:42


IMPRESSION:  CHRONIC DEGENERATIVE CHANGES.  NO ACUTE FINDINGS. Similar left 

thyroid nodule with calcifications.


 








Chest CT  03/08/19 11:42


IMPRESSION:  No acute changes


 








Head CT  03/08/19 11:42


IMPRESSION:  No acute intracranial findings.


EVIDENCE OF ACUTE STROKE: NO.


 














Assessment and Plan





- Diagnosis


(1) Alcohol withdrawal


Qualifiers: 


   Complication of substance-induced condition: with delirium   Qualified 

Code(s): F10.231 - Alcohol dependence with withdrawal delirium   


Is this a current diagnosis for this admission?: Yes   


Plan: 


Resolved








(2) Psychosis


Qualifiers: 


   Psychosis type: unspecified psychosis type   Qualified Code(s): F29 - 

Unspecified psychosis not due to a substance or known physiological condition   


Is this a current diagnosis for this admission?: Yes   


Plan: 


I have reconsulted psychiatry for their recommendations.  Medication 

recommendations have been implemented.  Psychiatry is still working on placement

for this patient.  At this time the patient is able to perform her ADLs 

independently.








- Time


Time Spent with patient: 15-24 minutes

## 2019-04-06 RX ADMIN — HEPARIN SODIUM SCH: 5000 INJECTION, SOLUTION INTRAVENOUS; SUBCUTANEOUS at 22:30

## 2019-04-06 RX ADMIN — Medication SCH MG: at 12:15

## 2019-04-06 RX ADMIN — ROPINIROLE HYDROCHLORIDE SCH MG: 0.25 TABLET, FILM COATED ORAL at 22:32

## 2019-04-06 RX ADMIN — PROPRANOLOL HYDROCHLORIDE SCH MG: 20 TABLET ORAL at 12:03

## 2019-04-06 RX ADMIN — PROPRANOLOL HYDROCHLORIDE SCH MG: 20 TABLET ORAL at 22:32

## 2019-04-06 RX ADMIN — HALOPERIDOL SCH MG: 5 TABLET ORAL at 22:32

## 2019-04-06 RX ADMIN — POLYETHYLENE GLYCOL 3350 SCH: 17 POWDER, FOR SOLUTION ORAL at 09:15

## 2019-04-06 RX ADMIN — GABAPENTIN SCH MG: 400 CAPSULE ORAL at 14:25

## 2019-04-06 RX ADMIN — Medication SCH MG: at 17:08

## 2019-04-06 RX ADMIN — GABAPENTIN SCH MG: 400 CAPSULE ORAL at 22:33

## 2019-04-06 RX ADMIN — BENZTROPINE MESYLATE SCH MG: 1 TABLET ORAL at 09:14

## 2019-04-06 RX ADMIN — HALOPERIDOL SCH MG: 5 TABLET ORAL at 09:14

## 2019-04-06 RX ADMIN — AMLODIPINE BESYLATE SCH: 10 TABLET ORAL at 12:05

## 2019-04-06 RX ADMIN — Medication SCH: at 22:33

## 2019-04-06 RX ADMIN — HEPARIN SODIUM SCH: 5000 INJECTION, SOLUTION INTRAVENOUS; SUBCUTANEOUS at 14:25

## 2019-04-06 RX ADMIN — HEPARIN SODIUM SCH: 5000 INJECTION, SOLUTION INTRAVENOUS; SUBCUTANEOUS at 05:21

## 2019-04-06 RX ADMIN — BUSPIRONE HYDROCHLORIDE SCH MG: 10 TABLET ORAL at 09:14

## 2019-04-06 RX ADMIN — BUSPIRONE HYDROCHLORIDE SCH MG: 10 TABLET ORAL at 17:08

## 2019-04-06 RX ADMIN — GABAPENTIN SCH MG: 400 CAPSULE ORAL at 06:44

## 2019-04-06 RX ADMIN — Medication SCH: at 14:25

## 2019-04-06 RX ADMIN — NICOTINE SCH EACH: 21 PATCH, EXTENDED RELEASE TOPICAL at 09:13

## 2019-04-06 RX ADMIN — Medication SCH: at 05:21

## 2019-04-06 RX ADMIN — FOLIC ACID SCH MG: 1 TABLET ORAL at 12:03

## 2019-04-06 NOTE — PROGRESS NOTE E
Progress Note



NAME: BRENNON PECK

MRN: [id number]

: 1960             AGE: 58Y

DATE:  2019                    ROOM: 329



SUBJECTIVE:

The patient was sleeping in bed when I initially rounded.  I came back

later, and the patient was awake.  She is alert to self.  Still has some

bizarre delusions.  The patient has been afebrile.  Blood pressure has

been in good range, and the patient does not voice any other concerns at

this time.



REVIEW OF SYSTEMS:

Rest of the review of systems is negative.



MEDICATION:

Medication has been reviewed.



OBJECTIVE:

GENERAL:  The patient is a 58-year-old  female who is awake,

alert, and oriented.  Does not appear to be distressed.



VITAL SIGNS:  Temperature 97.6, pulse 82, respirations 14, blood pressure

94/54, oxygen saturation 99% on room air.



SKIN:  Warm and dry.  No rashes.  Not diaphoretic.



HEENT:  Pupils are reactive.



CVS:  Heart is regular.



CHEST:  Clear, symmetrical.



EXTREMITIES:  There is no edema.



PSYCHIATRIC:  The patient does have an odd affect.



DIAGNOSTICS/LAB VALUES:

Hematology obtained on 2019:  WBC 7.3, hemoglobin 14.8, hematocrit

43.3, platelet count 300,000.



Chemistry obtained on 2019:  Sodium 137, potassium 4.0, chloride 97,

carbon dioxide 28, BUN 17, creatinine 0.78, glucose 148, calcium 10.1,

bilirubin 0.58, AST 33, ALT 60, alkaline phosphatase 131.  Total protein

8.3.  Albumin 4.1.



ASSESSMENT AND PLAN:

1.   ALCOHOL WITHDRAWAL WITH DELIRIUM.  The patient has completed her course of

     DT.  Will continue supportive therapy including thiamine. 

     Electrolyte replacement.

2.   PSYCHOSIS.  Unspecified.  Do appreciate Psychiatry input with this.  The

     patient is able to perform activities of daily living, is currently

     awaiting placement.

3.   HYPERTENSION.  Blood pressures are in an acceptable range.



DISPOSITION:

The patient is a full code.  Depending on the patient's symptomatology and

diagnostic findings, will reevaluate in the a.m.



Time spent on this followup, including assessment, plan, physical

examination, patient education, and review of records is 25 minutes.





DICTATING PHYSICIAN:  LISSA VALENTINO NP





[id number]                  DT: 2019    1236

PHY#: [id number]            DD: 2019    1131

ID:   [id number]           JOB#: [id number]       ACCT: [id number]



cc:

>

## 2019-04-06 NOTE — PSYCHOLOGICAL NOTE
Psych Note





- Psych Note


Date seen by psych provider: 04/06/19


Time seen by psych provider: 13:00


Psych Note: 


Reason for consult: Check in on medication benefit


Contact Permissions:





Patient is sleeping upon arrival gathered collateral information from medical 

staff





Diagnosis:


303.1 (F33.20) Alcohol Use Disorder, Moderate


291.89 (F10.24) Alcohol Induced Depressive Disorder, Severe


R/O Neurodegenerative issues as a result of long term alcohol use





Medication recommendations as per psychiatric provider, Dr. Crowe are as 

follows:


Discontinue Seroquel 


Discontinue Cymbalta 


Discontinue Ativan 


Discontinue Valium


Discontinue Requip


Discontinue Zyprexa


Discontinue Visteral


Continue Haldol 5mg twice daily


Discontinue haldol 1mg every 4 hours as needed


Increase Thorazine 50 mg every 8 hours as needed


Decrease Buspar to 5mg twice daily


Continue Cogentin 1 mg daily


Add Clonidine 0.2 mg 12-hour patch





Impression/Plan: 


Patient is recommended to maintain IVC for risk of harm to self or others due to

impaired insight, judgment, and impulse control aeb patient is still actively 

experiencing primarily visual but also auditory hallucinations and would elope 

if able.  Patient is a 59 yo female with likely neurodegenerative problems 

related to long-term alcohol use.  Patient continues to be denied for IP 

psychiatric hospitalization.   She is wait listed by [facility] 

psychiatric placement. The Behavioral health team receives a call of 

confirmation daily checking to see if patient still needs placement.  Consulted 

Dr. Rose was consulted on the care and treatment of this patient; attending 

physician is in agreement with recommendations and disposition.

## 2019-04-07 LAB
ANION GAP SERPL CALC-SCNC: 10 MMOL/L (ref 5–19)
BUN SERPL-MCNC: 11 MG/DL (ref 7–20)
CALCIUM: 10.1 MG/DL (ref 8.4–10.2)
CHLORIDE SERPL-SCNC: 100 MMOL/L (ref 98–107)
CO2 SERPL-SCNC: 26 MMOL/L (ref 22–30)
ERYTHROCYTE [DISTWIDTH] IN BLOOD BY AUTOMATED COUNT: 14.1 % (ref 11.5–14)
GLUCOSE SERPL-MCNC: 130 MG/DL (ref 75–110)
HCT VFR BLD CALC: 39.8 % (ref 36–47)
HGB BLD-MCNC: 13.6 G/DL (ref 12–15.5)
MCH RBC QN AUTO: 30.7 PG (ref 27–33.4)
MCHC RBC AUTO-ENTMCNC: 34 G/DL (ref 32–36)
MCV RBC AUTO: 90 FL (ref 80–97)
PLATELET # BLD: 230 10^3/UL (ref 150–450)
POTASSIUM SERPL-SCNC: 4.5 MMOL/L (ref 3.6–5)
RBC # BLD AUTO: 4.41 10^6/UL (ref 3.72–5.28)
SODIUM SERPL-SCNC: 136.3 MMOL/L (ref 137–145)
WBC # BLD AUTO: 8 10^3/UL (ref 4–10.5)

## 2019-04-07 RX ADMIN — Medication SCH: at 05:07

## 2019-04-07 RX ADMIN — FOLIC ACID SCH MG: 1 TABLET ORAL at 11:45

## 2019-04-07 RX ADMIN — Medication SCH MG: at 05:14

## 2019-04-07 RX ADMIN — NICOTINE SCH EACH: 21 PATCH, EXTENDED RELEASE TOPICAL at 11:46

## 2019-04-07 RX ADMIN — ROPINIROLE HYDROCHLORIDE SCH MG: 0.25 TABLET, FILM COATED ORAL at 22:10

## 2019-04-07 RX ADMIN — GABAPENTIN SCH MG: 400 CAPSULE ORAL at 22:05

## 2019-04-07 RX ADMIN — HEPARIN SODIUM SCH UNIT: 5000 INJECTION, SOLUTION INTRAVENOUS; SUBCUTANEOUS at 14:50

## 2019-04-07 RX ADMIN — BENZTROPINE MESYLATE SCH MG: 1 TABLET ORAL at 11:45

## 2019-04-07 RX ADMIN — CHLORPROMAZINE HYDROCHLORIDE PRN MG: 25 INJECTION INTRAMUSCULAR at 05:59

## 2019-04-07 RX ADMIN — GABAPENTIN SCH MG: 400 CAPSULE ORAL at 05:14

## 2019-04-07 RX ADMIN — Medication SCH MG: at 11:47

## 2019-04-07 RX ADMIN — HEPARIN SODIUM SCH: 5000 INJECTION, SOLUTION INTRAVENOUS; SUBCUTANEOUS at 05:08

## 2019-04-07 RX ADMIN — HALOPERIDOL SCH MG: 5 TABLET ORAL at 11:45

## 2019-04-07 RX ADMIN — POLYETHYLENE GLYCOL 3350 SCH: 17 POWDER, FOR SOLUTION ORAL at 11:49

## 2019-04-07 RX ADMIN — PROPRANOLOL HYDROCHLORIDE SCH MG: 20 TABLET ORAL at 14:50

## 2019-04-07 RX ADMIN — Medication SCH MG: at 22:04

## 2019-04-07 RX ADMIN — GABAPENTIN SCH MG: 400 CAPSULE ORAL at 14:50

## 2019-04-07 RX ADMIN — PROPRANOLOL HYDROCHLORIDE SCH MG: 20 TABLET ORAL at 22:09

## 2019-04-07 RX ADMIN — BUSPIRONE HYDROCHLORIDE SCH MG: 10 TABLET ORAL at 11:49

## 2019-04-07 RX ADMIN — HALOPERIDOL SCH MG: 5 TABLET ORAL at 22:09

## 2019-04-07 RX ADMIN — BUSPIRONE HYDROCHLORIDE SCH MG: 10 TABLET ORAL at 22:05

## 2019-04-07 NOTE — PROGRESS NOTE E
Progress Note



NAME: BRENNON PECK

MRN: [id number]

: 1960             AGE: 58Y

DATE:  2019                    ROOM: 329



SUBJECTIVE:

The patient is lying in bed.  The patient has been given a sedative this

morning and is still sleeping.  There have been no reported episodes of

vomiting nor diarrhea.  The patient appears comfortable and no concerns

are voiced at this time.



REVIEW OF SYSTEMS:

Unobtainable.



MEDICATIONS:

Reviewed.

 

OBJECTIVE:

GENERAL:  The patient is a 58-year-old  female that is currently

sleeping and does not appear to be distressed.



VITAL SIGNS:  Temperature is 97.5, pulse 96, respirations 20, blood

pressure is 102/67, oxygen saturation 99% on room air.



SKIN:  Warm.  She is not diaphoretic.



HEENT:  There is no obvious JVD.  Mucous membranes appear moist.



CHEST:  Symmetrical, unlabored.



EXTREMITIES:  No edema.



PSYCHIATRIC:  The patient is sleeping.



DIAGNOSTICS:

Lab values are as follows:  Hematology obtained on 2019:  WBCs are

8.0, hemoglobin is 13.6, hematocrit is 39.8, platelet count is 230,000.



Chemistry obtained on 2019:  Sodium is 136, potassium 4.5, chloride

is 100, carbon dioxide 26, BUN 11, creatinine 0.54, glucose 30, calcium is

2.1, magnesium is 1.7.



IMPRESSION AND PLAN:

1.   ALCOHOL WITHDRAWAL WITH DELIRIUM.  The patient completed her course of

     DTs.  Continue supportive therapy, including thiamine and electrolyte

     replacement.

2.   PSYCHOSIS, UNSPECIFIED.  Do appreciate Psychiatry input.  The patient

     remains under IVC and is waiting for placement.

3.   HYPERTENSION.  Blood pressure has been in acceptable range.



DISPOSITION:

The patient is a FULL CODE.  Pending patient's symptomatology and

diagnostic findings, will reevaluate in the a.m.



Time spent on this followup, including assessment, plan and review of

records is 10 minutes.



DICTATING PHYSICIAN:  LISSA VALENTINO NP





[id number]                  DT: 2019    1206

PHY#: [id number]            DD: 2019    0946

ID:   [id number]           JOB#: [id number]       ACCT: [id number]



cc:

>

## 2019-04-08 VITALS — DIASTOLIC BLOOD PRESSURE: 65 MMHG | SYSTOLIC BLOOD PRESSURE: 113 MMHG

## 2019-04-08 RX ADMIN — BENZTROPINE MESYLATE SCH MG: 1 TABLET ORAL at 10:18

## 2019-04-08 RX ADMIN — Medication SCH: at 03:28

## 2019-04-08 RX ADMIN — NICOTINE SCH EACH: 21 PATCH, EXTENDED RELEASE TOPICAL at 10:19

## 2019-04-08 RX ADMIN — PROPRANOLOL HYDROCHLORIDE SCH MG: 20 TABLET ORAL at 10:19

## 2019-04-08 RX ADMIN — GABAPENTIN SCH MG: 400 CAPSULE ORAL at 06:34

## 2019-04-08 RX ADMIN — Medication SCH MG: at 13:07

## 2019-04-08 RX ADMIN — POLYETHYLENE GLYCOL 3350 SCH: 17 POWDER, FOR SOLUTION ORAL at 10:19

## 2019-04-08 RX ADMIN — FOLIC ACID SCH MG: 1 TABLET ORAL at 10:18

## 2019-04-08 RX ADMIN — HALOPERIDOL SCH MG: 5 TABLET ORAL at 10:19

## 2019-04-08 RX ADMIN — Medication SCH MG: at 02:12

## 2019-04-08 RX ADMIN — BUSPIRONE HYDROCHLORIDE SCH MG: 10 TABLET ORAL at 10:18

## 2019-04-08 NOTE — PSYCHOLOGICAL NOTE
Psych Note





- Psych Note


Date seen by psych provider: 04/08/19


Time seen by psych provider: 10:25


Psych Note: 


Reason for Consult: requested to re-visit with patient care





Clinician checked in with attending nurse.  The patient is noted to have no 

documented hallucinations since Friday.





Check in conducted with patient:


Patient is able to correctly identify month a year, location, birth date, 

current president and past presidents.  When asking abstract rational level 

thinking she was able to get 2 out of 3 answers correct.  She was able to 

demonstrate immediate recall for memory; however, did have difficulty for longer

term memory after a minute.  Patient was able to demonstrate executive 

functioning, sequencing and switching by spelling world backwards correctly and 

counting backwards by 7 starting from 49.  It is noted the patient's attention 

is poor as the patient was asked to start from 100 and when started again was 

able to perform the task once identifying 93 however after that she reports "I 

just cannot think anymore."  Patient was able to correctly identify problem 

solving and safety questions going in depth to possible scenarios on why a 

neighbor may need help; "first call 911 then I would go over there because it 

could just be just food smoke."  When discussing medications, she lists previous

medication she has been on prior to this [facility] visit; "I take Cymbalta for pain, 

Nexium for acid reflux but only as needed, gabapentin is for pain, and I take 

Seroquel and Requip both for my restless leg.  I also take Mobic as needed... 

that is...what do they call that?... it's like an anti-arthritic like motrin." 

Patient denies seeing anything that has confused her or seemed out of place; "it

been a while...I think a few days." 





When discussing her alcoholism and her suicidal ideation she reports that she 

understands that alcohol "is killing me."  She states that she wants to go to 

heaven and engaged in an honest conversation on thoughts on if you can go to 

heaven if you kill yourself.  She disclosed she is not sure if you do, even 

though she has always been told you can't; "I don't know... honestly, I can't 

say...Is there anyone that can? ...I want to go to [facility]...That's why I didn'

t...I called to come here." (Patient called EMS herself to come to [facility] for 

assistance for medical and suicidal ideation after engaging in self harm of 

cutting).  She continued to discuss that she believes there are "3 phases of 

life;"  "Your born, you live, and you die."  She reports that she wants to "live

well" for her kids.  She denies current thoughts of wanting to harm herself.  

When clinician discussed her alcoholism and treatment options again, it is 

interesting to note that the patient backtracks and states "now, I don't know if

it is actually killing me...but I know it is interfering with my life."  She 

reports that she would like to stop drinking; however, is adamant that she will 

not go to rehab.  When discussing options such as intensive outpatient substance

use treatment she states she does not have the money for gas.





3/8/2019 head CT conducted; no finding





Diagnoses:


291.0 (F10.231) Alcohol withdrawal delirium; persistent, hyperactive with visual

disturbances


303.90 (F10.20) Alcohol use disorder, severe


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 





Medication recommendations made by the psychiatric medical provider, Dr. Ángel fu MD., includes:


Continue Thorazine 50 mg every 8 hours as needed


Continue Buspar to 5mg twice daily


Continue Cogentin 1 mg daily


Continue Clonidine 0.2 mg 12-hour patch


Continue Haldol 5mg twice daily





Impression/Plan: Patient is recommended for rescind of IVC and is cleared from 

acute psychiatric services.  Patient no longer meets IVC criteria per [place] 

122C.  Patient has not had any documented hallucinations for the last 3 days.  

It is noted the patient does have a poor attention span; however, she is able to

carry on organized and linear conversation and is easily re-directed in 

conversation.  Patient is fully orientated and discussed with clinician both her

thoughts on suicidal ideation and her alcoholism. She denies current suicidal 

ideation.  Patient is demonstrating little interest in true sobriety. Patient 

has documented history refusing substance abuse treatment.  Patient is fully 

orientated and was able to demonstrate higher level cognitive thought processing

abilities.  It is currently believe the patient suffered from alcohol withdrawal

delirium that was persistent, hyperactive with visual disturbances. The patient 

is recommended to follow-up with a neurologist as there is concern for mild 

neurodegenerative disorder due to her chronic alcoholism.  While the patient's 

head CT was negative, the patient has a history of documented high level of 

alcohol intoxication during previous [facility] visits, including this visit where her 

ETOH was 426, and her continued presenting symptoms support a neurodegenerative 

process.  Patient is highly encouraged to follow through with substance abuse 

treatment.  Medication recommendations have been provided.  Dr. Rose was 

consulted and the care management of this patient; attending physicians in 

agreement with recommendations and disposition.

## 2019-04-08 NOTE — PROGRESS NOTE E
Progress Note



NAME: BRENNON PECK

MRN: [id number]

: 1960             AGE: 58Y

DATE:  2019          ROOM: 329



SUBJECTIVE:

The patient is lying in bed.  She is sleeping this morning.  Medication

changes have been addressed. The patient is currently awaiting

placement.  No concerns are voiced at this time.



MEDICATIONS:

Reviewed.



PHYSICAL EXAMINATION:

GENERAL:  The patient is a 58-year-old  female that is sleeping. 

She does not appear to be distressed.



VITAL SIGNS:  As follows:  Temperature is 98.0, pulse 79, respirations 13,

blood pressure 113/65, oxygen saturation is 95% on room air.



SKIN:  Warm and dry.  Not diaphoretic.



HEENT:  Mucous membranes appear moist.  There is no evidence of JVP.



CHEST:  Symmetrical, unlabored.



ABDOMEN:  Nondistended.



EXTREMITIES:  No edema.



DIAGNOSTICS:

Lab values are as follows.  Hematology obtained on 2019:  WBCs are

8.0, hemoglobin is 13.6, hematocrit is 39.8, platelet count is 230,000.



Chemistry obtained on 2019:  Sodium is 136, potassium 4.5, chloride

100, carbon dioxide 26, BUN 11, creatinine 0.54, glucose 130, calcium is

10.1, magnesium is 1.7.



IMPRESSION AND PLAN:

1.  ALCOHOL WITHDRAWAL WITH DELIRIUM TREMENS.  The patient has completed

her course of DT's.  Continue supportive therapy, including high-dose

thiamine and electrolyte replacement.

2.  PSYCHOSIS, UNSPECIFIED.  The patient is currently awaiting placement. 

The patient remains under IVC.

3.  HYPERTENSION.  Blood pressure has been in an acceptable range.



DISPOSITION:

THE PATIENT IS A FULL CODE.  Pending the patient's symptomatology and

diagnostic findings, will re-evaluate in the a.m.



Time spent on this followup, including assessment, is 10 minutes.



DICTATING PHYSICIAN:  LISSA VALENTINO NP





[id number]                  DT: 2019    1027

PHY#: [id number]            DD: 2019    1017

ID:   [id number]           JOB#: [id number]       ACCT: [id number]



cc:

>







MTDD
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
## 2019-05-15 ENCOUNTER — HOSPITAL ENCOUNTER (EMERGENCY)
Dept: HOSPITAL 62 - ER | Age: 59
LOS: 1 days | Discharge: HOME | End: 2019-05-16
Payer: COMMERCIAL

## 2019-05-15 DIAGNOSIS — R56.9: ICD-10-CM

## 2019-05-15 DIAGNOSIS — F17.210: ICD-10-CM

## 2019-05-15 DIAGNOSIS — N39.0: Primary | ICD-10-CM

## 2019-05-15 DIAGNOSIS — I10: ICD-10-CM

## 2019-05-15 LAB
ADD MANUAL DIFF: NO
ALBUMIN SERPL-MCNC: 4.5 G/DL (ref 3.5–5)
ALP SERPL-CCNC: 159 U/L (ref 38–126)
ALT SERPL-CCNC: 43 U/L (ref 9–52)
ANION GAP SERPL CALC-SCNC: 18 MMOL/L (ref 5–19)
APPEARANCE UR: (no result)
APTT PPP: YELLOW S
AST SERPL-CCNC: 113 U/L (ref 14–36)
BARBITURATES UR QL SCN: NEGATIVE
BASOPHILS # BLD AUTO: 0 10^3/UL (ref 0–0.2)
BASOPHILS NFR BLD AUTO: 0.5 % (ref 0–2)
BILIRUB DIRECT SERPL-MCNC: 0.3 MG/DL (ref 0–0.4)
BILIRUB SERPL-MCNC: 1.2 MG/DL (ref 0.2–1.3)
BILIRUB UR QL STRIP: NEGATIVE
BUN SERPL-MCNC: 9 MG/DL (ref 7–20)
CALCIUM: 9.6 MG/DL (ref 8.4–10.2)
CHLORIDE SERPL-SCNC: 91 MMOL/L (ref 98–107)
CO2 SERPL-SCNC: 23 MMOL/L (ref 22–30)
EOSINOPHIL # BLD AUTO: 0 10^3/UL (ref 0–0.6)
EOSINOPHIL NFR BLD AUTO: 0.2 % (ref 0–6)
ERYTHROCYTE [DISTWIDTH] IN BLOOD BY AUTOMATED COUNT: 15.9 % (ref 11.5–14)
ETHANOL SERPL-MCNC: < 10 MG/DL
GLUCOSE SERPL-MCNC: 169 MG/DL (ref 75–110)
GLUCOSE UR STRIP-MCNC: NEGATIVE MG/DL
HCT VFR BLD CALC: 40.1 % (ref 36–47)
HGB BLD-MCNC: 13.6 G/DL (ref 12–15.5)
KETONES UR STRIP-MCNC: 20 MG/DL
LYMPHOCYTES # BLD AUTO: 1.1 10^3/UL (ref 0.5–4.7)
LYMPHOCYTES NFR BLD AUTO: 14.5 % (ref 13–45)
MCH RBC QN AUTO: 31 PG (ref 27–33.4)
MCHC RBC AUTO-ENTMCNC: 33.9 G/DL (ref 32–36)
MCV RBC AUTO: 91 FL (ref 80–97)
METHADONE UR QL SCN: NEGATIVE
MONOCYTES # BLD AUTO: 0.7 10^3/UL (ref 0.1–1.4)
MONOCYTES NFR BLD AUTO: 9.2 % (ref 3–13)
NEUTROPHILS # BLD AUTO: 5.7 10^3/UL (ref 1.7–8.2)
NEUTS SEG NFR BLD AUTO: 75.6 % (ref 42–78)
NITRITE UR QL STRIP: NEGATIVE
PCP UR QL SCN: NEGATIVE
PH UR STRIP: 6 [PH] (ref 5–9)
PLATELET # BLD: 162 10^3/UL (ref 150–450)
POTASSIUM SERPL-SCNC: 3.5 MMOL/L (ref 3.6–5)
PROT SERPL-MCNC: 8.2 G/DL (ref 6.3–8.2)
PROT UR STRIP-MCNC: 30 MG/DL
RBC # BLD AUTO: 4.39 10^6/UL (ref 3.72–5.28)
SODIUM SERPL-SCNC: 131.9 MMOL/L (ref 137–145)
SP GR UR STRIP: 1.01
TOTAL CELLS COUNTED % (AUTO): 100 %
URINE AMPHETAMINES SCREEN: NEGATIVE
URINE BENZODIAZEPINES SCREEN: (no result)
URINE COCAINE SCREEN: NEGATIVE
URINE MARIJUANA (THC) SCREEN: NEGATIVE
UROBILINOGEN UR-MCNC: NEGATIVE MG/DL (ref ?–2)
WBC # BLD AUTO: 7.5 10^3/UL (ref 4–10.5)

## 2019-05-15 PROCEDURE — 80307 DRUG TEST PRSMV CHEM ANLYZR: CPT

## 2019-05-15 PROCEDURE — 81001 URINALYSIS AUTO W/SCOPE: CPT

## 2019-05-15 PROCEDURE — 99284 EMERGENCY DEPT VISIT MOD MDM: CPT

## 2019-05-15 PROCEDURE — 96365 THER/PROPH/DIAG IV INF INIT: CPT

## 2019-05-15 PROCEDURE — 93010 ELECTROCARDIOGRAM REPORT: CPT

## 2019-05-15 PROCEDURE — 80053 COMPREHEN METABOLIC PANEL: CPT

## 2019-05-15 PROCEDURE — 96367 TX/PROPH/DG ADDL SEQ IV INF: CPT

## 2019-05-15 PROCEDURE — 84703 CHORIONIC GONADOTROPIN ASSAY: CPT

## 2019-05-15 PROCEDURE — 87086 URINE CULTURE/COLONY COUNT: CPT

## 2019-05-15 PROCEDURE — 80164 ASSAY DIPROPYLACETIC ACD TOT: CPT

## 2019-05-15 PROCEDURE — 85025 COMPLETE CBC W/AUTO DIFF WBC: CPT

## 2019-05-15 PROCEDURE — 36415 COLL VENOUS BLD VENIPUNCTURE: CPT

## 2019-05-15 PROCEDURE — 87186 SC STD MICRODIL/AGAR DIL: CPT

## 2019-05-15 PROCEDURE — 87088 URINE BACTERIA CULTURE: CPT

## 2019-05-15 PROCEDURE — 83735 ASSAY OF MAGNESIUM: CPT

## 2019-05-15 PROCEDURE — 93005 ELECTROCARDIOGRAM TRACING: CPT

## 2019-05-15 NOTE — ER DOCUMENT REPORT
ED General





- General


Chief Complaint: Seizure


Stated Complaint: POSSIBLE SEIZURE


Time Seen by Provider: 05/15/19 18:09


Primary Care Provider: 


GER CHAUDHARI [Primary Care Provider] - Follow up as needed


Mode of Arrival: Medic


Information source: Patient


Notes: 


This is a 58-year-old female with a history of chronic back pain, seizures, 

alcohol abuse who was brought in by EMS after a seizure at home.  Patient does 

not remember the event.  She is pretty sure she had a seizure at home.  EMS 

stated that her neighbor heard her screaming and found her in the bathroom on 

the floor.  Patient was reported as being confused after the event.  She is 

alert and oriented x3 now.  She denies any fever, chills any recent illnesses.  

She denies any headache, neck pain, abdominal pain.








- HPI


Onset: Just prior to arrival


Onset/Duration: Sudden


Quality of pain: No pain


Severity: None


Pain Level: Denies


Associated symptoms: denies: Chest pain, Fever, Shortness of breath


Exacerbated by: Denies


Relieved by: Denies


Similar symptoms previously: Yes


Recently seen / treated by doctor: No





- Related Data


Allergies/Adverse Reactions: 


                                        





No Known Allergies Allergy (Unverified 05/15/19 18:35)


   











Past Medical History





- General


Information source: Patient, Emergency Med Personnel





- Social History


Smoking Status: Current Some Day Smoker


Cigarette use (# per day): Yes - 1 pack/day


Chew tobacco use (# tins/day): No


Smoking Education Provided: No


Frequency of alcohol use: Heavy


Drug Abuse: None


Lives with: Alone


Family History: None


Patient has suicidal ideation: No


Patient has homicidal ideation: No





- Past Medical History


Cardiac Medical History: Reports: Hx Hypertension


Pulmonary Medical History: Reports: None


EENT Medical History: Reports: None


Neurological Medical History: Reports: Hx Seizures


Endocrine Medical History: Reports: None


Renal/ Medical History: Reports: None.  Denies: Hx Peritoneal Dialysis


Malignancy Medical History: Reports: None


GI Medical History: Reports: None


Musculoskeletal Medical History: Reports None


Skin Medical History: Reports None


Psychiatric Medical History: Reports: None


Traumatic Medical History: Reports: None


Infectious Medical History: Reports: None


Past Surgical History: Reports: Hx Orthopedic Surgery





Review of Systems





- Review of Systems


Constitutional: denies: Chills, Fever


EENT: No symptoms reported


Cardiovascular: No symptoms reported


Respiratory: No symptoms reported


Gastrointestinal: No symptoms reported


Genitourinary: No symptoms reported


Female Genitourinary: No symptoms reported


Musculoskeletal: No symptoms reported


Skin: No symptoms reported


Hematologic/Lymphatic: No symptoms reported


Neurological/Psychological: See HPI





Physical Exam





- Vital signs


Vitals: 


                                        











Temp Resp BP Pulse Ox


 


 98.3 F   18   140/80 H  96 


 


 05/15/19 17:05  05/15/19 17:05  05/15/19 17:05  05/15/19 17:05











Notes: 





Physical exam:


 


GENERAL: She is alert and oriented x3, no acute distress.  Blood pressure is 

180/85.  Patient's pulse is 95, respiratory rate is 18.  She appears 

comfortable.





HEAD: Atraumatic, normocephalic.





EYES: Pupils equal round and reactive to light, extraocular movements intact, 

sclera anicteric, conjunctiva are normal.





ENT: TMs normal, nares patent, oropharynx clear without exudates.  Moist mucous 

membranes.





NECK: Normal range of motion, supple without obvious mass or JVD.





LUNGS: Breath sounds clear to auscultation bilaterally and equal.  No wheezes 

rales or rhonchi.





HEART: Regular rate and rhythm without murmurs, rubs or gallops.





ABDOMEN: Soft, normoactive bowel sounds.  No tenderness to palpation.  No 

guarding, no rebound.  No masses appreciated.





EXTREMITIES: Normal range of motion, no pitting or edema.  No clubbing or 

cyanosis.





NEUROLOGICAL: Cranial nerves II through XII grossly intact.  Normal speech, 

moving all extremities.





PSYCH: Normal mood, normal affect.





SKIN: Warm, Dry, normal turgor, no rashes or lesions noted.





Course





- Re-evaluation


Re-evalutation: 





05/15/19 23:59


Patient observed several hours in the emergency room.  There is been no further 

seizure activity.  She does have an asymptomatic UTI and was given some 

ceftriaxone.  We will send her home with Keflex.  Her Depakote level is somewhat

subtherapeutic and I will have her double the dose for the next few days.  I 

have advised her to follow-up with her primary care doctor.





- Vital Signs


Vital signs: 


                                        











Temp Pulse Resp BP Pulse Ox


 


 97.9 F      24 H  145/78 H  96 


 


 05/16/19 00:25     05/16/19 00:01  05/16/19 00:01  05/16/19 00:01














- Laboratory


Result Diagrams: 


                                 05/15/19 16:20





                                 05/15/19 16:20


Laboratory results interpreted by me: 


                                        











  05/15/19 05/15/19 05/15/19





  16:20 16:20 16:20


 


RDW  15.9 H  


 


Sodium   131.9 L 


 


Potassium   3.5 L 


 


Chloride   91 L 


 


Glucose   169 H 


 


AST   113 H 


 


Alkaline Phosphatase   159 H 


 


Urine Protein   


 


Urine Ketones   


 


Urine Blood   


 


Ur Leukocyte Esterase   


 


Valproic Acid    12.0 L














  05/15/19





  18:25


 


RDW 


 


Sodium 


 


Potassium 


 


Chloride 


 


Glucose 


 


AST 


 


Alkaline Phosphatase 


 


Urine Protein  30 H


 


Urine Ketones  20 H


 


Urine Blood  SMALL H


 


Ur Leukocyte Esterase  LARGE H


 


Valproic Acid 














- EKG Interpretation by Me


Rate: Normal


Rhythm: NSR - EKG shows normal sinus rhythm with a ventricular rate of 95, no 

acute ST-T wave changes.  QTc 488





Discharge





- Discharge


Clinical Impression: 


 Seizure, UTI





Condition: Stable


Disposition: HOME, SELF-CARE


Instructions:  Seizure, Known Epileptic ([facility])


Additional Instructions: 


As we discussed, want you to double the Depakote dose over the next 2 days.


Continue all other medicines.


Take the Keflex for UTI as prescribed.


I want you to follow-up with your primary care doctor this week.


Return to the emergency room for any concerns that your seizures are getting 

worse, not tolerating medicines or any other concerns you may have.


Prescriptions: 


Cephalexin Monohydrate [Keflex 500 mg Capsule] 500 mg PO QID #20 capsule


Referrals: 


FARA,NO [Primary Care Provider] - Follow up as needed

## 2019-05-16 VITALS — DIASTOLIC BLOOD PRESSURE: 78 MMHG | SYSTOLIC BLOOD PRESSURE: 145 MMHG
